# Patient Record
Sex: FEMALE | Race: WHITE | NOT HISPANIC OR LATINO | Employment: OTHER
[De-identification: names, ages, dates, MRNs, and addresses within clinical notes are randomized per-mention and may not be internally consistent; named-entity substitution may affect disease eponyms.]

---

## 2017-01-03 ENCOUNTER — CHARTING TRANS (OUTPATIENT)
Dept: OTHER | Age: 62
End: 2017-01-03

## 2017-01-09 ENCOUNTER — MYAURORA ACCOUNT LINK (OUTPATIENT)
Dept: OTHER | Age: 62
End: 2017-01-09

## 2017-01-09 ENCOUNTER — CHARTING TRANS (OUTPATIENT)
Dept: OTHER | Age: 62
End: 2017-01-09

## 2017-01-10 ENCOUNTER — CHARTING TRANS (OUTPATIENT)
Dept: OTHER | Age: 62
End: 2017-01-10

## 2017-01-11 ENCOUNTER — CHARTING TRANS (OUTPATIENT)
Dept: OTHER | Age: 62
End: 2017-01-11

## 2017-01-24 ENCOUNTER — CHARTING TRANS (OUTPATIENT)
Dept: OTHER | Age: 62
End: 2017-01-24

## 2017-02-01 ENCOUNTER — CHARTING TRANS (OUTPATIENT)
Dept: OTHER | Age: 62
End: 2017-02-01

## 2017-02-02 ENCOUNTER — CHARTING TRANS (OUTPATIENT)
Dept: URGENT CARE | Age: 62
End: 2017-02-02

## 2017-02-02 ENCOUNTER — MYAURORA ACCOUNT LINK (OUTPATIENT)
Dept: OTHER | Age: 62
End: 2017-02-02

## 2017-02-02 ENCOUNTER — LAB SERVICES (OUTPATIENT)
Dept: OTHER | Age: 62
End: 2017-02-02

## 2017-02-02 LAB
ALBUMIN SERPL BCG-MCNC: 4 G/DL (ref 3.6–5.1)
ALP SERPL-CCNC: 158 U/L (ref 45–105)
ALT SERPL W/O P-5'-P-CCNC: 67 U/L (ref 15–43)
AST SERPL-CCNC: 68 U/L (ref 14–43)
BASOPHIL %: 0.7 % (ref 0–1.2)
BASOPHIL ABSOLUTE #: 0.1 10*3/UL (ref 0–0.1)
BILIRUB SERPL-MCNC: 1.1 MG/DL (ref 0–1.3)
BUN SERPL-MCNC: 22 MG/DL (ref 7–20)
CALCIUM SERPL-MCNC: 9.6 MG/DL (ref 8.6–10.6)
CHLORIDE SERPL-SCNC: 113 MMOL/L (ref 96–107)
CREATININE, SERUM: 1.5 MG/DL (ref 0.5–1.4)
DIFFERENTIAL TYPE: ABNORMAL
EOSINOPHIL %: 1.5 % (ref 0–10)
EOSINOPHIL ABSOLUTE #: 0.1 10*3/UL (ref 0–0.5)
GFR SERPL CREATININE-BSD FRML MDRD: 35 ML/MIN/{1.73M2}
GFR SERPL CREATININE-BSD FRML MDRD: 42 ML/MIN/{1.73M2}
GLUCOSE SERPL-MCNC: 90 MG/DL (ref 70–200)
HCO3 SERPL-SCNC: 19 MMOL/L (ref 22–32)
HEMATOCRIT: 39.1 % (ref 34–45)
HEMOGLOBIN: 12.6 G/DL (ref 11.2–15.7)
LYMPH PERCENT: 14.5 % (ref 20.5–51.1)
LYMPHOCYTE ABSOLUTE #: 1.4 10*3/UL (ref 1.2–3.4)
MEAN CORPUSCULAR HGB CONCENTRATION: 32.2 % (ref 32–36)
MEAN CORPUSCULAR HGB: 32 PG (ref 27–34)
MEAN CORPUSCULAR VOLUME: 99.2 FL (ref 79–95)
MEAN PLATELET VOLUME: 9.7 FL (ref 8.6–12.4)
MONOCYTE ABSOLUTE #: 1.1 10*3/UL (ref 0.2–0.9)
MONOCYTE PERCENT: 11.2 % (ref 4.3–12.9)
NEUTROPHIL ABSOLUTE #: 6.8 10*3/UL (ref 1.4–6.5)
NEUTROPHIL PERCENT: 72.1 % (ref 34–73.5)
PLATELET COUNT: 348 10*3/UL (ref 150–400)
POTASSIUM SERPL-SCNC: 4.8 MMOL/L (ref 3.5–5.3)
PROT SERPL-MCNC: 7.9 G/DL (ref 6.4–8.5)
RED BLOOD CELL COUNT: 3.94 10*6/UL (ref 3.7–5.2)
RED CELL DISTRIBUTION WIDTH: 15.4 % (ref 11.3–14.8)
SODIUM SERPL-SCNC: 141 MMOL/L (ref 136–146)
WHITE BLOOD CELL COUNT: 9.4 10*3/UL (ref 4–10)

## 2017-02-02 ASSESSMENT — PAIN SCALES - GENERAL: PAINLEVEL_OUTOF10: 4

## 2017-02-10 ENCOUNTER — CHARTING TRANS (OUTPATIENT)
Dept: INTERNAL MEDICINE | Age: 62
End: 2017-02-10

## 2017-02-10 ENCOUNTER — MYAURORA ACCOUNT LINK (OUTPATIENT)
Dept: OTHER | Age: 62
End: 2017-02-10

## 2017-02-10 ENCOUNTER — LAB SERVICES (OUTPATIENT)
Dept: OTHER | Age: 62
End: 2017-02-10

## 2017-02-10 LAB
ALBUMIN SERPL BCG-MCNC: 4.1 G/DL (ref 3.6–5.1)
ALP SERPL-CCNC: 146 U/L (ref 45–105)
ALT SERPL W/O P-5'-P-CCNC: 31 U/L (ref 15–43)
AST SERPL-CCNC: 16 U/L (ref 14–43)
BILIRUB SERPL-MCNC: 0.4 MG/DL (ref 0–1.3)
BUN SERPL-MCNC: 17 MG/DL (ref 7–20)
CALCIUM SERPL-MCNC: 10 MG/DL (ref 8.6–10.6)
CHLORIDE SERPL-SCNC: 113 MMOL/L (ref 96–107)
CREATININE, SERUM: 1 MG/DL (ref 0.5–1.4)
GFR SERPL CREATININE-BSD FRML MDRD: 56 ML/MIN/{1.73M2}
GFR SERPL CREATININE-BSD FRML MDRD: >60 ML/MIN/{1.73M2}
GLUCOSE SERPL-MCNC: 82 MG/DL (ref 70–200)
HCO3 SERPL-SCNC: 22 MMOL/L (ref 22–32)
POTASSIUM SERPL-SCNC: 4.4 MMOL/L (ref 3.5–5.3)
PROT SERPL-MCNC: 6.9 G/DL (ref 6.4–8.5)
SODIUM SERPL-SCNC: 142 MMOL/L (ref 136–146)

## 2017-02-15 ENCOUNTER — CHARTING TRANS (OUTPATIENT)
Dept: OTHER | Age: 62
End: 2017-02-15

## 2017-02-17 ENCOUNTER — CHARTING TRANS (OUTPATIENT)
Dept: OTHER | Age: 62
End: 2017-02-17

## 2017-02-20 ENCOUNTER — CHARTING TRANS (OUTPATIENT)
Dept: OTHER | Age: 62
End: 2017-02-20

## 2017-09-03 ENCOUNTER — CHARTING TRANS (OUTPATIENT)
Dept: OTHER | Age: 62
End: 2017-09-03

## 2017-09-03 ENCOUNTER — LAB SERVICES (OUTPATIENT)
Dept: OTHER | Age: 62
End: 2017-09-03

## 2017-09-03 ENCOUNTER — EXTERNAL RECORD (OUTPATIENT)
Dept: HEALTH INFORMATION MANAGEMENT | Facility: OTHER | Age: 62
End: 2017-09-03

## 2017-09-03 LAB
BASOPHIL AUTOMATED: 0.7 %
BASOPHILS: 0.1 (ref 0–0.2)
EOSINOPHIL AUTOMATED: 2.4 %
EOSINOPHILS: 0.3 (ref 0–0.5)
HEMATOCRIT: 36.6 % (ref 34.7–45.1)
HEMOGLOBIN: 11.5 GM/DL (ref 12–15.3)
LYMPHOCYTE AUTOMATED: 14.9 %
LYMPHOCYTES: 1.7 (ref 0.6–3.4)
MEAN CORPUSCULAR HGB: 30.3 PG (ref 26–34)
MEAN CORPUSCULAR HGB: 31.4 G/DL (ref 32.5–35.8)
MEAN CORPUSCULAR VOL: 96.4 FL (ref 80–100)
MEAN PLATELET VOLUME: 8.1 FL (ref 6.8–10.2)
MONOCYTE AUTOMATED: 11.2 %
MONOCYTES: 1.3 (ref 0.3–1)
NEUTROPHIL ABSOLUTE: 8.3 (ref 1.7–7.7)
NEUTROPHIL AUTOMATED: 70.8 %
PLATELET COUNT: 326 K/MM3 (ref 150–450)
RED BLOOD CELL COUNT: 3.8 M/MM3 (ref 3.63–5.04)
RED CELL DISTRIBUTIO: 15.3 % (ref 11.9–15.9)
TROPONIN I (TROP): < 0.03 NG/ML
WHITE BLOOD CELL COU: 11.7 K/MM3 (ref 4–11)

## 2017-09-05 ENCOUNTER — CHARTING TRANS (OUTPATIENT)
Dept: OTHER | Age: 62
End: 2017-09-05

## 2017-09-08 ENCOUNTER — CHARTING TRANS (OUTPATIENT)
Dept: OTHER | Age: 62
End: 2017-09-08

## 2017-09-08 ENCOUNTER — IMAGING SERVICES (OUTPATIENT)
Dept: OTHER | Age: 62
End: 2017-09-08

## 2017-09-09 ENCOUNTER — CHARTING TRANS (OUTPATIENT)
Dept: OTHER | Age: 62
End: 2017-09-09

## 2017-09-12 ENCOUNTER — CHARTING TRANS (OUTPATIENT)
Dept: OTHER | Age: 62
End: 2017-09-12

## 2017-09-14 ENCOUNTER — CHARTING TRANS (OUTPATIENT)
Dept: OTHER | Age: 62
End: 2017-09-14

## 2017-09-18 ENCOUNTER — CHARTING TRANS (OUTPATIENT)
Dept: OTHER | Age: 62
End: 2017-09-18

## 2017-10-06 ENCOUNTER — IMAGING SERVICES (OUTPATIENT)
Dept: OTHER | Age: 62
End: 2017-10-06

## 2017-10-06 ENCOUNTER — CHARTING TRANS (OUTPATIENT)
Dept: CARDIOLOGY | Age: 62
End: 2017-10-06

## 2017-10-06 ENCOUNTER — CHARTING TRANS (OUTPATIENT)
Dept: OTHER | Age: 62
End: 2017-10-06

## 2017-10-09 ENCOUNTER — CHARTING TRANS (OUTPATIENT)
Dept: OTHER | Age: 62
End: 2017-10-09

## 2017-10-16 ENCOUNTER — CHARTING TRANS (OUTPATIENT)
Dept: OTHER | Age: 62
End: 2017-10-16

## 2017-10-19 ENCOUNTER — IMAGING SERVICES (OUTPATIENT)
Dept: OTHER | Age: 62
End: 2017-10-19

## 2017-10-20 ENCOUNTER — CHARTING TRANS (OUTPATIENT)
Dept: OTHER | Age: 62
End: 2017-10-20

## 2017-11-14 ENCOUNTER — LAB SERVICES (OUTPATIENT)
Dept: OTHER | Age: 62
End: 2017-11-14

## 2017-11-14 LAB
ALBUMIN SERPL BCG-MCNC: 4 G/DL (ref 3.6–5.1)
ALP SERPL-CCNC: 120 U/L (ref 45–105)
ALT SERPL W/O P-5'-P-CCNC: 46 U/L (ref 15–43)
AST SERPL-CCNC: 34 U/L (ref 14–43)
BILIRUB SERPL-MCNC: 0.2 MG/DL (ref 0–1.3)
BUN SERPL-MCNC: 25 MG/DL (ref 7–20)
CALCIUM SERPL-MCNC: 9.5 MG/DL (ref 8.6–10.6)
CHLORIDE SERPL-SCNC: 116 MMOL/L (ref 96–107)
CHOLEST SERPL-MCNC: 187 MG/DL (ref 140–200)
CREATININE, SERUM: 1.3 MG/DL (ref 0.5–1.4)
GFR SERPL CREATININE-BSD FRML MDRD: 41 ML/MIN/{1.73M2}
GFR SERPL CREATININE-BSD FRML MDRD: 50 ML/MIN/{1.73M2}
GLUCOSE P FAST SERPL-MCNC: 89 MG/DL (ref 60–100)
HCO3 SERPL-SCNC: 18 MMOL/L (ref 22–32)
HDLC SERPL-MCNC: 41 MG/DL
LDLC SERPL CALC-MCNC: 103 MG/DL (ref 30–100)
POTASSIUM SERPL-SCNC: 4.9 MMOL/L (ref 3.5–5.3)
PROT SERPL-MCNC: 6.7 G/DL (ref 6.4–8.5)
SODIUM SERPL-SCNC: 146 MMOL/L (ref 136–146)
TRIGL SERPL-MCNC: 214 MG/DL (ref 0–200)

## 2017-11-21 ENCOUNTER — CHARTING TRANS (OUTPATIENT)
Dept: OTHER | Age: 62
End: 2017-11-21

## 2017-11-27 ENCOUNTER — CHARTING TRANS (OUTPATIENT)
Dept: OTHER | Age: 62
End: 2017-11-27

## 2017-12-12 ENCOUNTER — MYAURORA ACCOUNT LINK (OUTPATIENT)
Dept: OTHER | Age: 62
End: 2017-12-12

## 2017-12-12 ENCOUNTER — CHARTING TRANS (OUTPATIENT)
Dept: CARDIOLOGY | Age: 62
End: 2017-12-12

## 2017-12-12 ENCOUNTER — IMAGING SERVICES (OUTPATIENT)
Dept: OTHER | Age: 62
End: 2017-12-12

## 2017-12-12 ENCOUNTER — CHARTING TRANS (OUTPATIENT)
Dept: OTHER | Age: 62
End: 2017-12-12

## 2017-12-28 ENCOUNTER — LAB SERVICES (OUTPATIENT)
Dept: OTHER | Age: 62
End: 2017-12-28

## 2017-12-28 LAB
BASOPHIL %: 0.4 % (ref 0–1.2)
BASOPHIL ABSOLUTE #: 0.1 10*3/UL (ref 0–0.1)
BUN SERPL-MCNC: 19 MG/DL (ref 7–20)
CALCIUM SERPL-MCNC: 9.7 MG/DL (ref 8.6–10.6)
CHLORIDE SERPL-SCNC: 113 MMOL/L (ref 96–107)
CREATININE, SERUM: 1.1 MG/DL (ref 0.5–1.4)
DIFFERENTIAL TYPE: ABNORMAL
EOSINOPHIL %: 3.6 % (ref 0–10)
EOSINOPHIL ABSOLUTE #: 0.4 10*3/UL (ref 0–0.5)
GFR SERPL CREATININE-BSD FRML MDRD: 50 ML/MIN/{1.73M2}
GFR SERPL CREATININE-BSD FRML MDRD: >60 ML/MIN/{1.73M2}
GLUCOSE SERPL-MCNC: 101 MG/DL (ref 70–200)
HCO3 SERPL-SCNC: 19 MMOL/L (ref 22–32)
HEMATOCRIT: 35.9 % (ref 34–45)
HEMOGLOBIN: 11 G/DL (ref 11.2–15.7)
INTERNATIONAL NORMALIZED RATIO: 1
LYMPH PERCENT: 27.7 % (ref 20.5–51.1)
LYMPHOCYTE ABSOLUTE #: 3.1 10*3/UL (ref 1.2–3.4)
MAGNESIUM SERPL-MCNC: 1.8 MG/DL (ref 1.6–2.6)
MEAN CORPUSCULAR HGB CONCENTRATION: 30.6 % (ref 32–36)
MEAN CORPUSCULAR HGB: 31 PG (ref 27–34)
MEAN CORPUSCULAR VOLUME: 101.1 FL (ref 79–95)
MEAN PLATELET VOLUME: 9.8 FL (ref 8.6–12.4)
MONOCYTE ABSOLUTE #: 0.9 10*3/UL (ref 0.2–0.9)
MONOCYTE PERCENT: 7.6 % (ref 4.3–12.9)
NEUTROPHIL ABSOLUTE #: 6.8 10*3/UL (ref 1.4–6.5)
NEUTROPHIL PERCENT: 60.7 % (ref 34–73.5)
PLATELET COUNT: 544 10*3/UL (ref 150–400)
POTASSIUM SERPL-SCNC: 4.6 MMOL/L (ref 3.5–5.3)
PROTHROMBIN TIME: 9.9 S (ref 9.5–11.5)
RED BLOOD CELL COUNT: 3.55 10*6/UL (ref 3.7–5.2)
RED CELL DISTRIBUTION WIDTH: 14.5 % (ref 11.3–14.8)
SODIUM SERPL-SCNC: 144 MMOL/L (ref 136–146)
WHITE BLOOD CELL COUNT: 11.2 10*3/UL (ref 4–10)

## 2018-02-07 ENCOUNTER — CHARTING TRANS (OUTPATIENT)
Dept: OTHER | Age: 63
End: 2018-02-07

## 2018-02-08 ENCOUNTER — MYAURORA ACCOUNT LINK (OUTPATIENT)
Dept: OTHER | Age: 63
End: 2018-02-08

## 2018-02-08 ENCOUNTER — CHARTING TRANS (OUTPATIENT)
Dept: OTHER | Age: 63
End: 2018-02-08

## 2018-02-21 ENCOUNTER — IMAGING SERVICES (OUTPATIENT)
Dept: OTHER | Age: 63
End: 2018-02-21

## 2018-03-07 ENCOUNTER — LAB SERVICES (OUTPATIENT)
Dept: OTHER | Age: 63
End: 2018-03-07

## 2018-03-07 ENCOUNTER — CHARTING TRANS (OUTPATIENT)
Dept: OTHER | Age: 63
End: 2018-03-07

## 2018-03-07 LAB
ALBUMIN SERPL BCG-MCNC: 4.3 G/DL (ref 3.6–5.1)
ALP SERPL-CCNC: 131 U/L (ref 45–105)
ALT SERPL W/O P-5'-P-CCNC: 35 U/L (ref 15–43)
AST SERPL-CCNC: 19 U/L (ref 14–43)
BILIRUB SERPL-MCNC: 0.3 MG/DL (ref 0–1.3)
BILIRUBIN URINE: NEGATIVE
BLOOD URINE: ABNORMAL
BUN SERPL-MCNC: 24 MG/DL (ref 7–20)
CALCIUM SERPL-MCNC: 9.8 MG/DL (ref 8.6–10.6)
CHLORIDE SERPL-SCNC: 111 MMOL/L (ref 96–107)
CLARITY: ABNORMAL
COLOR: YELLOW
CREATININE, SERUM: 1.4 MG/DL (ref 0.5–1.4)
DIFFERENTIAL TYPE: ABNORMAL
GFR SERPL CREATININE-BSD FRML MDRD: 38 ML/MIN/{1.73M2}
GFR SERPL CREATININE-BSD FRML MDRD: 46 ML/MIN/{1.73M2}
GLUCOSE QUALITATIVE U: NEGATIVE
GLUCOSE SERPL-MCNC: 120 MG/DL (ref 70–200)
HCO3 SERPL-SCNC: 19 MMOL/L (ref 22–32)
HEMATOCRIT: 35.5 % (ref 34–45)
HEMOGLOBIN: 11.4 G/DL (ref 11.2–15.7)
INFLUENZA TYPE A ANTIGEN: NEGATIVE
INFLUENZA TYPE B ANTIGEN: NEGATIVE
KETONES, URINE: 20
LEUKOCYTE ESTERASE URINE: ABNORMAL
MEAN CORPUSCULAR HGB CONCENTRATION: 32.1 % (ref 32–36)
MEAN CORPUSCULAR HGB: 32 PG (ref 27–34)
MEAN CORPUSCULAR VOLUME: 99.7 FL (ref 79–95)
MEAN PLATELET VOLUME: 9.5 FL (ref 8.6–12.4)
NITRITE URINE: POSITIVE
PH URINE: 5 (ref 5–7)
PLATELET COUNT: 357 10*3/UL (ref 150–400)
POTASSIUM SERPL-SCNC: 4.5 MMOL/L (ref 3.5–5.3)
PROT SERPL-MCNC: 7.5 G/DL (ref 6.4–8.5)
RED BLOOD CELL COUNT: 3.56 10*6/UL (ref 3.7–5.2)
RED CELL DISTRIBUTION WIDTH: 14.9 % (ref 11.3–14.8)
SODIUM SERPL-SCNC: 143 MMOL/L (ref 136–146)
SPECIFIC GRAVITY URINE: 1.02 (ref 1–1.03)
URINE PROTEIN, QUAL (DIPSTICK): 100
UROBILINOGEN URINE: <2
WHITE BLOOD CELL COUNT: 22.2 10*3/UL (ref 4–10)

## 2018-03-07 ASSESSMENT — PAIN SCALES - GENERAL: PAINLEVEL_OUTOF10: 9

## 2018-03-08 ENCOUNTER — CHARTING TRANS (OUTPATIENT)
Dept: OTHER | Age: 63
End: 2018-03-08

## 2018-03-09 ENCOUNTER — LAB SERVICES (OUTPATIENT)
Dept: OTHER | Age: 63
End: 2018-03-09

## 2018-03-09 ENCOUNTER — CHARTING TRANS (OUTPATIENT)
Dept: OTHER | Age: 63
End: 2018-03-09

## 2018-03-09 ENCOUNTER — MYAURORA ACCOUNT LINK (OUTPATIENT)
Dept: OTHER | Age: 63
End: 2018-03-09

## 2018-03-09 LAB
ALBUMIN SERPL BCG-MCNC: 3.8 G/DL (ref 3.6–5.1)
ALP SERPL-CCNC: 120 U/L (ref 45–105)
ALT SERPL W/O P-5'-P-CCNC: 14 U/L (ref 7–34)
AST SERPL-CCNC: 13 U/L (ref 9–37)
BILIRUB SERPL-MCNC: 0.3 MG/DL (ref 0–1)
BILIRUBIN URINE: ABNORMAL
BLOOD URINE: ABNORMAL
BUN SERPL-MCNC: 29 MG/DL (ref 7–20)
CALCIUM SERPL-MCNC: 9.6 MG/DL (ref 8.6–10.6)
CHLORIDE SERPL-SCNC: 108 MMOL/L (ref 96–107)
CLARITY: ABNORMAL
COLOR: YELLOW
CREATININE, SERUM: 1.5 MG/DL (ref 0.5–1.4)
DIFFERENTIAL TYPE: ABNORMAL
GFR SERPL CREATININE-BSD FRML MDRD: 35 ML/MIN/{1.73M2}
GFR SERPL CREATININE-BSD FRML MDRD: 42 ML/MIN/{1.73M2}
GLUCOSE QUALITATIVE U: NEGATIVE
GLUCOSE SERPL-MCNC: 94 MG/DL (ref 70–200)
HCO3 SERPL-SCNC: 21 MMOL/L (ref 22–32)
HEMATOCRIT: 36.4 % (ref 34–45)
HEMOGLOBIN: 11.9 G/DL (ref 11.2–15.7)
KETONES, URINE: NEGATIVE
LEUKOCYTE ESTERASE URINE: ABNORMAL
LYMPH PERCENT: 14.8 % (ref 20.5–51.1)
LYMPHOCYTE ABSOLUTE #: 1.6 10*3/UL (ref 1.2–3.4)
MEAN CORPUSCULAR HGB CONCENTRATION: 32.7 % (ref 32–36)
MEAN CORPUSCULAR HGB: 32 PG (ref 27–34)
MEAN CORPUSCULAR VOLUME: 97.8 FL (ref 79–95)
MEAN PLATELET VOLUME: 8.8 FL (ref 8.6–12.4)
MIXED %: 14.2 % (ref 4.3–12.9)
MIXED ABSOLUTE #: 1.6 10*3/UL (ref 0.2–0.9)
NEUTROPHIL ABSOLUTE #: 7.9 10*3/UL (ref 1.4–6.5)
NEUTROPHIL PERCENT: 71 % (ref 34–73.5)
NITRITE URINE: NEGATIVE
PH URINE: 5 (ref 5–7)
PLATELET COUNT: 423 10*3/UL (ref 150–400)
POTASSIUM SERPL-SCNC: 3.9 MMOL/L (ref 3.5–5.3)
PROT SERPL-MCNC: 7.3 G/DL (ref 6.2–8.1)
RED BLOOD CELL COUNT: 3.72 10*6/UL (ref 3.7–5.2)
RED CELL DISTRIBUTION WIDTH: 14.4 % (ref 11.3–14.8)
SODIUM SERPL-SCNC: 138 MMOL/L (ref 136–146)
SPECIFIC GRAVITY URINE: 1.02 (ref 1–1.03)
URINE PROTEIN, QUAL (DIPSTICK): ABNORMAL
UROBILINOGEN URINE: 0.2
WHITE BLOOD CELL COUNT: 11.1 10*3/UL (ref 4–10)

## 2018-03-09 ASSESSMENT — PAIN SCALES - GENERAL: PAINLEVEL_OUTOF10: 9

## 2018-03-12 ENCOUNTER — CHARTING TRANS (OUTPATIENT)
Dept: OTHER | Age: 63
End: 2018-03-12

## 2018-04-16 ENCOUNTER — CHARTING TRANS (OUTPATIENT)
Dept: OTHER | Age: 63
End: 2018-04-16

## 2018-04-19 ENCOUNTER — CHARTING TRANS (OUTPATIENT)
Dept: OTHER | Age: 63
End: 2018-04-19

## 2018-05-01 ENCOUNTER — CHARTING TRANS (OUTPATIENT)
Dept: OTHER | Age: 63
End: 2018-05-01

## 2018-05-21 ENCOUNTER — CHARTING TRANS (OUTPATIENT)
Dept: OTHER | Age: 63
End: 2018-05-21

## 2018-06-01 ENCOUNTER — LAB SERVICES (OUTPATIENT)
Dept: OTHER | Age: 63
End: 2018-06-01

## 2018-06-01 LAB
ALBUMIN SERPL-MCNC: 4 G/DL (ref 3.6–5.1)
ALP SERPL-CCNC: 123 U/L (ref 45–105)
ALT SERPL-CCNC: 49 U/L (ref 15–43)
AST SERPL-CCNC: 34 U/L (ref 14–43)
BILIRUB CONJ SERPL-MCNC: 0 MG/DL (ref 0–0.3)
BILIRUB SERPL-MCNC: <0.1 MG/DL (ref 0–1.3)
CHOLEST SERPL-MCNC: 166 MG/DL (ref 140–200)
HDLC SERPL-MCNC: 40 MG/DL
LDLC SERPL CALC-MCNC: 89 MG/DL (ref 30–100)
PROT SERPL-MCNC: 6.4 G/DL (ref 6.4–8.5)
TRIGL SERPL-MCNC: 187 MG/DL (ref 0–200)

## 2018-06-05 ENCOUNTER — MYAURORA ACCOUNT LINK (OUTPATIENT)
Dept: OTHER | Age: 63
End: 2018-06-05

## 2018-06-05 ENCOUNTER — CHARTING TRANS (OUTPATIENT)
Dept: OTHER | Age: 63
End: 2018-06-05

## 2018-06-26 ENCOUNTER — CHARTING TRANS (OUTPATIENT)
Dept: OTHER | Age: 63
End: 2018-06-26

## 2018-07-02 ENCOUNTER — CHARTING TRANS (OUTPATIENT)
Dept: OTHER | Age: 63
End: 2018-07-02

## 2018-08-03 ENCOUNTER — CHARTING TRANS (OUTPATIENT)
Dept: OTHER | Age: 63
End: 2018-08-03

## 2018-09-12 ENCOUNTER — CHARTING TRANS (OUTPATIENT)
Dept: OTHER | Age: 63
End: 2018-09-12

## 2018-09-18 ENCOUNTER — CHARTING TRANS (OUTPATIENT)
Dept: OTHER | Age: 63
End: 2018-09-18

## 2018-09-21 ENCOUNTER — CHARTING TRANS (OUTPATIENT)
Dept: OTHER | Age: 63
End: 2018-09-21

## 2018-09-25 ENCOUNTER — MYAURORA ACCOUNT LINK (OUTPATIENT)
Dept: OTHER | Age: 63
End: 2018-09-25

## 2018-09-25 ENCOUNTER — ANCILLARY ORDERS (OUTPATIENT)
Dept: OTHER | Age: 63
End: 2018-09-25

## 2018-09-25 ENCOUNTER — CHARTING TRANS (OUTPATIENT)
Dept: OTHER | Age: 63
End: 2018-09-25

## 2018-09-25 DIAGNOSIS — Z12.31 ENCOUNTER FOR SCREENING MAMMOGRAM FOR MALIGNANT NEOPLASM OF BREAST: ICD-10-CM

## 2018-09-27 ENCOUNTER — CHARTING TRANS (OUTPATIENT)
Dept: OTHER | Age: 63
End: 2018-09-27

## 2018-10-12 ENCOUNTER — CHARTING TRANS (OUTPATIENT)
Dept: OTHER | Age: 63
End: 2018-10-12

## 2018-10-15 ENCOUNTER — ANCILLARY ORDERS (OUTPATIENT)
Dept: OTHER | Age: 63
End: 2018-10-15

## 2018-10-15 ENCOUNTER — CHARTING TRANS (OUTPATIENT)
Dept: OTHER | Age: 63
End: 2018-10-15

## 2018-10-15 ENCOUNTER — MYAURORA ACCOUNT LINK (OUTPATIENT)
Dept: OTHER | Age: 63
End: 2018-10-15

## 2018-10-15 DIAGNOSIS — Q21.10 ATRIAL SEPTAL DEFECT: ICD-10-CM

## 2018-10-15 DIAGNOSIS — I71.40 ABDOMINAL AORTIC ANEURYSM WITHOUT RUPTURE (CMD): ICD-10-CM

## 2018-10-15 DIAGNOSIS — N25.89 OTHER DISORDERS RESULTING FROM IMPAIRED RENAL TUBULAR FUNCTION: ICD-10-CM

## 2018-10-15 DIAGNOSIS — N18.30 CHRONIC KIDNEY DISEASE, STAGE III (MODERATE) (CMD): ICD-10-CM

## 2018-10-17 ENCOUNTER — CHARTING TRANS (OUTPATIENT)
Dept: OTHER | Age: 63
End: 2018-10-17

## 2018-10-23 PROBLEM — M16.32 OSTEOARTHRITIS RESULTING FROM LEFT HIP DYSPLASIA: Status: ACTIVE | Noted: 2018-10-23

## 2018-10-24 ENCOUNTER — CHARTING TRANS (OUTPATIENT)
Dept: OTHER | Age: 63
End: 2018-10-24

## 2018-10-31 PROBLEM — M25.552 LEFT HIP PAIN: Status: ACTIVE | Noted: 2018-10-31

## 2018-11-23 ENCOUNTER — IMAGING SERVICES (OUTPATIENT)
Dept: OTHER | Age: 63
End: 2018-11-23

## 2018-11-23 ENCOUNTER — CHARTING TRANS (OUTPATIENT)
Dept: OTHER | Age: 63
End: 2018-11-23

## 2018-11-27 VITALS
HEIGHT: 66 IN | SYSTOLIC BLOOD PRESSURE: 118 MMHG | BODY MASS INDEX: 23.78 KG/M2 | HEART RATE: 79 BPM | WEIGHT: 148 LBS | DIASTOLIC BLOOD PRESSURE: 80 MMHG | OXYGEN SATURATION: 98 %

## 2018-11-28 VITALS — HEART RATE: 72 BPM | WEIGHT: 142 LBS | DIASTOLIC BLOOD PRESSURE: 66 MMHG | SYSTOLIC BLOOD PRESSURE: 110 MMHG

## 2018-11-28 VITALS
RESPIRATION RATE: 14 BRPM | HEIGHT: 66 IN | WEIGHT: 145 LBS | SYSTOLIC BLOOD PRESSURE: 118 MMHG | OXYGEN SATURATION: 100 % | BODY MASS INDEX: 23.3 KG/M2 | HEART RATE: 73 BPM | DIASTOLIC BLOOD PRESSURE: 86 MMHG

## 2018-11-28 VITALS
BODY MASS INDEX: 23.63 KG/M2 | WEIGHT: 147 LBS | SYSTOLIC BLOOD PRESSURE: 132 MMHG | HEART RATE: 82 BPM | HEIGHT: 66 IN | OXYGEN SATURATION: 97 % | DIASTOLIC BLOOD PRESSURE: 80 MMHG

## 2018-11-29 VITALS
SYSTOLIC BLOOD PRESSURE: 128 MMHG | TEMPERATURE: 99.2 F | HEART RATE: 86 BPM | RESPIRATION RATE: 16 BRPM | DIASTOLIC BLOOD PRESSURE: 77 MMHG | OXYGEN SATURATION: 99 %

## 2018-11-29 VITALS
OXYGEN SATURATION: 98 % | HEART RATE: 80 BPM | SYSTOLIC BLOOD PRESSURE: 108 MMHG | WEIGHT: 149 LBS | DIASTOLIC BLOOD PRESSURE: 60 MMHG

## 2018-12-28 ENCOUNTER — OFFICE VISIT (OUTPATIENT)
Dept: INTERNAL MEDICINE | Age: 63
End: 2018-12-28

## 2018-12-28 ENCOUNTER — LAB SERVICES (OUTPATIENT)
Dept: LAB | Age: 63
End: 2018-12-28

## 2018-12-28 VITALS
DIASTOLIC BLOOD PRESSURE: 90 MMHG | BODY MASS INDEX: 25.66 KG/M2 | OXYGEN SATURATION: 98 % | HEART RATE: 88 BPM | HEIGHT: 65 IN | SYSTOLIC BLOOD PRESSURE: 134 MMHG | WEIGHT: 154 LBS

## 2018-12-28 DIAGNOSIS — J06.9 UPPER RESPIRATORY TRACT INFECTION, UNSPECIFIED TYPE: ICD-10-CM

## 2018-12-28 DIAGNOSIS — I10 ESSENTIAL HYPERTENSION: ICD-10-CM

## 2018-12-28 DIAGNOSIS — I26.99 PE (PULMONARY THROMBOEMBOLISM) (CMD): ICD-10-CM

## 2018-12-28 DIAGNOSIS — R30.0 DYSURIA: Primary | ICD-10-CM

## 2018-12-28 DIAGNOSIS — R30.0 DYSURIA: ICD-10-CM

## 2018-12-28 PROBLEM — N18.30 CKD (CHRONIC KIDNEY DISEASE) STAGE 3, GFR 30-59 ML/MIN (CMD): Status: ACTIVE | Noted: 2018-12-28

## 2018-12-28 PROBLEM — Z72.0 TOBACCO USE: Status: ACTIVE | Noted: 2017-09-15

## 2018-12-28 PROBLEM — G45.9 TIA (TRANSIENT ISCHEMIC ATTACK): Status: ACTIVE | Noted: 2017-10-06

## 2018-12-28 PROBLEM — M16.32 OSTEOARTHRITIS RESULTING FROM LEFT HIP DYSPLASIA: Status: ACTIVE | Noted: 2018-10-23

## 2018-12-28 PROBLEM — E78.5 HLD (HYPERLIPIDEMIA): Status: ACTIVE | Noted: 2018-12-28

## 2018-12-28 LAB
BACTERIA: ABNORMAL
BILIRUBIN URINE: NEGATIVE
BLOOD URINE: ABNORMAL
CLARITY: ABNORMAL
COLOR: YELLOW
GLUCOSE QUALITATIVE U: NEGATIVE
KETONES, URINE: NEGATIVE
LEUKOCYTE ESTERASE URINE: ABNORMAL
MUCOUS: ABNORMAL
NITRITE URINE: NEGATIVE
PH URINE: 5 (ref 5–7)
RED BLOOD CELLS URINE: ABNORMAL (ref 0–3)
SPECIFIC GRAVITY URINE: 1.03 (ref 1–1.03)
SQUAMOUS EPITHELIAL CELLS: ABNORMAL
URINE PROTEIN, QUAL (DIPSTICK): NEGATIVE
UROBILINOGEN URINE: <2
WBC CLUMPS: ABNORMAL
WHITE BLOOD CELLS URINE: >100 (ref 0–5)

## 2018-12-28 PROCEDURE — 81001 URINALYSIS AUTO W/SCOPE: CPT | Performed by: FAMILY MEDICINE

## 2018-12-28 PROCEDURE — 87186 SC STD MICRODIL/AGAR DIL: CPT | Performed by: FAMILY MEDICINE

## 2018-12-28 PROCEDURE — 99214 OFFICE O/P EST MOD 30 MIN: CPT | Performed by: FAMILY MEDICINE

## 2018-12-28 PROCEDURE — 87088 URINE BACTERIA CULTURE: CPT | Performed by: FAMILY MEDICINE

## 2018-12-28 PROCEDURE — 87086 URINE CULTURE/COLONY COUNT: CPT | Performed by: FAMILY MEDICINE

## 2018-12-28 RX ORDER — CEFUROXIME AXETIL 500 MG/1
500 TABLET ORAL 2 TIMES DAILY
Qty: 20 TABLET | Refills: 0 | Status: SHIPPED | OUTPATIENT
Start: 2018-12-28 | End: 2019-02-19 | Stop reason: ALTCHOICE

## 2018-12-28 RX ORDER — TOPIRAMATE 100 MG/1
1 TABLET, FILM COATED ORAL 2 TIMES DAILY
COMMUNITY
Start: 2004-11-08

## 2018-12-28 RX ORDER — YOHIMBE BARK 500 MG
CAPSULE ORAL
COMMUNITY
Start: 2014-08-15

## 2018-12-28 RX ORDER — BUTALBITAL, ACETAMINOPHEN AND CAFFEINE 50; 325; 40 MG/1; MG/1; MG/1
1 TABLET ORAL EVERY 4 HOURS PRN
COMMUNITY
Start: 2014-07-15 | End: 2023-06-13 | Stop reason: SDUPTHER

## 2018-12-28 RX ORDER — ASPIRIN 81 MG/1
81 TABLET, CHEWABLE ORAL DAILY
COMMUNITY

## 2018-12-28 RX ORDER — CETIRIZINE HYDROCHLORIDE 10 MG/1
10 TABLET ORAL DAILY
COMMUNITY

## 2018-12-28 RX ORDER — VENLAFAXINE HYDROCHLORIDE 75 MG/1
1 CAPSULE, EXTENDED RELEASE ORAL EVERY MORNING
COMMUNITY
Start: 2016-12-30

## 2018-12-28 RX ORDER — ATORVASTATIN CALCIUM 40 MG/1
1 TABLET, FILM COATED ORAL DAILY
COMMUNITY
Start: 2018-11-12 | End: 2019-01-03 | Stop reason: SDUPTHER

## 2018-12-31 ENCOUNTER — ANCILLARY PROCEDURE (OUTPATIENT)
Dept: CARDIOLOGY | Age: 63
End: 2018-12-31
Attending: INTERNAL MEDICINE

## 2018-12-31 DIAGNOSIS — G45.9 TIA (TRANSIENT ISCHEMIC ATTACK): ICD-10-CM

## 2018-12-31 LAB — FINAL REPORT: ABNORMAL

## 2019-01-01 ENCOUNTER — EXTERNAL RECORD (OUTPATIENT)
Dept: HEALTH INFORMATION MANAGEMENT | Facility: OTHER | Age: 64
End: 2019-01-01

## 2019-01-02 ENCOUNTER — TELEPHONE (OUTPATIENT)
Dept: INTERNAL MEDICINE | Age: 64
End: 2019-01-02

## 2019-01-02 DIAGNOSIS — R30.0 DYSURIA: Primary | ICD-10-CM

## 2019-01-02 DIAGNOSIS — R31.9 HEMATURIA, UNSPECIFIED TYPE: ICD-10-CM

## 2019-01-02 RX ORDER — CIPROFLOXACIN 250 MG/1
250 TABLET, FILM COATED ORAL 2 TIMES DAILY
Qty: 14 TABLET | Refills: 0 | Status: SHIPPED | OUTPATIENT
Start: 2019-01-02 | End: 2019-01-09

## 2019-01-03 ENCOUNTER — TELEPHONE (OUTPATIENT)
Dept: CARDIOLOGY | Age: 64
End: 2019-01-03

## 2019-01-03 RX ORDER — ATORVASTATIN CALCIUM 40 MG/1
40 TABLET, FILM COATED ORAL DAILY
Qty: 90 TABLET | Refills: 3 | Status: SHIPPED | OUTPATIENT
Start: 2019-01-03 | End: 2019-12-12 | Stop reason: SDUPTHER

## 2019-01-10 ENCOUNTER — LAB SERVICES (OUTPATIENT)
Dept: LAB | Age: 64
End: 2019-01-10

## 2019-01-10 DIAGNOSIS — R30.0 DYSURIA: ICD-10-CM

## 2019-01-10 LAB
BILIRUBIN URINE: NEGATIVE
BLOOD URINE: ABNORMAL
CLARITY: CLEAR
COLOR: YELLOW
GLUCOSE QUALITATIVE U: NEGATIVE
KETONES, URINE: NEGATIVE
LEUKOCYTE ESTERASE URINE: ABNORMAL
MUCOUS: ABNORMAL
NITRITE URINE: NEGATIVE
PH URINE: 5 (ref 5–7)
RED BLOOD CELLS URINE: ABNORMAL (ref 0–3)
SPECIFIC GRAVITY URINE: 1.02 (ref 1–1.03)
SQUAMOUS EPITHELIAL CELLS: ABNORMAL
URINE PROTEIN, QUAL (DIPSTICK): NEGATIVE
UROBILINOGEN URINE: <2
WHITE BLOOD CELLS URINE: ABNORMAL (ref 0–5)

## 2019-01-10 PROCEDURE — 81001 URINALYSIS AUTO W/SCOPE: CPT | Performed by: FAMILY MEDICINE

## 2019-01-10 PROCEDURE — 87086 URINE CULTURE/COLONY COUNT: CPT | Performed by: FAMILY MEDICINE

## 2019-01-11 ENCOUNTER — TELEPHONE (OUTPATIENT)
Dept: INTERNAL MEDICINE | Age: 64
End: 2019-01-11

## 2019-01-11 LAB — FINAL REPORT: NORMAL

## 2019-01-16 ENCOUNTER — TELEPHONE (OUTPATIENT)
Dept: INTERNAL MEDICINE | Age: 64
End: 2019-01-16

## 2019-01-16 DIAGNOSIS — R31.9 HEMATURIA, UNSPECIFIED TYPE: Primary | ICD-10-CM

## 2019-01-17 ENCOUNTER — IMAGING SERVICES (OUTPATIENT)
Dept: CT IMAGING | Age: 64
End: 2019-01-17

## 2019-01-21 ENCOUNTER — ANCILLARY PROCEDURE (OUTPATIENT)
Dept: CARDIOLOGY | Age: 64
End: 2019-01-21
Attending: INTERNAL MEDICINE

## 2019-01-21 DIAGNOSIS — G45.9 TIA (TRANSIENT ISCHEMIC ATTACK): ICD-10-CM

## 2019-01-21 DIAGNOSIS — G45.9 TIA (TRANSIENT ISCHEMIC ATTACK): Primary | ICD-10-CM

## 2019-01-21 PROCEDURE — 93291 INTERROG DEV EVAL SCRMS IP: CPT | Performed by: INTERNAL MEDICINE

## 2019-01-23 ENCOUNTER — IMAGING SERVICES (OUTPATIENT)
Dept: CT IMAGING | Age: 64
End: 2019-01-23

## 2019-01-23 DIAGNOSIS — R31.9 HEMATURIA, UNSPECIFIED TYPE: ICD-10-CM

## 2019-01-23 PROCEDURE — 74176 CT ABD & PELVIS W/O CONTRAST: CPT | Performed by: RADIOLOGY

## 2019-01-24 ENCOUNTER — IMAGING SERVICES (OUTPATIENT)
Dept: OTHER | Age: 64
End: 2019-01-24

## 2019-01-24 ENCOUNTER — TELEPHONE (OUTPATIENT)
Dept: CARDIOLOGY | Age: 64
End: 2019-01-24

## 2019-01-24 ENCOUNTER — TELEPHONE (OUTPATIENT)
Dept: INTERNAL MEDICINE | Age: 64
End: 2019-01-24

## 2019-01-24 DIAGNOSIS — I71.40 ABDOMINAL AORTIC ANEURYSM (AAA) WITHOUT RUPTURE (CMD): Primary | ICD-10-CM

## 2019-02-08 ENCOUNTER — ANCILLARY PROCEDURE (OUTPATIENT)
Dept: CARDIOLOGY | Age: 64
End: 2019-02-08
Attending: INTERNAL MEDICINE

## 2019-02-08 DIAGNOSIS — G45.9 TIA (TRANSIENT ISCHEMIC ATTACK): ICD-10-CM

## 2019-02-19 ENCOUNTER — OFFICE VISIT (OUTPATIENT)
Dept: CARDIOLOGY | Age: 64
End: 2019-02-19

## 2019-02-19 ENCOUNTER — TELEPHONE (OUTPATIENT)
Dept: CARDIOLOGY | Age: 64
End: 2019-02-19

## 2019-02-19 VITALS
HEIGHT: 65 IN | HEART RATE: 90 BPM | OXYGEN SATURATION: 99 % | SYSTOLIC BLOOD PRESSURE: 124 MMHG | RESPIRATION RATE: 18 BRPM | BODY MASS INDEX: 26.06 KG/M2 | WEIGHT: 156.4 LBS | DIASTOLIC BLOOD PRESSURE: 80 MMHG

## 2019-02-19 DIAGNOSIS — R06.09 DYSPNEA ON EXERTION: ICD-10-CM

## 2019-02-19 DIAGNOSIS — Z01.810 PREOP CARDIOVASCULAR EXAM: ICD-10-CM

## 2019-02-19 DIAGNOSIS — I25.119 CORONARY ARTERY DISEASE INVOLVING NATIVE CORONARY ARTERY OF NATIVE HEART WITH ANGINA PECTORIS (CMD): ICD-10-CM

## 2019-02-19 DIAGNOSIS — I10 ESSENTIAL HYPERTENSION: Primary | ICD-10-CM

## 2019-02-19 DIAGNOSIS — I71.40 ABDOMINAL AORTIC ANEURYSM (AAA) WITHOUT RUPTURE (CMD): ICD-10-CM

## 2019-02-19 DIAGNOSIS — Z01.810 PREOP CARDIOVASCULAR EXAM: Primary | ICD-10-CM

## 2019-02-19 PROBLEM — Q21.10 ASD (ATRIAL SEPTAL DEFECT): Status: ACTIVE | Noted: 2017-09-01

## 2019-02-19 PROCEDURE — 3074F SYST BP LT 130 MM HG: CPT | Performed by: INTERNAL MEDICINE

## 2019-02-19 PROCEDURE — 93000 ELECTROCARDIOGRAM COMPLETE: CPT | Performed by: INTERNAL MEDICINE

## 2019-02-19 PROCEDURE — 99214 OFFICE O/P EST MOD 30 MIN: CPT | Performed by: INTERNAL MEDICINE

## 2019-02-19 PROCEDURE — 3079F DIAST BP 80-89 MM HG: CPT | Performed by: INTERNAL MEDICINE

## 2019-02-19 ASSESSMENT — ENCOUNTER SYMPTOMS
CONSTITUTIONAL NEGATIVE: 1
ENDOCRINE NEGATIVE: 1
EYES NEGATIVE: 1
PSYCHIATRIC NEGATIVE: 1
SHORTNESS OF BREATH: 1
GASTROINTESTINAL NEGATIVE: 1
ALLERGIC/IMMUNOLOGIC NEGATIVE: 1
HEMATOLOGIC/LYMPHATIC NEGATIVE: 1

## 2019-02-21 ENCOUNTER — TELEPHONE (OUTPATIENT)
Dept: CARDIOLOGY | Age: 64
End: 2019-02-21

## 2019-02-22 ENCOUNTER — ANCILLARY PROCEDURE (OUTPATIENT)
Dept: CARDIOLOGY | Age: 64
End: 2019-02-22
Attending: INTERNAL MEDICINE

## 2019-02-22 DIAGNOSIS — G45.9 TIA (TRANSIENT ISCHEMIC ATTACK): ICD-10-CM

## 2019-02-22 PROCEDURE — 93299 CARDIAC DEVICE CHECK - REMOTE SCHEDULED: CPT | Performed by: INTERNAL MEDICINE

## 2019-02-22 PROCEDURE — 93298 REM INTERROG DEV EVAL SCRMS: CPT | Performed by: INTERNAL MEDICINE

## 2019-02-25 ENCOUNTER — LAB SERVICES (OUTPATIENT)
Dept: LAB | Age: 64
End: 2019-02-25

## 2019-02-25 ENCOUNTER — IMAGING SERVICES (OUTPATIENT)
Dept: GENERAL RADIOLOGY | Age: 64
End: 2019-02-25
Attending: INTERNAL MEDICINE

## 2019-02-25 ENCOUNTER — ANCILLARY PROCEDURE (OUTPATIENT)
Dept: ULTRASOUND IMAGING | Age: 64
End: 2019-02-25
Attending: INTERNAL MEDICINE

## 2019-02-25 DIAGNOSIS — I10 ESSENTIAL HYPERTENSION: ICD-10-CM

## 2019-02-25 DIAGNOSIS — Z01.810 PREOP CARDIOVASCULAR EXAM: ICD-10-CM

## 2019-02-25 DIAGNOSIS — R06.09 DYSPNEA ON EXERTION: ICD-10-CM

## 2019-02-25 DIAGNOSIS — I71.40 ABDOMINAL AORTIC ANEURYSM (AAA) WITHOUT RUPTURE (CMD): ICD-10-CM

## 2019-02-25 DIAGNOSIS — I25.119 CORONARY ARTERY DISEASE INVOLVING NATIVE CORONARY ARTERY OF NATIVE HEART WITH ANGINA PECTORIS (CMD): ICD-10-CM

## 2019-02-25 DIAGNOSIS — G45.9 TRANSIENT CEREBRAL ISCHEMIC ATTACK: ICD-10-CM

## 2019-02-25 LAB
BASOPHIL %: 0.6 % (ref 0–1.2)
BASOPHIL ABSOLUTE #: 0.1 10*3/UL (ref 0–0.1)
DIFFERENTIAL TYPE: ABNORMAL
EOSINOPHIL %: 2.5 % (ref 0–10)
EOSINOPHIL ABSOLUTE #: 0.3 10*3/UL (ref 0–0.5)
HEMATOCRIT: 40 % (ref 34–45)
HEMOGLOBIN: 12.4 G/DL (ref 11.2–15.7)
INTERNATIONAL NORMALIZED RATIO: 1
LYMPH PERCENT: 31.8 % (ref 20.5–51.1)
LYMPHOCYTE ABSOLUTE #: 3.2 10*3/UL (ref 1.2–3.4)
MAGNESIUM SERPL-MCNC: 1.7 MG/DL (ref 1.6–2.6)
MEAN CORPUSCULAR HGB CONCENTRATION: 31 % (ref 32–36)
MEAN CORPUSCULAR HGB: 30.9 PG (ref 27–34)
MEAN CORPUSCULAR VOLUME: 99.8 FL (ref 79–95)
MEAN PLATELET VOLUME: 10.3 FL (ref 8.6–12.4)
MONOCYTE ABSOLUTE #: 1 10*3/UL (ref 0.2–0.9)
MONOCYTE PERCENT: 9.4 % (ref 4.3–12.9)
NEUTROPHIL ABSOLUTE #: 5.7 10*3/UL (ref 1.4–6.5)
NEUTROPHIL PERCENT: 55.7 % (ref 34–73.5)
PLATELET COUNT: 367 10*3/UL (ref 150–400)
PROTHROMBIN TIME, THERAPEUTIC: 10.1 S (ref 9.5–11.5)
PTT: 25.4 S (ref 24–38)
RED BLOOD CELL COUNT: 4.01 10*6/UL (ref 3.7–5.2)
RED CELL DISTRIBUTION WIDTH: 14.9 % (ref 11.3–14.8)
WHITE BLOOD CELL COUNT: 10.1 10*3/UL (ref 4–10)

## 2019-02-25 PROCEDURE — 83735 ASSAY OF MAGNESIUM: CPT | Performed by: INTERNAL MEDICINE

## 2019-02-25 PROCEDURE — 85025 COMPLETE CBC W/AUTO DIFF WBC: CPT | Performed by: INTERNAL MEDICINE

## 2019-02-25 PROCEDURE — 36415 COLL VENOUS BLD VENIPUNCTURE: CPT | Performed by: INTERNAL MEDICINE

## 2019-02-25 PROCEDURE — 85610 PROTHROMBIN TIME: CPT | Performed by: INTERNAL MEDICINE

## 2019-02-25 PROCEDURE — 71046 X-RAY EXAM CHEST 2 VIEWS: CPT | Performed by: RADIOLOGY

## 2019-02-25 PROCEDURE — 85730 THROMBOPLASTIN TIME PARTIAL: CPT | Performed by: INTERNAL MEDICINE

## 2019-02-26 ENCOUNTER — TELEPHONE (OUTPATIENT)
Dept: SCHEDULING | Age: 64
End: 2019-02-26

## 2019-02-26 ENCOUNTER — TELEPHONE (OUTPATIENT)
Dept: INTERNAL MEDICINE | Age: 64
End: 2019-02-26

## 2019-02-26 DIAGNOSIS — R30.0 DYSURIA: Primary | ICD-10-CM

## 2019-02-27 ENCOUNTER — LAB SERVICES (OUTPATIENT)
Dept: LAB | Age: 64
End: 2019-02-27

## 2019-02-27 DIAGNOSIS — R30.0 DYSURIA: ICD-10-CM

## 2019-02-27 LAB
BACTERIA: ABNORMAL
BILIRUBIN URINE: NEGATIVE
BLOOD URINE: ABNORMAL
CLARITY: ABNORMAL
COLOR: YELLOW
GLUCOSE QUALITATIVE U: NEGATIVE
KETONES, URINE: NEGATIVE
LEUKOCYTE ESTERASE URINE: ABNORMAL
MUCOUS: ABNORMAL
NITRITE URINE: NEGATIVE
PH URINE: 5 (ref 5–7)
RED BLOOD CELLS URINE: ABNORMAL (ref 0–3)
SPECIFIC GRAVITY URINE: 1.02 (ref 1–1.03)
SQUAMOUS EPITHELIAL CELLS: ABNORMAL
URINE PROTEIN, QUAL (DIPSTICK): NEGATIVE
UROBILINOGEN URINE: <2
WBC CLUMPS: ABNORMAL
WHITE BLOOD CELLS URINE: >100 (ref 0–5)

## 2019-02-27 PROCEDURE — 87186 SC STD MICRODIL/AGAR DIL: CPT | Performed by: FAMILY MEDICINE

## 2019-02-27 PROCEDURE — 87088 URINE BACTERIA CULTURE: CPT | Performed by: FAMILY MEDICINE

## 2019-02-27 PROCEDURE — 87086 URINE CULTURE/COLONY COUNT: CPT | Performed by: FAMILY MEDICINE

## 2019-02-27 PROCEDURE — 81001 URINALYSIS AUTO W/SCOPE: CPT | Performed by: FAMILY MEDICINE

## 2019-02-27 RX ORDER — CIPROFLOXACIN 250 MG/1
250 TABLET, FILM COATED ORAL 2 TIMES DAILY
Qty: 10 TABLET | Refills: 0 | Status: SHIPPED | OUTPATIENT
Start: 2019-02-27 | End: 2019-03-04

## 2019-03-01 ENCOUNTER — OFFICE VISIT (OUTPATIENT)
Dept: NEPHROLOGY | Age: 64
End: 2019-03-01

## 2019-03-01 VITALS
WEIGHT: 148 LBS | DIASTOLIC BLOOD PRESSURE: 84 MMHG | HEIGHT: 65 IN | BODY MASS INDEX: 24.66 KG/M2 | SYSTOLIC BLOOD PRESSURE: 126 MMHG | HEART RATE: 88 BPM

## 2019-03-01 DIAGNOSIS — I71.40 ABDOMINAL AORTIC ANEURYSM WITHOUT RUPTURE (CMD): ICD-10-CM

## 2019-03-01 DIAGNOSIS — N18.30 CHRONIC KIDNEY DISEASE, STAGE III (MODERATE) (CMD): Primary | ICD-10-CM

## 2019-03-01 DIAGNOSIS — N18.30 CKD (CHRONIC KIDNEY DISEASE) STAGE 3, GFR 30-59 ML/MIN (CMD): ICD-10-CM

## 2019-03-01 DIAGNOSIS — N20.0 KIDNEY STONE: ICD-10-CM

## 2019-03-01 DIAGNOSIS — I12.9 NEPHROSCLEROSIS ARTERIOLAR, STAGE 1-4 OR UNSPECIFIED CHRONIC KIDNEY DISEASE: ICD-10-CM

## 2019-03-01 LAB — FINAL REPORT: ABNORMAL

## 2019-03-01 PROCEDURE — 3074F SYST BP LT 130 MM HG: CPT | Performed by: INTERNAL MEDICINE

## 2019-03-01 PROCEDURE — 99203 OFFICE O/P NEW LOW 30 MIN: CPT | Performed by: INTERNAL MEDICINE

## 2019-03-01 PROCEDURE — 3079F DIAST BP 80-89 MM HG: CPT | Performed by: INTERNAL MEDICINE

## 2019-03-01 SDOH — HEALTH STABILITY: MENTAL HEALTH: HOW OFTEN DO YOU HAVE A DRINK CONTAINING ALCOHOL?: NEVER

## 2019-03-02 ENCOUNTER — LAB SERVICES (OUTPATIENT)
Dept: LAB | Age: 64
End: 2019-03-02

## 2019-03-02 DIAGNOSIS — N18.30 CHRONIC KIDNEY DISEASE, STAGE III (MODERATE) (CMD): ICD-10-CM

## 2019-03-02 DIAGNOSIS — I71.40 ABDOMINAL AORTIC ANEURYSM WITHOUT RUPTURE (CMD): ICD-10-CM

## 2019-03-02 DIAGNOSIS — N20.0 KIDNEY STONE: ICD-10-CM

## 2019-03-02 DIAGNOSIS — I12.9 NEPHROSCLEROSIS ARTERIOLAR, STAGE 1-4 OR UNSPECIFIED CHRONIC KIDNEY DISEASE: ICD-10-CM

## 2019-03-02 DIAGNOSIS — N18.30 CKD (CHRONIC KIDNEY DISEASE) STAGE 3, GFR 30-59 ML/MIN (CMD): ICD-10-CM

## 2019-03-02 LAB
BACTERIA: ABNORMAL
BILIRUBIN URINE: NEGATIVE
BLOOD URINE: ABNORMAL
BUN SERPL-MCNC: 19 MG/DL (ref 7–20)
CALCIUM SERPL-MCNC: 10 MG/DL (ref 8.6–10.6)
CALCIUM SERPL-MCNC: 10 MG/DL (ref 8.6–10.6)
CHLORIDE SERPL-SCNC: 113 MMOL/L (ref 96–107)
CLARITY: ABNORMAL
CO2 SERPL-SCNC: 23 MMOL/L (ref 22–32)
COLOR: YELLOW
CREAT SERPL-MCNC: 1.2 MG/DL (ref 0.5–1.4)
GFR SERPL CREATININE-BSD FRML MDRD: 45 ML/MIN/{1.73M2}
GFR SERPL CREATININE-BSD FRML MDRD: 55 ML/MIN/{1.73M2}
GLUCOSE QUALITATIVE U: NEGATIVE
GLUCOSE SERPL-MCNC: 83 MG/DL (ref 70–200)
HYALINE CAST: ABNORMAL
KETONES, URINE: NEGATIVE
LEUKOCYTE ESTERASE URINE: ABNORMAL
MUCOUS: ABNORMAL
NITRITE URINE: NEGATIVE
NON SQUAMOUS EPITHELIAL CELLS: ABNORMAL
PH URINE: 5 (ref 5–7)
PHOSPHATE SERPL-MCNC: 4 MG/DL (ref 2.5–4.9)
POTASSIUM SERPL-SCNC: 4.4 MMOL/L (ref 3.5–5.3)
PTH-INTACT SERPL-MCNC: 57.3 PG/ML (ref 23–73)
RED BLOOD CELLS URINE: ABNORMAL (ref 0–3)
SODIUM SERPL-SCNC: 140 MMOL/L (ref 136–146)
SPECIFIC GRAVITY URINE: 1.03 (ref 1–1.03)
SQUAMOUS EPITHELIAL CELLS: ABNORMAL
URINE PROTEIN, QUAL (DIPSTICK): NEGATIVE
UROBILINOGEN URINE: <2
WBC CLUMPS: ABNORMAL
WHITE BLOOD CELLS URINE: >100 (ref 0–5)

## 2019-03-02 PROCEDURE — 83970 ASSAY OF PARATHORMONE: CPT | Performed by: INTERNAL MEDICINE

## 2019-03-02 PROCEDURE — 84100 ASSAY OF PHOSPHORUS: CPT | Performed by: INTERNAL MEDICINE

## 2019-03-02 PROCEDURE — 80048 BASIC METABOLIC PNL TOTAL CA: CPT | Performed by: INTERNAL MEDICINE

## 2019-03-02 PROCEDURE — 81001 URINALYSIS AUTO W/SCOPE: CPT | Performed by: INTERNAL MEDICINE

## 2019-03-02 PROCEDURE — 87086 URINE CULTURE/COLONY COUNT: CPT | Performed by: INTERNAL MEDICINE

## 2019-03-02 PROCEDURE — 36415 COLL VENOUS BLD VENIPUNCTURE: CPT | Performed by: INTERNAL MEDICINE

## 2019-03-04 ENCOUNTER — TELEPHONE (OUTPATIENT)
Dept: ENDOCRINOLOGY | Age: 64
End: 2019-03-04

## 2019-03-04 LAB — FINAL REPORT: NORMAL

## 2019-03-05 VITALS
HEART RATE: 102 BPM | OXYGEN SATURATION: 98 % | BODY MASS INDEX: 23.3 KG/M2 | DIASTOLIC BLOOD PRESSURE: 78 MMHG | HEIGHT: 66 IN | WEIGHT: 145 LBS | SYSTOLIC BLOOD PRESSURE: 122 MMHG

## 2019-03-06 VITALS
RESPIRATION RATE: 18 BRPM | OXYGEN SATURATION: 98 % | SYSTOLIC BLOOD PRESSURE: 104 MMHG | TEMPERATURE: 98.7 F | HEART RATE: 90 BPM | DIASTOLIC BLOOD PRESSURE: 74 MMHG

## 2019-03-06 VITALS
DIASTOLIC BLOOD PRESSURE: 84 MMHG | HEART RATE: 87 BPM | WEIGHT: 146 LBS | BODY MASS INDEX: 23.46 KG/M2 | RESPIRATION RATE: 16 BRPM | HEIGHT: 66 IN | OXYGEN SATURATION: 99 % | SYSTOLIC BLOOD PRESSURE: 124 MMHG

## 2019-03-06 VITALS
DIASTOLIC BLOOD PRESSURE: 56 MMHG | RESPIRATION RATE: 18 BRPM | HEART RATE: 82 BPM | TEMPERATURE: 98.1 F | SYSTOLIC BLOOD PRESSURE: 118 MMHG | OXYGEN SATURATION: 99 %

## 2019-03-06 VITALS
WEIGHT: 145 LBS | BODY MASS INDEX: 23.4 KG/M2 | DIASTOLIC BLOOD PRESSURE: 50 MMHG | OXYGEN SATURATION: 98 % | SYSTOLIC BLOOD PRESSURE: 110 MMHG | HEART RATE: 88 BPM

## 2019-03-11 ENCOUNTER — IMAGING SERVICES (OUTPATIENT)
Dept: ULTRASOUND IMAGING | Age: 64
End: 2019-03-11
Attending: INTERNAL MEDICINE

## 2019-03-11 DIAGNOSIS — N20.0 KIDNEY STONE: ICD-10-CM

## 2019-03-11 DIAGNOSIS — I12.9 NEPHROSCLEROSIS ARTERIOLAR, STAGE 1-4 OR UNSPECIFIED CHRONIC KIDNEY DISEASE: ICD-10-CM

## 2019-03-11 DIAGNOSIS — N18.30 CHRONIC KIDNEY DISEASE, STAGE III (MODERATE) (CMD): ICD-10-CM

## 2019-03-11 DIAGNOSIS — N18.30 CKD (CHRONIC KIDNEY DISEASE) STAGE 3, GFR 30-59 ML/MIN (CMD): ICD-10-CM

## 2019-03-11 DIAGNOSIS — I71.40 ABDOMINAL AORTIC ANEURYSM WITHOUT RUPTURE (CMD): ICD-10-CM

## 2019-03-11 PROCEDURE — 76775 US EXAM ABDO BACK WALL LIM: CPT | Performed by: RADIOLOGY

## 2019-03-13 ENCOUNTER — TELEPHONE (OUTPATIENT)
Dept: NEPHROLOGY | Age: 64
End: 2019-03-13

## 2019-03-14 ENCOUNTER — TELEPHONE (OUTPATIENT)
Dept: CARDIOLOGY | Age: 64
End: 2019-03-14

## 2019-03-16 ENCOUNTER — LAB SERVICES (OUTPATIENT)
Dept: LAB | Age: 64
End: 2019-03-16

## 2019-03-20 ENCOUNTER — EXTERNAL RECORD (OUTPATIENT)
Dept: CARDIOLOGY | Age: 64
End: 2019-03-20

## 2019-03-20 ENCOUNTER — TELEPHONE (OUTPATIENT)
Dept: CARDIOLOGY | Age: 64
End: 2019-03-20

## 2019-03-20 DIAGNOSIS — I71.40 ABDOMINAL AORTIC ANEURYSM WITHOUT RUPTURE (CMD): Primary | ICD-10-CM

## 2019-03-20 DIAGNOSIS — I10 HYPERTENSION, UNSPECIFIED TYPE: ICD-10-CM

## 2019-03-20 DIAGNOSIS — Z72.0 TOBACCO USE: ICD-10-CM

## 2019-03-20 DIAGNOSIS — R09.89 OTHER SPECIFIED SYMPTOMS AND SIGNS INVOLVING THE CIRCULATORY AND RESPIRATORY SYSTEMS: ICD-10-CM

## 2019-03-20 PROBLEM — Z98.890 S/P CARDIAC CATH: Status: ACTIVE | Noted: 2019-03-20

## 2019-03-25 ENCOUNTER — LAB SERVICES (OUTPATIENT)
Dept: LAB | Age: 64
End: 2019-03-25

## 2019-03-25 DIAGNOSIS — I71.40 ABDOMINAL AORTIC ANEURYSM WITHOUT RUPTURE (CMD): ICD-10-CM

## 2019-03-25 DIAGNOSIS — R09.89 OTHER SPECIFIED SYMPTOMS AND SIGNS INVOLVING THE CIRCULATORY AND RESPIRATORY SYSTEMS: ICD-10-CM

## 2019-03-25 DIAGNOSIS — Z72.0 TOBACCO USE: ICD-10-CM

## 2019-03-25 DIAGNOSIS — I10 HYPERTENSION, UNSPECIFIED TYPE: ICD-10-CM

## 2019-03-25 LAB
BUN SERPL-MCNC: 21 MG/DL (ref 7–20)
CALCIUM SERPL-MCNC: 10 MG/DL (ref 8.6–10.6)
CHLORIDE SERPL-SCNC: 111 MMOL/L (ref 96–107)
CO2 SERPL-SCNC: 19 MMOL/L (ref 22–32)
CREAT SERPL-MCNC: 1.2 MG/DL (ref 0.5–1.4)
GFR SERPL CREATININE-BSD FRML MDRD: 45 ML/MIN/{1.73M2}
GFR SERPL CREATININE-BSD FRML MDRD: 55 ML/MIN/{1.73M2}
GLUCOSE SERPL-MCNC: 86 MG/DL (ref 70–200)
POTASSIUM SERPL-SCNC: 4.4 MMOL/L (ref 3.5–5.3)
SODIUM SERPL-SCNC: 140 MMOL/L (ref 136–146)

## 2019-03-25 PROCEDURE — 80048 BASIC METABOLIC PNL TOTAL CA: CPT | Performed by: INTERNAL MEDICINE

## 2019-03-25 PROCEDURE — 36415 COLL VENOUS BLD VENIPUNCTURE: CPT | Performed by: INTERNAL MEDICINE

## 2019-03-26 ENCOUNTER — ANCILLARY PROCEDURE (OUTPATIENT)
Dept: CARDIOLOGY | Age: 64
End: 2019-03-26
Attending: INTERNAL MEDICINE

## 2019-03-26 PROCEDURE — 93299 CARDIAC DEVICE CHECK - REMOTE SCHEDULED: CPT | Performed by: INTERNAL MEDICINE

## 2019-03-26 PROCEDURE — 93298 REM INTERROG DEV EVAL SCRMS: CPT | Performed by: INTERNAL MEDICINE

## 2019-03-29 ENCOUNTER — APPOINTMENT (OUTPATIENT)
Dept: NEPHROLOGY | Age: 64
End: 2019-03-29

## 2019-04-03 ENCOUNTER — ANCILLARY PROCEDURE (OUTPATIENT)
Dept: CARDIOLOGY | Age: 64
End: 2019-04-03
Attending: INTERNAL MEDICINE

## 2019-04-03 DIAGNOSIS — I10 HYPERTENSION, UNSPECIFIED TYPE: ICD-10-CM

## 2019-04-03 DIAGNOSIS — I71.40 ABDOMINAL AORTIC ANEURYSM WITHOUT RUPTURE (CMD): ICD-10-CM

## 2019-04-03 DIAGNOSIS — Z72.0 TOBACCO USE: ICD-10-CM

## 2019-04-03 DIAGNOSIS — R09.89 OTHER SPECIFIED SYMPTOMS AND SIGNS INVOLVING THE CIRCULATORY AND RESPIRATORY SYSTEMS: ICD-10-CM

## 2019-04-03 PROCEDURE — 93925 LOWER EXTREMITY STUDY: CPT | Performed by: SURGERY

## 2019-04-09 ENCOUNTER — TELEPHONE (OUTPATIENT)
Dept: CARDIOLOGY | Age: 64
End: 2019-04-09

## 2019-04-10 ENCOUNTER — WALK IN (OUTPATIENT)
Dept: URGENT CARE | Age: 64
End: 2019-04-10

## 2019-04-10 ENCOUNTER — TELEPHONE (OUTPATIENT)
Dept: INTERNAL MEDICINE | Age: 64
End: 2019-04-10

## 2019-04-10 VITALS
SYSTOLIC BLOOD PRESSURE: 130 MMHG | OXYGEN SATURATION: 98 % | HEART RATE: 92 BPM | DIASTOLIC BLOOD PRESSURE: 80 MMHG | TEMPERATURE: 97.5 F | RESPIRATION RATE: 20 BRPM

## 2019-04-10 DIAGNOSIS — R35.0 FREQUENT URINATION: Primary | ICD-10-CM

## 2019-04-10 LAB
BACTERIA: ABNORMAL
BILIRUBIN URINE: ABNORMAL
BLOOD URINE: ABNORMAL
CLARITY: ABNORMAL
COLOR: YELLOW
GLUCOSE QUALITATIVE U: NEGATIVE
KETONES, URINE: ABNORMAL
LEUKOCYTE ESTERASE URINE: ABNORMAL
NITRITE URINE: NEGATIVE
PH URINE: 6 (ref 5–7)
RED BLOOD CELLS URINE: >100 (ref 0–3)
SPECIFIC GRAVITY URINE: 1.02 (ref 1–1.03)
SQUAMOUS EPITHELIAL CELLS: ABNORMAL
URINE PROTEIN, QUAL (DIPSTICK): ABNORMAL
UROBILINOGEN URINE: ABNORMAL
WHITE BLOOD CELLS URINE: ABNORMAL (ref 0–5)

## 2019-04-10 PROCEDURE — 87086 URINE CULTURE/COLONY COUNT: CPT | Performed by: FAMILY MEDICINE

## 2019-04-10 PROCEDURE — 87088 URINE BACTERIA CULTURE: CPT | Performed by: FAMILY MEDICINE

## 2019-04-10 PROCEDURE — 99214 OFFICE O/P EST MOD 30 MIN: CPT | Performed by: FAMILY MEDICINE

## 2019-04-10 PROCEDURE — 87186 SC STD MICRODIL/AGAR DIL: CPT | Performed by: FAMILY MEDICINE

## 2019-04-10 PROCEDURE — 3075F SYST BP GE 130 - 139MM HG: CPT | Performed by: FAMILY MEDICINE

## 2019-04-10 PROCEDURE — 3079F DIAST BP 80-89 MM HG: CPT | Performed by: FAMILY MEDICINE

## 2019-04-10 PROCEDURE — 81001 URINALYSIS AUTO W/SCOPE: CPT | Performed by: FAMILY MEDICINE

## 2019-04-10 RX ORDER — NITROFURANTOIN 25; 75 MG/1; MG/1
100 CAPSULE ORAL 2 TIMES DAILY
Qty: 14 CAPSULE | Refills: 0 | Status: SHIPPED | OUTPATIENT
Start: 2019-04-10 | End: 2019-04-17

## 2019-04-12 ENCOUNTER — WALK IN (OUTPATIENT)
Dept: URGENT CARE | Age: 64
End: 2019-04-12

## 2019-04-12 DIAGNOSIS — N39.0 URINARY TRACT INFECTION WITH HEMATURIA, SITE UNSPECIFIED: Primary | ICD-10-CM

## 2019-04-12 DIAGNOSIS — R30.0 DYSURIA: ICD-10-CM

## 2019-04-12 DIAGNOSIS — R31.9 URINARY TRACT INFECTION WITH HEMATURIA, SITE UNSPECIFIED: Primary | ICD-10-CM

## 2019-04-12 LAB
BILIRUBIN URINE: NEGATIVE
BLOOD URINE: ABNORMAL
CLARITY: ABNORMAL
COLOR: ABNORMAL
FINAL REPORT: ABNORMAL
GLUCOSE QUALITATIVE U: NEGATIVE
KETONES, URINE: NEGATIVE
LEUKOCYTE ESTERASE URINE: ABNORMAL
NITRITE URINE: NEGATIVE
PH URINE: 5 (ref 5–7)
RED BLOOD CELLS URINE: ABNORMAL (ref 0–3)
SPECIFIC GRAVITY URINE: 1.02 (ref 1–1.03)
SQUAMOUS EPITHELIAL CELLS: 0
URINE PROTEIN, QUAL (DIPSTICK): 100
UROBILINOGEN URINE: <2
WHITE BLOOD CELLS URINE: ABNORMAL (ref 0–5)

## 2019-04-12 PROCEDURE — 3074F SYST BP LT 130 MM HG: CPT | Performed by: FAMILY MEDICINE

## 2019-04-12 PROCEDURE — 87086 URINE CULTURE/COLONY COUNT: CPT | Performed by: FAMILY MEDICINE

## 2019-04-12 PROCEDURE — 3078F DIAST BP <80 MM HG: CPT | Performed by: FAMILY MEDICINE

## 2019-04-12 PROCEDURE — 81001 URINALYSIS AUTO W/SCOPE: CPT | Performed by: FAMILY MEDICINE

## 2019-04-12 PROCEDURE — 99214 OFFICE O/P EST MOD 30 MIN: CPT | Performed by: FAMILY MEDICINE

## 2019-04-12 SDOH — HEALTH STABILITY: MENTAL HEALTH: HOW OFTEN DO YOU HAVE A DRINK CONTAINING ALCOHOL?: NEVER

## 2019-04-12 ASSESSMENT — PAIN SCALES - GENERAL: PAINLEVEL: 0

## 2019-04-13 LAB
*MEAN CORPUSCULAR HGB CONC: 33.5 G/DL (ref 32.5–35.8)
A/G RATIO: 1.41 (ref 1.1–2.4)
ALANINE AMINOTRANSFE: 16 U/L (ref 7–52)
ALBUMIN, SERUM (ALB): 4.1 G/DL (ref 3.5–5.7)
ALKALINE PHOSPHATASE (ALK): 88 U/L (ref 34–104)
ANION GAP: 10 MEQ/L (ref 8–16)
APPEARANCE: CLEAR
ASPARTATE AMINOTRANS: 17 U/L (ref 13–39)
BACTERIA: ABNORMAL /HPF
BASOPHIL AUTOMATED: 0.9 %
BASOPHILS: 0.1 (ref 0–0.2)
BILIRUBIN, TOTAL: 0.3 MG/DL (ref 0.2–0.8)
BILIRUBIN: ABNORMAL
BLOOD UREA NITROGEN (BUN): 19 MG/DL (ref 7–25)
BUN/CREATININE RATIO: 14.4 (ref 7.4–23)
CALCIUM, SERUM: 9.4 MG/DL (ref 8.6–10.3)
CARBON DIOXIDE: 21 MMOL/L (ref 21–31)
CHLORIDE, SERUM: 110 MMOL/L (ref 98–107)
COLOR: YELLOW
CREATININE: 1.32 MG/DL (ref 0.6–1.2)
CULTURE REFLEX COMMENT: YES
EOSINOPHIL AUTOMATED: 2.2 %
EOSINOPHILS: 0.2 (ref 0–0.5)
EST GLOMERULAR FILTRATION RATE: 40 1.73M SQ
GLUCOSE, URINE, AUTOMATED: ABNORMAL MG/DL
GLUCOSE: 77 MG/DL (ref 70–99)
HEMATOCRIT: 35.8 % (ref 34.7–45.1)
HEMOGLOBIN: 12 GM/DL (ref 12–15.3)
K (POTASSIUM, SERUM): 4.3 MMOL/L (ref 3.5–5.1)
KETONES, URINE, AUTOMATED: ABNORMAL MG/DL
LEUKOCYTE, URINE, AUTOMATED: ABNORMAL
LIPASE: 42 U/L (ref 11–82)
LYMPHOCYTE AUTOMATED: 20.2 %
LYMPHOCYTES: 2.1 (ref 0.6–3.4)
MEAN CORPUSCULAR HGB: 31.6 PG (ref 26–34)
MEAN CORPUSCULAR VOL: 94.4 FL (ref 80–100)
MEAN PLATELET VOLUME: 7.4 FL (ref 6.8–10.2)
MONOCYTE AUTOMATED: 8.2 %
MONOCYTES: 0.9 (ref 0.3–1)
NA (SODIUM, SERUM): 141 MMOL/L (ref 133–144)
NEUTROPHIL ABSOLUTE: 7.2 (ref 1.7–7.7)
NEUTROPHIL AUTOMATED: 68.5 %
NITRITE, URINE AUTOMATED: NEGATIVE
PH, URINE: 5 (ref 5–8)
PLATELET COUNT: 394 K/MM3 (ref 150–450)
PROTEIN TOTAL: 7 G/DL (ref 6.4–8.9)
PROTEIN, URINE: ABNORMAL MG/DL
RBC: >100 /HPF (ref 0–2)
RED BLOOD CELL COUNT: 3.79 M/MM3 (ref 3.63–5.04)
RED CELL DISTRIBUTIO: 14.7 % (ref 11.9–15.9)
SPECIFIC GRAVITY UA: 1.01 (ref 1–1.03)
SQUAMOUS EPITHELIAL: ABNORMAL /LPF
URINE, BLOOD, AUTOMATED: ABNORMAL
UROBILINOGEN, URINE, AUTOMATED: ABNORMAL MG/DL
WBC: ABNORMAL /HPF (ref 0–5)
WHITE BLOOD CELL COU: 10.5 K/MM3 (ref 4–11)

## 2019-04-14 LAB — URINE CULTURE: NORMAL

## 2019-04-15 ENCOUNTER — TELEPHONE (OUTPATIENT)
Dept: UROLOGY | Age: 64
End: 2019-04-15

## 2019-04-15 LAB — FINAL REPORT: NORMAL

## 2019-04-16 ENCOUNTER — TELEPHONE (OUTPATIENT)
Dept: CARDIOLOGY | Age: 64
End: 2019-04-16

## 2019-04-17 RX ORDER — APIXABAN 5 MG/1
TABLET, FILM COATED ORAL
Qty: 180 TABLET | Refills: 1 | Status: SHIPPED | OUTPATIENT
Start: 2019-04-17 | End: 2019-10-11 | Stop reason: SDUPTHER

## 2019-04-18 ENCOUNTER — APPOINTMENT (OUTPATIENT)
Dept: UROLOGY | Age: 64
End: 2019-04-18

## 2019-04-22 ASSESSMENT — ENCOUNTER SYMPTOMS
ENDOCRINE NEGATIVE: 1
EYES NEGATIVE: 1
HEMATOLOGIC/LYMPHATIC NEGATIVE: 1
ALLERGIC/IMMUNOLOGIC NEGATIVE: 1
GASTROINTESTINAL NEGATIVE: 1
CONSTITUTIONAL NEGATIVE: 1
PSYCHIATRIC NEGATIVE: 1

## 2019-04-23 ENCOUNTER — OFFICE VISIT (OUTPATIENT)
Dept: CARDIOLOGY | Age: 64
End: 2019-04-23

## 2019-04-23 ENCOUNTER — TELEPHONE (OUTPATIENT)
Dept: CARDIOLOGY | Age: 64
End: 2019-04-23

## 2019-04-23 VITALS
HEART RATE: 80 BPM | BODY MASS INDEX: 25.3 KG/M2 | DIASTOLIC BLOOD PRESSURE: 78 MMHG | HEIGHT: 66 IN | OXYGEN SATURATION: 100 % | SYSTOLIC BLOOD PRESSURE: 134 MMHG | WEIGHT: 157.4 LBS | RESPIRATION RATE: 20 BRPM

## 2019-04-23 DIAGNOSIS — Z72.0 TOBACCO USE: ICD-10-CM

## 2019-04-23 DIAGNOSIS — I10 ESSENTIAL HYPERTENSION: ICD-10-CM

## 2019-04-23 DIAGNOSIS — G43.809 OTHER MIGRAINE WITHOUT STATUS MIGRAINOSUS, NOT INTRACTABLE: ICD-10-CM

## 2019-04-23 DIAGNOSIS — I26.99 PE (PULMONARY THROMBOEMBOLISM) (CMD): ICD-10-CM

## 2019-04-23 DIAGNOSIS — N18.30 CKD (CHRONIC KIDNEY DISEASE) STAGE 3, GFR 30-59 ML/MIN (CMD): ICD-10-CM

## 2019-04-23 DIAGNOSIS — E78.00 PURE HYPERCHOLESTEROLEMIA: ICD-10-CM

## 2019-04-23 DIAGNOSIS — I71.40 ABDOMINAL AORTIC ANEURYSM WITHOUT RUPTURE (CMD): Primary | ICD-10-CM

## 2019-04-23 DIAGNOSIS — G45.9 TIA (TRANSIENT ISCHEMIC ATTACK): ICD-10-CM

## 2019-04-23 PROCEDURE — 3078F DIAST BP <80 MM HG: CPT | Performed by: INTERNAL MEDICINE

## 2019-04-23 PROCEDURE — 99215 OFFICE O/P EST HI 40 MIN: CPT | Performed by: INTERNAL MEDICINE

## 2019-04-23 PROCEDURE — 3075F SYST BP GE 130 - 139MM HG: CPT | Performed by: INTERNAL MEDICINE

## 2019-04-23 SDOH — HEALTH STABILITY: MENTAL HEALTH: HOW OFTEN DO YOU HAVE A DRINK CONTAINING ALCOHOL?: NEVER

## 2019-04-29 ENCOUNTER — ANCILLARY PROCEDURE (OUTPATIENT)
Dept: CARDIOLOGY | Age: 64
End: 2019-04-29
Attending: INTERNAL MEDICINE

## 2019-04-29 DIAGNOSIS — G45.9 TIA (TRANSIENT ISCHEMIC ATTACK): ICD-10-CM

## 2019-04-29 PROCEDURE — 93298 REM INTERROG DEV EVAL SCRMS: CPT | Performed by: INTERNAL MEDICINE

## 2019-04-29 PROCEDURE — 93299 CARDIAC DEVICE HOME CHECK - REMOTE SCHEDULED: CPT | Performed by: INTERNAL MEDICINE

## 2019-04-30 ENCOUNTER — APPOINTMENT (OUTPATIENT)
Dept: CARDIOLOGY | Age: 64
End: 2019-04-30
Attending: INTERNAL MEDICINE

## 2019-05-31 ENCOUNTER — ANCILLARY PROCEDURE (OUTPATIENT)
Dept: CARDIOLOGY | Age: 64
End: 2019-05-31
Attending: INTERNAL MEDICINE

## 2019-05-31 DIAGNOSIS — G45.9 TIA (TRANSIENT ISCHEMIC ATTACK): ICD-10-CM

## 2019-05-31 PROCEDURE — 93299 CARDIAC DEVICE HOME CHECK - REMOTE SCHEDULED: CPT | Performed by: INTERNAL MEDICINE

## 2019-05-31 PROCEDURE — 93298 REM INTERROG DEV EVAL SCRMS: CPT | Performed by: INTERNAL MEDICINE

## 2019-06-10 LAB
*MEAN CORPUSCULAR HGB CONC: 33.5 G/DL (ref 32.5–35.8)
ANION GAP: 7 MEQ/L (ref 8–16)
APPEARANCE: ABNORMAL
BACTERIA: ABNORMAL /HPF
BASOPHIL AUTOMATED: 0.6 %
BASOPHILS: 0.1 (ref 0–0.2)
BILIRUBIN: ABNORMAL
BLOOD UREA NITROGEN (BUN): 23 MG/DL (ref 7–25)
BUN/CREATININE RATIO: 17.2 (ref 7.4–23)
CALCIUM, SERUM: 10 MG/DL (ref 8.6–10.3)
CARBON DIOXIDE: 23 MMOL/L (ref 21–31)
CHLORIDE, SERUM: 109 MMOL/L (ref 98–107)
COLOR: YELLOW
CREATININE: 1.34 MG/DL (ref 0.6–1.2)
CULTURE REFLEX COMMENT: YES
EOSINOPHIL AUTOMATED: 2.5 %
EOSINOPHILS: 0.3 (ref 0–0.5)
EST GLOMERULAR FILTRATION RATE: 40 1.73M SQ
GLUCOSE, URINE, AUTOMATED: ABNORMAL MG/DL
GLUCOSE: 84 MG/DL (ref 70–99)
HEMATOCRIT: 38.7 % (ref 34.7–45.1)
HEMOGLOBIN: 12.9 GM/DL (ref 12–15.3)
INTERNATIONAL NORMAL: 0.9 (ref 0.8–1.1)
K (POTASSIUM, SERUM): 4.6 MMOL/L (ref 3.5–5.1)
KETONES, URINE, AUTOMATED: ABNORMAL MG/DL
LEUKOCYTE, URINE, AUTOMATED: ABNORMAL
LYMPHOCYTE AUTOMATED: 21.6 %
LYMPHOCYTES: 2.4 (ref 0.6–3.4)
MEAN CORPUSCULAR HGB: 32.2 PG (ref 26–34)
MEAN CORPUSCULAR VOL: 96.1 FL (ref 80–100)
MEAN PLATELET VOLUME: 8.1 FL (ref 6.8–10.2)
MONOCYTE AUTOMATED: 7.3 %
MONOCYTES: 0.8 (ref 0.3–1)
NA (SODIUM, SERUM): 139 MMOL/L (ref 133–144)
NEUTROPHIL ABSOLUTE: 7.7 (ref 1.7–7.7)
NEUTROPHIL AUTOMATED: 68 %
NITRITE, URINE AUTOMATED: NEGATIVE
PH, URINE: 5 (ref 5–8)
PLATELET COUNT: 422 K/MM3 (ref 150–450)
PROTEIN, URINE: ABNORMAL MG/DL
PROTHROMBIN TIME (PATIENT): 11.3 SECONDS (ref 10.1–13.1)
PTT: 32 SECONDS (ref 25–36)
RBC: ABNORMAL /HPF (ref 0–2)
RED BLOOD CELL COUNT: 4.02 M/MM3 (ref 3.63–5.04)
RED CELL DISTRIBUTIO: 14.2 % (ref 11.9–15.9)
SPECIFIC GRAVITY UA: 1.02 (ref 1–1.03)
SQUAMOUS EPITHELIAL: ABNORMAL /LPF
URINE, BLOOD, AUTOMATED: ABNORMAL
UROBILINOGEN, URINE, AUTOMATED: ABNORMAL MG/DL
WBC: ABNORMAL /HPF (ref 0–5)
WHITE BLOOD CELL COU: 11.3 K/MM3 (ref 4–11)

## 2019-06-12 ENCOUNTER — APPOINTMENT (OUTPATIENT)
Dept: OTHER | Facility: PHYSICIAN GROUP | Age: 64
End: 2019-06-12

## 2019-06-12 ENCOUNTER — EXTERNAL RECORD (OUTPATIENT)
Dept: OTHER | Age: 64
End: 2019-06-12

## 2019-06-12 ENCOUNTER — EXTERNAL RECORD (OUTPATIENT)
Dept: CARDIOLOGY | Age: 64
End: 2019-06-12

## 2019-06-12 ENCOUNTER — TELEPHONE (OUTPATIENT)
Dept: CARDIOLOGY | Age: 64
End: 2019-06-12

## 2019-06-12 ENCOUNTER — DOCUMENTATION (OUTPATIENT)
Dept: CARDIOLOGY | Age: 64
End: 2019-06-12

## 2019-06-12 DIAGNOSIS — I25.119 CORONARY ARTERY DISEASE INVOLVING NATIVE CORONARY ARTERY OF NATIVE HEART WITH ANGINA PECTORIS (CMD): ICD-10-CM

## 2019-06-12 DIAGNOSIS — I71.40 ABDOMINAL AORTIC ANEURYSM WITHOUT RUPTURE (CMD): Primary | ICD-10-CM

## 2019-06-12 LAB
AMOXACILLIN/CLAVULANATE: ABNORMAL
AMPICILLIN/SULBACTAM: ABNORMAL
AMPICILLIN: <=8
CEFAZOLIN: <=2
CIPROFLOXACIN: <=1
ERTAPENEM: <=0.5
GENTAMICIN: <=1
IMIPENEM: <=0.5
LEVOFLOXACIN: <=0.25
MEROPENEM: <=1
NITROFURANTOIN: <=32
PIPERACILLIN/TAZOBACTAM: <=4
TETRACYCLINE: <=4
TRIMETHOPRIM/SULFAMETHOXAZOLE: ABNORMAL
URINE CULTURE: NORMAL
URINE CULTURE: NORMAL

## 2019-06-12 ASSESSMENT — ENCOUNTER SYMPTOMS
EYES NEGATIVE: 1
CONSTITUTIONAL NEGATIVE: 1
HEMATOLOGIC/LYMPHATIC NEGATIVE: 1
ALLERGIC/IMMUNOLOGIC NEGATIVE: 1
PSYCHIATRIC NEGATIVE: 1
ENDOCRINE NEGATIVE: 1
GASTROINTESTINAL NEGATIVE: 1

## 2019-06-13 ENCOUNTER — TELEPHONE (OUTPATIENT)
Dept: CARDIOLOGY | Age: 64
End: 2019-06-13

## 2019-06-17 ENCOUNTER — TELEPHONE (OUTPATIENT)
Dept: CARDIOLOGY | Age: 64
End: 2019-06-17

## 2019-06-18 ENCOUNTER — TELEPHONE (OUTPATIENT)
Dept: CARDIOLOGY | Age: 64
End: 2019-06-18

## 2019-06-18 ENCOUNTER — WALK IN (OUTPATIENT)
Dept: URGENT CARE | Age: 64
End: 2019-06-18

## 2019-06-18 DIAGNOSIS — I95.9 HYPOTENSION, UNSPECIFIED HYPOTENSION TYPE: ICD-10-CM

## 2019-06-18 DIAGNOSIS — R10.829 REBOUND TENDERNESS: ICD-10-CM

## 2019-06-18 DIAGNOSIS — R10.32 LEFT LOWER QUADRANT ABDOMINAL PAIN OF UNKNOWN ETIOLOGY: Primary | ICD-10-CM

## 2019-06-18 LAB
*MEAN CORPUSCULAR HGB CONC: 32.9 G/DL (ref 32.5–35.8)
A/G RATIO: 1.24 (ref 1.1–2.4)
ALANINE AMINOTRANSFE: 7 U/L (ref 7–52)
ALBUMIN, SERUM (ALB): 4.1 G/DL (ref 3.5–5.7)
ALKALINE PHOSPHATASE (ALK): 71 U/L (ref 34–104)
ANION GAP: 10 MEQ/L (ref 8–16)
APPEARANCE: ABNORMAL
ASPARTATE AMINOTRANS: 12 U/L (ref 13–39)
BASOPHIL AUTOMATED: 0.5 %
BASOPHILS: 0.1 (ref 0–0.2)
BEDSIDE VENOUS BASE EXCESS: -7 MMOL/L (ref -2–3)
BEDSIDE VENOUS HCO3: 19 MMOL/L (ref 23–28)
BEDSIDE VENOUS LACTATE: 0.48 MMOL/L (ref 0.9–2)
BEDSIDE VENOUS O2 SATURATION: 43 %
BEDSIDE VENOUS PCO2: 36 MMHG (ref 41–51)
BEDSIDE VENOUS PH: 7.32 (ref 7.31–7.41)
BEDSIDE VENOUS PO2: 26 MMHG
BEDSIDE VENOUS TCO2: 20 MMOL/L (ref 24–29)
BILIRUBIN, TOTAL: 0.3 MG/DL (ref 0.2–0.8)
BILIRUBIN: ABNORMAL
BLOOD UREA NITROGEN (BUN): 20 MG/DL (ref 7–25)
BUN/CREATININE RATIO: 18.7 (ref 7.4–23)
CALCIUM, SERUM: 9.6 MG/DL (ref 8.6–10.3)
CARBON DIOXIDE: 23 MMOL/L (ref 21–31)
CHLORIDE, SERUM: 107 MMOL/L (ref 98–107)
COLOR: YELLOW
CREATINE KINASE: 48 U/L (ref 30–223)
CREATININE: 1.07 MG/DL (ref 0.6–1.2)
CULTURE REFLEX COMMENT: YES
EOSINOPHIL AUTOMATED: 2.9 %
EOSINOPHILS: 0.3 (ref 0–0.5)
EST GLOMERULAR FILTRATION RATE: 51 1.73M SQ
GLUCOSE, URINE, AUTOMATED: ABNORMAL MG/DL
GLUCOSE: 92 MG/DL (ref 70–99)
HEMATOCRIT: 33.5 % (ref 34.7–45.1)
HEMOGLOBIN: 11 GM/DL (ref 12–15.3)
INTERNATIONAL NORMAL: 1.3 (ref 0.8–1.1)
K (POTASSIUM, SERUM): 3.9 MMOL/L (ref 3.5–5.1)
KETONES, URINE, AUTOMATED: ABNORMAL MG/DL
LEUKOCYTE, URINE, AUTOMATED: ABNORMAL
LIPASE: 24 U/L (ref 11–82)
LYMPHOCYTE AUTOMATED: 16.9 %
LYMPHOCYTES: 1.9 (ref 0.6–3.4)
MEAN CORPUSCULAR HGB: 31.6 PG (ref 26–34)
MEAN CORPUSCULAR VOL: 95.8 FL (ref 80–100)
MEAN PLATELET VOLUME: 8.3 FL (ref 6.8–10.2)
MONOCYTE AUTOMATED: 11.9 %
MONOCYTES: 1.4 (ref 0.3–1)
NA (SODIUM, SERUM): 140 MMOL/L (ref 133–144)
NEUTROPHIL ABSOLUTE: 7.8 (ref 1.7–7.7)
NEUTROPHIL AUTOMATED: 67.8 %
NITRITE, URINE AUTOMATED: NEGATIVE
PH, URINE: 5 (ref 5–8)
PLATELET COUNT: 386 K/MM3 (ref 150–450)
PROTEIN TOTAL: 7.4 G/DL (ref 6.4–8.9)
PROTEIN, URINE: ABNORMAL MG/DL
PROTHROMBIN TIME (PATIENT): 15.4 SECONDS (ref 10.1–13.1)
PTT: 32 SECONDS (ref 25–36)
RBC: ABNORMAL /HPF (ref 0–2)
RED BLOOD CELL COUNT: 3.5 M/MM3 (ref 3.63–5.04)
RED CELL DISTRIBUTIO: 13.9 % (ref 11.9–15.9)
SPECIFIC GRAVITY UA: 1.03 (ref 1–1.03)
SQUAMOUS EPITHELIAL: ABNORMAL /LPF
TROPONIN I (TROP): < 0.03 NG/ML (ref 0–0.04)
URINE, BLOOD, AUTOMATED: ABNORMAL
UROBILINOGEN, URINE, AUTOMATED: ABNORMAL MG/DL
WBC: ABNORMAL /HPF (ref 0–5)
WHITE BLOOD CELL COU: 11.5 K/MM3 (ref 4–11)

## 2019-06-18 PROCEDURE — 99204 OFFICE O/P NEW MOD 45 MIN: CPT | Performed by: PEDIATRICS

## 2019-06-19 ENCOUNTER — TELEPHONE (OUTPATIENT)
Dept: URGENT CARE | Age: 64
End: 2019-06-19

## 2019-06-20 ENCOUNTER — TELEPHONE (OUTPATIENT)
Dept: CARDIOLOGY | Age: 64
End: 2019-06-20

## 2019-06-20 ENCOUNTER — TELEPHONE (OUTPATIENT)
Dept: INTERNAL MEDICINE | Age: 64
End: 2019-06-20

## 2019-06-20 ENCOUNTER — LAB SERVICES (OUTPATIENT)
Dept: LAB | Age: 64
End: 2019-06-20

## 2019-06-20 DIAGNOSIS — R19.7 DIARRHEA, UNSPECIFIED TYPE: Primary | ICD-10-CM

## 2019-06-20 LAB
AMOXACILLIN/CLAVULANATE: ABNORMAL
AMPICILLIN/SULBACTAM: ABNORMAL
AMPICILLIN: >16
CEFAZOLIN: >16
CEFEPIME: <=2
CEFTRIAXONE: <=1
CIPROFLOXACIN: <=1
ERTAPENEM: <=0.5
GENTAMICIN: >8
IMIPENEM: <=0.5
LEVOFLOXACIN: 1
MEROPENEM: <=1
NITROFURANTOIN: <=32
PIPERACILLIN/TAZOBACTAM: 8
TETRACYCLINE: >8
TOBRAMYCIN: 8
TRIMETHOPRIM/SULFAMETHOXAZOLE: ABNORMAL
URINE CULTURE: NORMAL
URINE CULTURE: NORMAL

## 2019-06-20 ASSESSMENT — ENCOUNTER SYMPTOMS
ENDOCRINE NEGATIVE: 1
PSYCHIATRIC NEGATIVE: 1
ALLERGIC/IMMUNOLOGIC NEGATIVE: 1
EYES NEGATIVE: 1
GASTROINTESTINAL NEGATIVE: 1
CONSTITUTIONAL NEGATIVE: 1
HEMATOLOGIC/LYMPHATIC NEGATIVE: 1

## 2019-06-21 ENCOUNTER — TELEPHONE (OUTPATIENT)
Dept: INTERNAL MEDICINE | Age: 64
End: 2019-06-21

## 2019-06-21 ENCOUNTER — APPOINTMENT (OUTPATIENT)
Dept: CARDIOLOGY | Age: 64
End: 2019-06-21

## 2019-06-21 ENCOUNTER — LAB SERVICES (OUTPATIENT)
Dept: LAB | Age: 64
End: 2019-06-21

## 2019-06-21 DIAGNOSIS — R19.7 DIARRHEA, UNSPECIFIED TYPE: ICD-10-CM

## 2019-06-21 PROCEDURE — 87493 C DIFF AMPLIFIED PROBE: CPT | Performed by: FAMILY MEDICINE

## 2019-06-21 PROCEDURE — 87899 AGENT NOS ASSAY W/OPTIC: CPT | Performed by: FAMILY MEDICINE

## 2019-06-21 PROCEDURE — 87045 FECES CULTURE AEROBIC BACT: CPT | Performed by: FAMILY MEDICINE

## 2019-06-22 LAB — C DIFF TOX A+B STL QL IA: NEGATIVE

## 2019-06-24 ENCOUNTER — OFFICE VISIT (OUTPATIENT)
Dept: INTERNAL MEDICINE | Age: 64
End: 2019-06-24

## 2019-06-24 ENCOUNTER — LAB SERVICES (OUTPATIENT)
Dept: LAB | Age: 64
End: 2019-06-24

## 2019-06-24 ENCOUNTER — APPOINTMENT (OUTPATIENT)
Dept: INTERNAL MEDICINE | Age: 64
End: 2019-06-24

## 2019-06-24 VITALS
OXYGEN SATURATION: 98 % | DIASTOLIC BLOOD PRESSURE: 76 MMHG | HEART RATE: 72 BPM | WEIGHT: 152 LBS | SYSTOLIC BLOOD PRESSURE: 112 MMHG | HEIGHT: 66 IN | BODY MASS INDEX: 24.43 KG/M2

## 2019-06-24 DIAGNOSIS — N39.0 URINARY TRACT INFECTION WITHOUT HEMATURIA, SITE UNSPECIFIED: ICD-10-CM

## 2019-06-24 DIAGNOSIS — R19.7 DIARRHEA, UNSPECIFIED TYPE: ICD-10-CM

## 2019-06-24 DIAGNOSIS — Z98.890 S/P AAA (ABDOMINAL AORTIC ANEURYSM) REPAIR: ICD-10-CM

## 2019-06-24 DIAGNOSIS — Z86.79 S/P AAA (ABDOMINAL AORTIC ANEURYSM) REPAIR: ICD-10-CM

## 2019-06-24 DIAGNOSIS — R19.7 DIARRHEA, UNSPECIFIED TYPE: Primary | ICD-10-CM

## 2019-06-24 LAB
APPEARANCE SPEC: NORMAL
BUN SERPL-MCNC: 13 MG/DL (ref 7–20)
CALCIUM SERPL-MCNC: 10 MG/DL (ref 8.6–10.6)
CHLORIDE SERPL-SCNC: 113 MMOL/L (ref 96–107)
CO2 SERPL-SCNC: 21 MMOL/L (ref 22–32)
CREAT SERPL-MCNC: 1.1 MG/DL (ref 0.5–1.4)
FINAL REPORT: NORMAL
GFR SERPL CREATININE-BSD FRML MDRD: 50 ML/MIN/{1.73M2}
GFR SERPL CREATININE-BSD FRML MDRD: >60 ML/MIN/{1.73M2}
GLUCOSE SERPL-MCNC: 86 MG/DL (ref 70–200)
POTASSIUM SERPL-SCNC: 4.7 MMOL/L (ref 3.5–5.3)
SODIUM SERPL-SCNC: 144 MMOL/L (ref 136–146)

## 2019-06-24 PROCEDURE — 3078F DIAST BP <80 MM HG: CPT | Performed by: FAMILY MEDICINE

## 2019-06-24 PROCEDURE — 80048 BASIC METABOLIC PNL TOTAL CA: CPT | Performed by: FAMILY MEDICINE

## 2019-06-24 PROCEDURE — 99213 OFFICE O/P EST LOW 20 MIN: CPT | Performed by: FAMILY MEDICINE

## 2019-06-24 PROCEDURE — 3074F SYST BP LT 130 MM HG: CPT | Performed by: FAMILY MEDICINE

## 2019-06-24 PROCEDURE — 36415 COLL VENOUS BLD VENIPUNCTURE: CPT | Performed by: FAMILY MEDICINE

## 2019-06-24 SDOH — HEALTH STABILITY: MENTAL HEALTH: HOW OFTEN DO YOU HAVE A DRINK CONTAINING ALCOHOL?: NEVER

## 2019-06-24 ASSESSMENT — COGNITIVE AND FUNCTIONAL STATUS - GENERAL
DO YOU HAVE SERIOUS DIFFICULTY WALKING OR CLIMBING STAIRS: YES
BECAUSE OF A PHYSICAL, MENTAL, OR EMOTIONAL CONDITION, DO YOU HAVE SERIOUS DIFFICULTY CONCENTRATING, REMEMBERING OR MAKING DECISIONS: NO
DO YOU HAVE DIFFICULTY DRESSING OR BATHING: NO
BECAUSE OF A PHYSICAL, MENTAL, OR EMOTIONAL CONDITION, DO YOU HAVE DIFFICULTY DOING ERRANDS ALONE: NO

## 2019-06-24 ASSESSMENT — PATIENT HEALTH QUESTIONNAIRE - PHQ9
1. LITTLE INTEREST OR PLEASURE IN DOING THINGS: NOT AT ALL
SUM OF ALL RESPONSES TO PHQ9 QUESTIONS 1 AND 2: 0
2. FEELING DOWN, DEPRESSED OR HOPELESS: NOT AT ALL
SUM OF ALL RESPONSES TO PHQ9 QUESTIONS 1 AND 2: 0

## 2019-06-27 ENCOUNTER — APPOINTMENT (OUTPATIENT)
Dept: CARDIOLOGY | Age: 64
End: 2019-06-27

## 2019-06-29 ASSESSMENT — ENCOUNTER SYMPTOMS
ENDOCRINE NEGATIVE: 1
EYES NEGATIVE: 1
ALLERGIC/IMMUNOLOGIC NEGATIVE: 1
GASTROINTESTINAL NEGATIVE: 1
CONSTITUTIONAL NEGATIVE: 1
HEMATOLOGIC/LYMPHATIC NEGATIVE: 1
PSYCHIATRIC NEGATIVE: 1

## 2019-07-02 ENCOUNTER — TELEPHONE (OUTPATIENT)
Dept: CARDIOLOGY | Age: 64
End: 2019-07-02

## 2019-07-02 ENCOUNTER — E-ADVICE (OUTPATIENT)
Dept: CARDIOLOGY | Age: 64
End: 2019-07-02

## 2019-07-02 ENCOUNTER — OFFICE VISIT (OUTPATIENT)
Dept: CARDIOLOGY | Age: 64
End: 2019-07-02

## 2019-07-02 VITALS
DIASTOLIC BLOOD PRESSURE: 68 MMHG | SYSTOLIC BLOOD PRESSURE: 124 MMHG | WEIGHT: 151.6 LBS | RESPIRATION RATE: 16 BRPM | BODY MASS INDEX: 24.36 KG/M2 | HEART RATE: 74 BPM | OXYGEN SATURATION: 98 % | HEIGHT: 66 IN

## 2019-07-02 DIAGNOSIS — Z72.0 TOBACCO USE: ICD-10-CM

## 2019-07-02 DIAGNOSIS — E78.00 PURE HYPERCHOLESTEROLEMIA: ICD-10-CM

## 2019-07-02 DIAGNOSIS — I71.40 ABDOMINAL AORTIC ANEURYSM WITHOUT RUPTURE (CMD): Primary | ICD-10-CM

## 2019-07-02 DIAGNOSIS — N18.30 CKD (CHRONIC KIDNEY DISEASE) STAGE 3, GFR 30-59 ML/MIN (CMD): ICD-10-CM

## 2019-07-02 DIAGNOSIS — Z98.890 S/P CARDIAC CATH: ICD-10-CM

## 2019-07-02 DIAGNOSIS — Q21.10 ASD (ATRIAL SEPTAL DEFECT): ICD-10-CM

## 2019-07-02 DIAGNOSIS — I26.99 PE (PULMONARY THROMBOEMBOLISM) (CMD): ICD-10-CM

## 2019-07-02 DIAGNOSIS — Z01.810 PREOP CARDIOVASCULAR EXAM: ICD-10-CM

## 2019-07-02 DIAGNOSIS — I26.99 PE (PULMONARY THROMBOEMBOLISM) (CMD): Primary | ICD-10-CM

## 2019-07-02 PROCEDURE — 99214 OFFICE O/P EST MOD 30 MIN: CPT | Performed by: INTERNAL MEDICINE

## 2019-07-02 PROCEDURE — 3074F SYST BP LT 130 MM HG: CPT | Performed by: INTERNAL MEDICINE

## 2019-07-02 PROCEDURE — 3078F DIAST BP <80 MM HG: CPT | Performed by: INTERNAL MEDICINE

## 2019-07-02 SDOH — HEALTH STABILITY: MENTAL HEALTH: HOW OFTEN DO YOU HAVE A DRINK CONTAINING ALCOHOL?: NEVER

## 2019-07-09 ENCOUNTER — WALK IN (OUTPATIENT)
Dept: URGENT CARE | Age: 64
End: 2019-07-09

## 2019-07-09 ENCOUNTER — ANCILLARY PROCEDURE (OUTPATIENT)
Dept: CARDIOLOGY | Age: 64
End: 2019-07-09
Attending: INTERNAL MEDICINE

## 2019-07-09 VITALS
SYSTOLIC BLOOD PRESSURE: 110 MMHG | OXYGEN SATURATION: 98 % | DIASTOLIC BLOOD PRESSURE: 82 MMHG | RESPIRATION RATE: 20 BRPM | TEMPERATURE: 99.3 F | HEART RATE: 84 BPM

## 2019-07-09 DIAGNOSIS — J02.9 SORE THROAT: ICD-10-CM

## 2019-07-09 DIAGNOSIS — G45.9 TIA (TRANSIENT ISCHEMIC ATTACK): ICD-10-CM

## 2019-07-09 DIAGNOSIS — J02.9 ACUTE PHARYNGITIS, UNSPECIFIED ETIOLOGY: Primary | ICD-10-CM

## 2019-07-09 LAB
INTERNAL PROCEDURAL CONTROLS ACCEPTABLE: YES
S PYO AG THROAT QL IA.RAPID: NEGATIVE

## 2019-07-09 PROCEDURE — 3079F DIAST BP 80-89 MM HG: CPT | Performed by: FAMILY MEDICINE

## 2019-07-09 PROCEDURE — 3074F SYST BP LT 130 MM HG: CPT | Performed by: FAMILY MEDICINE

## 2019-07-09 PROCEDURE — 93299 CARDIAC DEVICE HOME CHECK - REMOTE SCHEDULED: CPT | Performed by: INTERNAL MEDICINE

## 2019-07-09 PROCEDURE — 93298 REM INTERROG DEV EVAL SCRMS: CPT | Performed by: INTERNAL MEDICINE

## 2019-07-09 PROCEDURE — 87880 STREP A ASSAY W/OPTIC: CPT | Performed by: FAMILY MEDICINE

## 2019-07-09 PROCEDURE — 99214 OFFICE O/P EST MOD 30 MIN: CPT | Performed by: FAMILY MEDICINE

## 2019-07-09 RX ORDER — AZITHROMYCIN 250 MG/1
TABLET, FILM COATED ORAL
Qty: 6 TABLET | Refills: 0 | Status: SHIPPED | OUTPATIENT
Start: 2019-07-09 | End: 2019-07-14

## 2019-07-09 RX ORDER — PREDNISONE 20 MG/1
TABLET ORAL
Qty: 9 TABLET | Refills: 0 | Status: SHIPPED | OUTPATIENT
Start: 2019-07-09 | End: 2019-10-03 | Stop reason: ALTCHOICE

## 2019-07-10 ENCOUNTER — TELEPHONE (OUTPATIENT)
Dept: URGENT CARE | Age: 64
End: 2019-07-10

## 2019-07-18 ENCOUNTER — EXTERNAL RECORD (OUTPATIENT)
Dept: OTHER | Age: 64
End: 2019-07-18

## 2019-08-06 ENCOUNTER — TELEPHONE (OUTPATIENT)
Dept: CARDIOLOGY | Age: 64
End: 2019-08-06

## 2019-08-16 ENCOUNTER — E-ADVICE (OUTPATIENT)
Dept: CARDIOLOGY | Age: 64
End: 2019-08-16

## 2019-09-04 ENCOUNTER — E-ADVICE (OUTPATIENT)
Dept: CARDIOLOGY | Age: 64
End: 2019-09-04

## 2019-09-04 ENCOUNTER — ANCILLARY PROCEDURE (OUTPATIENT)
Dept: CARDIOLOGY | Age: 64
End: 2019-09-04
Attending: INTERNAL MEDICINE

## 2019-09-04 DIAGNOSIS — G45.9 TIA (TRANSIENT ISCHEMIC ATTACK): ICD-10-CM

## 2019-09-04 PROCEDURE — 93298 REM INTERROG DEV EVAL SCRMS: CPT | Performed by: INTERNAL MEDICINE

## 2019-09-04 PROCEDURE — 93299 CARDIAC DEVICE HOME CHECK - REMOTE SCHEDULED: CPT | Performed by: INTERNAL MEDICINE

## 2019-09-06 ENCOUNTER — OFFICE VISIT (OUTPATIENT)
Dept: NEPHROLOGY | Age: 64
End: 2019-09-06

## 2019-09-06 VITALS
SYSTOLIC BLOOD PRESSURE: 124 MMHG | HEART RATE: 92 BPM | WEIGHT: 146 LBS | BODY MASS INDEX: 23.57 KG/M2 | DIASTOLIC BLOOD PRESSURE: 80 MMHG

## 2019-09-06 DIAGNOSIS — N28.1 RENAL CYST: ICD-10-CM

## 2019-09-06 DIAGNOSIS — I12.9 NEPHROSCLEROSIS ARTERIOLAR, STAGE 1-4 OR UNSPECIFIED CHRONIC KIDNEY DISEASE: ICD-10-CM

## 2019-09-06 DIAGNOSIS — I10 ESSENTIAL HYPERTENSION: ICD-10-CM

## 2019-09-06 DIAGNOSIS — N20.0 KIDNEY STONE: ICD-10-CM

## 2019-09-06 DIAGNOSIS — N18.30 CKD (CHRONIC KIDNEY DISEASE) STAGE 3, GFR 30-59 ML/MIN (CMD): Primary | ICD-10-CM

## 2019-09-06 DIAGNOSIS — I70.1 RENAL ARTERY STENOSIS, NATIVE, BILATERAL (CMD): ICD-10-CM

## 2019-09-06 PROCEDURE — 3074F SYST BP LT 130 MM HG: CPT | Performed by: INTERNAL MEDICINE

## 2019-09-06 PROCEDURE — 3079F DIAST BP 80-89 MM HG: CPT | Performed by: INTERNAL MEDICINE

## 2019-09-06 PROCEDURE — 99214 OFFICE O/P EST MOD 30 MIN: CPT | Performed by: INTERNAL MEDICINE

## 2019-09-20 ENCOUNTER — LAB SERVICES (OUTPATIENT)
Dept: LAB | Age: 64
End: 2019-09-20

## 2019-09-20 ENCOUNTER — TELEPHONE (OUTPATIENT)
Dept: CARDIOLOGY | Age: 64
End: 2019-09-20

## 2019-09-20 ENCOUNTER — TELEPHONE (OUTPATIENT)
Dept: NEPHROLOGY | Age: 64
End: 2019-09-20

## 2019-09-20 DIAGNOSIS — I26.99 PE (PULMONARY THROMBOEMBOLISM) (CMD): ICD-10-CM

## 2019-09-20 DIAGNOSIS — N18.30 CKD (CHRONIC KIDNEY DISEASE) STAGE 3, GFR 30-59 ML/MIN (CMD): ICD-10-CM

## 2019-09-20 LAB
BUN SERPL-MCNC: 19 MG/DL (ref 7–20)
CALCIUM SERPL-MCNC: 10.2 MG/DL (ref 8.6–10.6)
CHLORIDE SERPL-SCNC: 112 MMOL/L (ref 96–107)
CO2 SERPL-SCNC: 18 MMOL/L (ref 22–32)
CREAT SERPL-MCNC: 1.2 MG/DL (ref 0.5–1.4)
GFR SERPL CREATININE-BSD FRML MDRD: 45 ML/MIN/{1.73M2}
GFR SERPL CREATININE-BSD FRML MDRD: 54 ML/MIN/{1.73M2}
GLUCOSE SERPL-MCNC: 90 MG/DL (ref 70–200)
POTASSIUM SERPL-SCNC: 4.8 MMOL/L (ref 3.5–5.3)
SODIUM SERPL-SCNC: 143 MMOL/L (ref 136–146)

## 2019-09-20 PROCEDURE — 80048 BASIC METABOLIC PNL TOTAL CA: CPT | Performed by: INTERNAL MEDICINE

## 2019-09-20 PROCEDURE — 36415 COLL VENOUS BLD VENIPUNCTURE: CPT | Performed by: INTERNAL MEDICINE

## 2019-09-24 ENCOUNTER — TELEPHONE (OUTPATIENT)
Dept: INTERNAL MEDICINE | Age: 64
End: 2019-09-24

## 2019-09-24 ENCOUNTER — APPOINTMENT (OUTPATIENT)
Dept: INTERNAL MEDICINE | Age: 64
End: 2019-09-24

## 2019-09-24 DIAGNOSIS — I26.99 PE (PULMONARY THROMBOEMBOLISM) (CMD): Primary | ICD-10-CM

## 2019-09-25 ENCOUNTER — TELEPHONE (OUTPATIENT)
Dept: MAMMOGRAPHY | Age: 64
End: 2019-09-25

## 2019-09-25 ENCOUNTER — IMAGING SERVICES (OUTPATIENT)
Dept: CT IMAGING | Age: 64
End: 2019-09-25

## 2019-09-25 DIAGNOSIS — I26.99 PE (PULMONARY THROMBOEMBOLISM) (CMD): ICD-10-CM

## 2019-09-26 ENCOUNTER — TELEPHONE (OUTPATIENT)
Dept: CARDIOLOGY | Age: 64
End: 2019-09-26

## 2019-10-01 ENCOUNTER — TELEPHONE (OUTPATIENT)
Dept: INTERNAL MEDICINE | Age: 64
End: 2019-10-01

## 2019-10-03 ENCOUNTER — OFFICE VISIT (OUTPATIENT)
Dept: INTERNAL MEDICINE | Age: 64
End: 2019-10-03

## 2019-10-03 ENCOUNTER — IMAGING SERVICES (OUTPATIENT)
Dept: GENERAL RADIOLOGY | Age: 64
End: 2019-10-03
Attending: FAMILY MEDICINE

## 2019-10-03 ENCOUNTER — APPOINTMENT (OUTPATIENT)
Dept: INTERNAL MEDICINE | Age: 64
End: 2019-10-03

## 2019-10-03 VITALS — OXYGEN SATURATION: 98 % | HEART RATE: 91 BPM | BODY MASS INDEX: 23.78 KG/M2 | WEIGHT: 148 LBS | HEIGHT: 66 IN

## 2019-10-03 DIAGNOSIS — Z72.0 TOBACCO USE: ICD-10-CM

## 2019-10-03 DIAGNOSIS — E78.5 HYPERLIPIDEMIA, UNSPECIFIED HYPERLIPIDEMIA TYPE: ICD-10-CM

## 2019-10-03 DIAGNOSIS — M16.12 PRIMARY OSTEOARTHRITIS OF LEFT HIP: ICD-10-CM

## 2019-10-03 DIAGNOSIS — Z01.818 PRE-OP EXAM: Primary | ICD-10-CM

## 2019-10-03 DIAGNOSIS — Z01.818 PRE-OP EXAM: ICD-10-CM

## 2019-10-03 DIAGNOSIS — N18.30 STAGE 3 CHRONIC KIDNEY DISEASE (CMD): ICD-10-CM

## 2019-10-03 DIAGNOSIS — I10 ESSENTIAL HYPERTENSION: ICD-10-CM

## 2019-10-03 PROCEDURE — 99214 OFFICE O/P EST MOD 30 MIN: CPT | Performed by: FAMILY MEDICINE

## 2019-10-03 PROCEDURE — 93000 ELECTROCARDIOGRAM COMPLETE: CPT | Performed by: FAMILY MEDICINE

## 2019-10-03 PROCEDURE — 71046 X-RAY EXAM CHEST 2 VIEWS: CPT | Performed by: RADIOLOGY

## 2019-10-03 RX ORDER — GARLIC EXTRACT 500 MG
1 CAPSULE ORAL
COMMUNITY
End: 2019-10-14 | Stop reason: SDUPTHER

## 2019-10-03 RX ORDER — ATORVASTATIN CALCIUM 40 MG/1
40 TABLET, FILM COATED ORAL
COMMUNITY
End: 2019-10-14 | Stop reason: SDUPTHER

## 2019-10-04 ENCOUNTER — LAB SERVICES (OUTPATIENT)
Dept: LAB | Age: 64
End: 2019-10-04

## 2019-10-04 DIAGNOSIS — I26.99 OTHER PULMONARY EMBOLISM WITHOUT ACUTE COR PULMONALE (CMD): ICD-10-CM

## 2019-10-04 DIAGNOSIS — Z01.818 PRE-OP EXAM: ICD-10-CM

## 2019-10-04 LAB
ALBUMIN SERPL-MCNC: 4.5 G/DL (ref 3.6–5.1)
ALP SERPL-CCNC: 101 U/L (ref 45–130)
ALT SERPL W/O P-5'-P-CCNC: 19 U/L (ref 4–38)
AST SERPL-CCNC: 23 U/L (ref 14–43)
BASOPHIL %: 0.5 % (ref 0–1.2)
BASOPHIL ABSOLUTE #: 0 10*3/UL (ref 0–0.1)
BILIRUB SERPL-MCNC: 0.2 MG/DL (ref 0–1.3)
BUN SERPL-MCNC: 24 MG/DL (ref 7–20)
CALCIUM SERPL-MCNC: 9.9 MG/DL (ref 8.6–10.6)
CHLORIDE SERPL-SCNC: 112 MMOL/L (ref 96–107)
CO2 SERPL-SCNC: 21 MMOL/L (ref 22–32)
CREAT SERPL-MCNC: 1.2 MG/DL (ref 0.5–1.4)
DIFFERENTIAL TYPE: ABNORMAL
EOSINOPHIL %: 2.2 % (ref 0–10)
EOSINOPHIL ABSOLUTE #: 0.2 10*3/UL (ref 0–0.5)
GFR SERPL CREATININE-BSD FRML MDRD: 45 ML/MIN/{1.73M2}
GFR SERPL CREATININE-BSD FRML MDRD: 54 ML/MIN/{1.73M2}
GLUCOSE P FAST SERPL-MCNC: 97 MG/DL (ref 60–100)
HEMATOCRIT: 39.6 % (ref 34–45)
HEMOGLOBIN: 11.8 G/DL (ref 11.2–15.7)
INTERNATIONAL NORMALIZED RATIO: 1
LYMPH PERCENT: 23.3 % (ref 20.5–51.1)
LYMPHOCYTE ABSOLUTE #: 2 10*3/UL (ref 1.2–3.4)
MEAN CORPUSCULAR HGB CONCENTRATION: 29.8 % (ref 32–36)
MEAN CORPUSCULAR HGB: 29.9 PG (ref 27–34)
MEAN CORPUSCULAR VOLUME: 100.3 FL (ref 79–95)
MEAN PLATELET VOLUME: 9.7 FL (ref 8.6–12.4)
MONOCYTE ABSOLUTE #: 0.7 10*3/UL (ref 0.2–0.9)
MONOCYTE PERCENT: 8.5 % (ref 4.3–12.9)
NEUTROPHIL ABSOLUTE #: 5.7 10*3/UL (ref 1.4–6.5)
NEUTROPHIL PERCENT: 65.5 % (ref 34–73.5)
PLATELET COUNT: 432 10*3/UL (ref 150–400)
POTASSIUM SERPL-SCNC: 4.9 MMOL/L (ref 3.5–5.3)
PROT SERPL-MCNC: 7.2 G/DL (ref 6.4–8.5)
PROTHROMBIN TIME, THERAPEUTIC: 10.2 S (ref 9.5–11.5)
PTT: 27 S (ref 24–38)
RED BLOOD CELL COUNT: 3.95 10*6/UL (ref 3.7–5.2)
RED CELL DISTRIBUTION WIDTH: 15.5 % (ref 11.3–14.8)
SODIUM SERPL-SCNC: 143 MMOL/L (ref 136–146)
WHITE BLOOD CELL COUNT: 8.6 10*3/UL (ref 4–10)

## 2019-10-04 PROCEDURE — 85610 PROTHROMBIN TIME: CPT | Performed by: FAMILY MEDICINE

## 2019-10-04 PROCEDURE — 36415 COLL VENOUS BLD VENIPUNCTURE: CPT | Performed by: FAMILY MEDICINE

## 2019-10-04 PROCEDURE — 85730 THROMBOPLASTIN TIME PARTIAL: CPT | Performed by: FAMILY MEDICINE

## 2019-10-04 PROCEDURE — 80053 COMPREHEN METABOLIC PANEL: CPT | Performed by: FAMILY MEDICINE

## 2019-10-04 PROCEDURE — 85025 COMPLETE CBC W/AUTO DIFF WBC: CPT | Performed by: FAMILY MEDICINE

## 2019-10-04 ASSESSMENT — ENCOUNTER SYMPTOMS
HEMATOLOGIC/LYMPHATIC NEGATIVE: 1
ENDOCRINE NEGATIVE: 1
PSYCHIATRIC NEGATIVE: 1
EYES NEGATIVE: 1
ALLERGIC/IMMUNOLOGIC NEGATIVE: 1
CONSTITUTIONAL NEGATIVE: 1
GASTROINTESTINAL NEGATIVE: 1

## 2019-10-11 ENCOUNTER — ANCILLARY PROCEDURE (OUTPATIENT)
Dept: CARDIOLOGY | Age: 64
End: 2019-10-11
Attending: INTERNAL MEDICINE

## 2019-10-11 DIAGNOSIS — G45.9 TIA (TRANSIENT ISCHEMIC ATTACK): ICD-10-CM

## 2019-10-11 PROCEDURE — 93298 REM INTERROG DEV EVAL SCRMS: CPT | Performed by: INTERNAL MEDICINE

## 2019-10-11 PROCEDURE — 93299 CHECK IMPLANT CARDIO OR SUBQ RHYTHM MNTR REMOTE UP TO 30 DAY TECH EVAL: CPT | Performed by: INTERNAL MEDICINE

## 2019-10-14 ENCOUNTER — OFFICE VISIT (OUTPATIENT)
Dept: CARDIOLOGY | Age: 64
End: 2019-10-14

## 2019-10-14 ENCOUNTER — TELEPHONE (OUTPATIENT)
Dept: CARDIOLOGY | Age: 64
End: 2019-10-14

## 2019-10-14 VITALS
BODY MASS INDEX: 24.66 KG/M2 | HEIGHT: 65 IN | RESPIRATION RATE: 16 BRPM | HEART RATE: 87 BPM | OXYGEN SATURATION: 97 % | DIASTOLIC BLOOD PRESSURE: 84 MMHG | SYSTOLIC BLOOD PRESSURE: 126 MMHG | WEIGHT: 148 LBS

## 2019-10-14 DIAGNOSIS — G45.9 TIA (TRANSIENT ISCHEMIC ATTACK): ICD-10-CM

## 2019-10-14 DIAGNOSIS — I71.40 ABDOMINAL AORTIC ANEURYSM WITHOUT RUPTURE (CMD): ICD-10-CM

## 2019-10-14 DIAGNOSIS — I10 ESSENTIAL HYPERTENSION: ICD-10-CM

## 2019-10-14 DIAGNOSIS — I26.99 PE (PULMONARY THROMBOEMBOLISM) (CMD): ICD-10-CM

## 2019-10-14 DIAGNOSIS — Z01.810 PREOP CARDIOVASCULAR EXAM: Primary | ICD-10-CM

## 2019-10-14 DIAGNOSIS — I25.119 CORONARY ARTERY DISEASE INVOLVING NATIVE CORONARY ARTERY OF NATIVE HEART WITH ANGINA PECTORIS (CMD): ICD-10-CM

## 2019-10-14 DIAGNOSIS — N18.30 CKD (CHRONIC KIDNEY DISEASE) STAGE 3, GFR 30-59 ML/MIN (CMD): ICD-10-CM

## 2019-10-14 DIAGNOSIS — I71.40 ABDOMINAL AORTIC ANEURYSM WITHOUT RUPTURE (CMD): Primary | ICD-10-CM

## 2019-10-14 DIAGNOSIS — Q21.10 ASD (ATRIAL SEPTAL DEFECT): ICD-10-CM

## 2019-10-14 DIAGNOSIS — E78.00 PURE HYPERCHOLESTEROLEMIA: ICD-10-CM

## 2019-10-14 PROCEDURE — 99214 OFFICE O/P EST MOD 30 MIN: CPT | Performed by: INTERNAL MEDICINE

## 2019-10-14 PROCEDURE — 3079F DIAST BP 80-89 MM HG: CPT | Performed by: INTERNAL MEDICINE

## 2019-10-14 PROCEDURE — 3074F SYST BP LT 130 MM HG: CPT | Performed by: INTERNAL MEDICINE

## 2019-10-14 RX ORDER — APIXABAN 5 MG/1
TABLET, FILM COATED ORAL
Qty: 180 TABLET | Refills: 1 | Status: SHIPPED | OUTPATIENT
Start: 2019-10-14 | End: 2020-02-05 | Stop reason: ALTCHOICE

## 2019-10-17 ENCOUNTER — TELEPHONE (OUTPATIENT)
Dept: INTERNAL MEDICINE | Age: 64
End: 2019-10-17

## 2019-10-17 ENCOUNTER — WALK IN (OUTPATIENT)
Dept: URGENT CARE | Age: 64
End: 2019-10-17

## 2019-10-17 DIAGNOSIS — R11.2 NAUSEA AND VOMITING, INTRACTABILITY OF VOMITING NOT SPECIFIED, UNSPECIFIED VOMITING TYPE: ICD-10-CM

## 2019-10-17 DIAGNOSIS — R50.9 FEVER IN ADULT: ICD-10-CM

## 2019-10-17 DIAGNOSIS — R11.2 NAUSEA WITH VOMITING, UNSPECIFIED: ICD-10-CM

## 2019-10-17 DIAGNOSIS — N39.0 URINARY TRACT INFECTION WITHOUT HEMATURIA, SITE UNSPECIFIED: Primary | ICD-10-CM

## 2019-10-17 DIAGNOSIS — E86.0 DEHYDRATION: ICD-10-CM

## 2019-10-17 LAB
ALBUMIN SERPL-MCNC: 4.8 G/DL (ref 3.6–5.1)
ALP SERPL-CCNC: 97 U/L (ref 45–130)
ALT SERPL W/O P-5'-P-CCNC: 28 U/L (ref 4–38)
AST SERPL-CCNC: 38 U/L (ref 14–43)
BACTERIA: ABNORMAL
BILIRUB SERPL-MCNC: 0.5 MG/DL (ref 0–1.3)
BILIRUBIN URINE: NEGATIVE
BLOOD URINE: ABNORMAL
BUN SERPL-MCNC: 24 MG/DL (ref 7–20)
CALCIUM SERPL-MCNC: 9.9 MG/DL (ref 8.6–10.6)
CHLORIDE SERPL-SCNC: 115 MMOL/L (ref 96–107)
CLARITY: ABNORMAL
CO2 SERPL-SCNC: 16 MMOL/L (ref 22–32)
COLOR: YELLOW
CREAT SERPL-MCNC: 1.3 MG/DL (ref 0.5–1.4)
DIFFERENTIAL TYPE: ABNORMAL
GFR SERPL CREATININE-BSD FRML MDRD: 41 ML/MIN/{1.73M2}
GFR SERPL CREATININE-BSD FRML MDRD: 50 ML/MIN/{1.73M2}
GLUCOSE QUALITATIVE U: NEGATIVE
GLUCOSE SERPL-MCNC: 134 MG/DL (ref 70–200)
HEMATOCRIT: 39.1 % (ref 34–45)
HEMOGLOBIN: 12.1 G/DL (ref 11.2–15.7)
KETONES, URINE: NEGATIVE
LEUKOCYTE ESTERASE URINE: ABNORMAL
LYMPH PERCENT MD: 2 % (ref 20.5–51.1)
LYMPHOCYTE ABSOLUTE # MD: 0.4 /UL (ref 1.2–3.4)
MEAN CORPUSCULAR HGB CONCENTRATION: 30.9 % (ref 32–36)
MEAN CORPUSCULAR HGB: 30.1 PG (ref 27–34)
MEAN CORPUSCULAR VOLUME: 97.3 FL (ref 79–95)
MEAN PLATELET VOLUME: 9.3 FL (ref 8.6–12.4)
MONOCYTE ABSOLUTE # MD: 2 /UL (ref 0.2–0.9)
MONOCYTE PERCENT MD: 9 % (ref 4.3–12.9)
MUCOUS: ABNORMAL
NEUTROPHIL ABSOLUTE # MD: 19.6 /UL (ref 1.4–6.5)
NEUTROPHIL PERCENT MD: 89 % (ref 34–73.5)
NITRITE URINE: NEGATIVE
PH URINE: 5 (ref 5–7)
PLATELET COUNT: 371 10*3/UL (ref 150–400)
POTASSIUM SERPL-SCNC: 4.3 MMOL/L (ref 3.5–5.3)
PROT SERPL-MCNC: 7.8 G/DL (ref 6.4–8.5)
RBC & PLT MORPHOLOGY: NORMAL
RED BLOOD CELL COUNT: 4.02 10*6/UL (ref 3.7–5.2)
RED BLOOD CELLS URINE: ABNORMAL (ref 0–3)
RED CELL DISTRIBUTION WIDTH: 15.4 % (ref 11.3–14.8)
SODIUM SERPL-SCNC: 143 MMOL/L (ref 136–146)
SPECIFIC GRAVITY URINE: 1.02 (ref 1–1.03)
SQUAMOUS EPITHELIAL CELLS: ABNORMAL
URINE PROTEIN, QUAL (DIPSTICK): 100
UROBILINOGEN URINE: <2
WBC CLUMPS: ABNORMAL
WHITE BLOOD CELL COUNT: 22 10*3/UL (ref 4–10)
WHITE BLOOD CELLS URINE: >100 (ref 0–5)

## 2019-10-17 PROCEDURE — 85027 COMPLETE CBC AUTOMATED: CPT | Performed by: FAMILY MEDICINE

## 2019-10-17 PROCEDURE — 96360 HYDRATION IV INFUSION INIT: CPT

## 2019-10-17 PROCEDURE — 3078F DIAST BP <80 MM HG: CPT | Performed by: FAMILY MEDICINE

## 2019-10-17 PROCEDURE — 3074F SYST BP LT 130 MM HG: CPT | Performed by: FAMILY MEDICINE

## 2019-10-17 PROCEDURE — 36415 COLL VENOUS BLD VENIPUNCTURE: CPT | Performed by: FAMILY MEDICINE

## 2019-10-17 PROCEDURE — 81003 URINALYSIS AUTO W/O SCOPE: CPT | Performed by: FAMILY MEDICINE

## 2019-10-17 PROCEDURE — 99215 OFFICE O/P EST HI 40 MIN: CPT | Performed by: FAMILY MEDICINE

## 2019-10-17 PROCEDURE — 85007 BL SMEAR W/DIFF WBC COUNT: CPT | Performed by: FAMILY MEDICINE

## 2019-10-17 PROCEDURE — 80053 COMPREHEN METABOLIC PANEL: CPT | Performed by: FAMILY MEDICINE

## 2019-10-17 RX ORDER — ONDANSETRON 8 MG/1
8 TABLET, ORALLY DISINTEGRATING ORAL ONCE
Status: COMPLETED | OUTPATIENT
Start: 2019-10-17 | End: 2019-10-17

## 2019-10-17 RX ORDER — CEFUROXIME AXETIL 500 MG/1
500 TABLET ORAL 2 TIMES DAILY
Qty: 20 TABLET | Refills: 0 | Status: SHIPPED | OUTPATIENT
Start: 2019-10-17 | End: 2019-10-27

## 2019-10-17 RX ORDER — ONDANSETRON 8 MG/1
8 TABLET, ORALLY DISINTEGRATING ORAL EVERY 8 HOURS PRN
Qty: 10 TABLET | Refills: 0 | Status: SHIPPED | OUTPATIENT
Start: 2019-10-17 | End: 2019-10-22

## 2019-10-17 RX ADMIN — ONDANSETRON 8 MG: 8 TABLET, ORALLY DISINTEGRATING ORAL at 15:32

## 2019-10-17 SDOH — HEALTH STABILITY: MENTAL HEALTH: HOW OFTEN DO YOU HAVE A DRINK CONTAINING ALCOHOL?: NEVER

## 2019-10-17 ASSESSMENT — PAIN SCALES - GENERAL: PAINLEVEL: 7-8

## 2019-10-19 LAB
*MEAN CORPUSCULAR HGB CONC: 32.6 G/DL (ref 32.5–35.8)
A/G RATIO: 1.19 (ref 1.1–2.4)
ALANINE AMINOTRANSFE: 29 U/L (ref 7–52)
ALBUMIN, SERUM (ALB): 3.7 G/DL (ref 3.5–5.7)
ALKALINE PHOSPHATASE (ALK): 104 U/L (ref 34–104)
AMORPHOUS: ABNORMAL /HPF
ANION GAP: 10 MEQ/L (ref 8–16)
APPEARANCE: CLEAR
ASPARTATE AMINOTRANS: 28 U/L (ref 13–39)
BACTERIA: ABNORMAL /HPF
BASOPHIL AUTOMATED: 0.4 %
BASOPHILS: 0 (ref 0–0.2)
BEDSIDE VENOUS BASE EXCESS: -10 MMOL/L (ref -2–3)
BEDSIDE VENOUS HCO3: 16 MMOL/L (ref 23–28)
BEDSIDE VENOUS LACTATE: 0.79 MMOL/L (ref 0.9–2)
BEDSIDE VENOUS O2 SATURATION: 44 %
BEDSIDE VENOUS PCO2: 34 MMHG (ref 41–51)
BEDSIDE VENOUS PH: 7.29 (ref 7.31–7.41)
BEDSIDE VENOUS PO2: 27 MMHG
BEDSIDE VENOUS TCO2: 17 MMOL/L (ref 24–29)
BILIRUBIN, TOTAL: 0.4 MG/DL (ref 0.2–0.8)
BILIRUBIN: ABNORMAL
BLOOD UREA NITROGEN (BUN): 27 MG/DL (ref 7–25)
BUN/CREATININE RATIO: 16.5 (ref 7.4–23)
CALCIUM, SERUM: 9.4 MG/DL (ref 8.6–10.3)
CARBON DIOXIDE: 19 MMOL/L (ref 21–31)
CHLORIDE, SERUM: 108 MMOL/L (ref 98–107)
COLOR: YELLOW
CREATININE: 1.64 MG/DL (ref 0.6–1.2)
CULTURE REFLEX COMMENT: YES
EOSINOPHIL AUTOMATED: 1.8 %
EOSINOPHILS: 0.2 (ref 0–0.5)
EST GLOMERULAR FILTRATION RATE: 31 1.73M SQ
FECAL LEUKOCYTE SMEAR: NORMAL
GLUCOSE, URINE, AUTOMATED: ABNORMAL MG/DL
GLUCOSE: 94 MG/DL (ref 70–99)
GRANULAR CAST: ABNORMAL /LPF
HEMATOCRIT: 34.5 % (ref 34.7–45.1)
HEMOGLOBIN: 11.2 GM/DL (ref 12–15.3)
HYALINE CAST: ABNORMAL /LPF
K (POTASSIUM, SERUM): 3.5 MMOL/L (ref 3.5–5.1)
KETONES, URINE, AUTOMATED: 5 MG/DL
LEUKOCYTE, URINE, AUTOMATED: ABNORMAL
LYMPHOCYTE AUTOMATED: 7.2 %
LYMPHOCYTES: 0.6 (ref 0.6–3.4)
MEAN CORPUSCULAR HGB: 30.9 PG (ref 26–34)
MEAN CORPUSCULAR VOL: 95 FL (ref 80–100)
MEAN PLATELET VOLUME: 7.7 FL (ref 6.8–10.2)
MONOCYTE AUTOMATED: 9.4 %
MONOCYTES: 0.8 (ref 0.3–1)
NA (SODIUM, SERUM): 137 MMOL/L (ref 133–144)
NEUTROPHIL ABSOLUTE: 7.3 (ref 1.7–7.7)
NEUTROPHIL AUTOMATED: 81.2 %
NITRITE, URINE AUTOMATED: NEGATIVE
PH, URINE: 5 (ref 5–8)
PLATELET COUNT: 261 K/MM3 (ref 150–450)
PROTEIN TOTAL: 6.8 G/DL (ref 6.4–8.9)
PROTEIN, URINE: 100 MG/DL
RBC: ABNORMAL /HPF (ref 0–2)
RED BLOOD CELL COUNT: 3.63 M/MM3 (ref 3.63–5.04)
RED CELL DISTRIBUTIO: 16.2 % (ref 11.9–15.9)
SPECIFIC GRAVITY UA: 1.02 (ref 1–1.03)
SQUAMOUS EPITHELIAL: ABNORMAL /LPF
URINE, BLOOD, AUTOMATED: ABNORMAL
UROBILINOGEN, URINE, AUTOMATED: ABNORMAL MG/DL
WBC: ABNORMAL /HPF (ref 0–5)
WHITE BLOOD CELL COU: 8.9 K/MM3 (ref 4–11)

## 2019-10-19 PROCEDURE — 93010 ELECTROCARDIOGRAM REPORT: CPT | Performed by: INTERNAL MEDICINE

## 2019-10-20 LAB — URINE CULTURE: NORMAL

## 2019-10-21 ENCOUNTER — TELEPHONE (OUTPATIENT)
Dept: URGENT CARE | Age: 64
End: 2019-10-21

## 2019-10-21 ENCOUNTER — TELEPHONE (OUTPATIENT)
Dept: NEPHROLOGY | Age: 64
End: 2019-10-21

## 2019-10-21 ENCOUNTER — TELEPHONE (OUTPATIENT)
Dept: INTERNAL MEDICINE | Age: 64
End: 2019-10-21

## 2019-10-21 DIAGNOSIS — N17.9 ACUTE RENAL FAILURE, UNSPECIFIED ACUTE RENAL FAILURE TYPE (CMD): Primary | ICD-10-CM

## 2019-10-22 LAB — STOOL CULTURE: NORMAL

## 2019-10-24 ENCOUNTER — OFFICE VISIT (OUTPATIENT)
Dept: NEPHROLOGY | Age: 64
End: 2019-10-24

## 2019-10-24 ENCOUNTER — LAB SERVICES (OUTPATIENT)
Dept: LAB | Age: 64
End: 2019-10-24

## 2019-10-24 VITALS
BODY MASS INDEX: 23.99 KG/M2 | HEIGHT: 65 IN | SYSTOLIC BLOOD PRESSURE: 106 MMHG | WEIGHT: 144 LBS | DIASTOLIC BLOOD PRESSURE: 66 MMHG | HEART RATE: 90 BPM

## 2019-10-24 DIAGNOSIS — I12.9 NEPHROSCLEROSIS ARTERIOLAR, STAGE 1-4 OR UNSPECIFIED CHRONIC KIDNEY DISEASE: ICD-10-CM

## 2019-10-24 DIAGNOSIS — N20.0 KIDNEY STONE: ICD-10-CM

## 2019-10-24 DIAGNOSIS — N17.9 ACUTE RENAL FAILURE, UNSPECIFIED ACUTE RENAL FAILURE TYPE (CMD): ICD-10-CM

## 2019-10-24 DIAGNOSIS — I10 ESSENTIAL HYPERTENSION: ICD-10-CM

## 2019-10-24 DIAGNOSIS — N17.9 ACUTE RENAL FAILURE, UNSPECIFIED ACUTE RENAL FAILURE TYPE (CMD): Primary | ICD-10-CM

## 2019-10-24 DIAGNOSIS — N18.30 CKD (CHRONIC KIDNEY DISEASE) STAGE 3, GFR 30-59 ML/MIN (CMD): ICD-10-CM

## 2019-10-24 LAB
BLOOD CULTURE: NORMAL
BLOOD CULTURE: NORMAL
BUN SERPL-MCNC: 13 MG/DL (ref 7–20)
CALCIUM SERPL-MCNC: 10 MG/DL (ref 8.6–10.6)
CHLORIDE SERPL-SCNC: 112 MMOL/L (ref 96–107)
CO2 SERPL-SCNC: 20 MMOL/L (ref 22–32)
CREAT SERPL-MCNC: 1.2 MG/DL (ref 0.5–1.4)
GFR SERPL CREATININE-BSD FRML MDRD: 45 ML/MIN/{1.73M2}
GFR SERPL CREATININE-BSD FRML MDRD: 54 ML/MIN/{1.73M2}
GLUCOSE SERPL-MCNC: 92 MG/DL (ref 70–200)
POTASSIUM SERPL-SCNC: 4.4 MMOL/L (ref 3.5–5.3)
SODIUM SERPL-SCNC: 143 MMOL/L (ref 136–146)

## 2019-10-24 PROCEDURE — 80048 BASIC METABOLIC PNL TOTAL CA: CPT | Performed by: INTERNAL MEDICINE

## 2019-10-24 PROCEDURE — 3078F DIAST BP <80 MM HG: CPT | Performed by: INTERNAL MEDICINE

## 2019-10-24 PROCEDURE — 99214 OFFICE O/P EST MOD 30 MIN: CPT | Performed by: INTERNAL MEDICINE

## 2019-10-24 PROCEDURE — 3074F SYST BP LT 130 MM HG: CPT | Performed by: INTERNAL MEDICINE

## 2019-10-24 PROCEDURE — 36415 COLL VENOUS BLD VENIPUNCTURE: CPT | Performed by: INTERNAL MEDICINE

## 2019-10-24 RX ORDER — CHOLESTYRAMINE 4 G/9G
1 POWDER, FOR SUSPENSION ORAL
Qty: 60 EACH | Refills: 1 | Status: SHIPPED | OUTPATIENT
Start: 2019-10-24 | End: 2019-11-07 | Stop reason: ALTCHOICE

## 2019-10-28 ENCOUNTER — LAB SERVICES (OUTPATIENT)
Dept: LAB | Age: 64
End: 2019-10-28

## 2019-10-28 ENCOUNTER — OFFICE VISIT (OUTPATIENT)
Dept: INTERNAL MEDICINE | Age: 64
End: 2019-10-28

## 2019-10-28 VITALS
HEART RATE: 100 BPM | WEIGHT: 143 LBS | HEIGHT: 65 IN | OXYGEN SATURATION: 97 % | DIASTOLIC BLOOD PRESSURE: 80 MMHG | BODY MASS INDEX: 23.82 KG/M2 | TEMPERATURE: 98.9 F | SYSTOLIC BLOOD PRESSURE: 142 MMHG

## 2019-10-28 DIAGNOSIS — N17.9 ACUTE KIDNEY INJURY SUPERIMPOSED ON CKD (CMD): ICD-10-CM

## 2019-10-28 DIAGNOSIS — N18.30 CKD (CHRONIC KIDNEY DISEASE) STAGE 3, GFR 30-59 ML/MIN (CMD): ICD-10-CM

## 2019-10-28 DIAGNOSIS — N39.0 RECURRENT UTI: ICD-10-CM

## 2019-10-28 DIAGNOSIS — R19.7 DIARRHEA, UNSPECIFIED TYPE: Primary | ICD-10-CM

## 2019-10-28 DIAGNOSIS — N17.9 ACUTE RENAL FAILURE, UNSPECIFIED ACUTE RENAL FAILURE TYPE (CMD): Primary | ICD-10-CM

## 2019-10-28 DIAGNOSIS — N18.9 ACUTE KIDNEY INJURY SUPERIMPOSED ON CKD (CMD): ICD-10-CM

## 2019-10-28 DIAGNOSIS — N17.9 ACUTE RENAL FAILURE, UNSPECIFIED ACUTE RENAL FAILURE TYPE (CMD): ICD-10-CM

## 2019-10-28 LAB
ALBUMIN SERPL-MCNC: 3.9 G/DL (ref 3.6–5.1)
ALP SERPL-CCNC: 118 U/L (ref 45–130)
ALT SERPL W/O P-5'-P-CCNC: 13 U/L (ref 4–38)
AST SERPL-CCNC: 19 U/L (ref 14–43)
BILIRUB SERPL-MCNC: 0.2 MG/DL (ref 0–1.3)
BILIRUBIN URINE: NEGATIVE
BLOOD URINE: ABNORMAL
BUN SERPL-MCNC: 12 MG/DL (ref 7–20)
CALCIUM SERPL-MCNC: 9.9 MG/DL (ref 8.6–10.6)
CHLORIDE SERPL-SCNC: 112 MMOL/L (ref 96–107)
CLARITY: ABNORMAL
CO2 SERPL-SCNC: 17 MMOL/L (ref 22–32)
COLOR: YELLOW
CREAT SERPL-MCNC: 1 MG/DL (ref 0.5–1.4)
GFR SERPL CREATININE-BSD FRML MDRD: 55 ML/MIN/{1.73M2}
GFR SERPL CREATININE-BSD FRML MDRD: >60 ML/MIN/{1.73M2}
GLUCOSE QUALITATIVE U: NEGATIVE
GLUCOSE SERPL-MCNC: 92 MG/DL (ref 70–200)
HEMATOCRIT: 36.6 % (ref 34–45)
HEMOGLOBIN: 11.3 G/DL (ref 11.2–15.7)
KETONES, URINE: NEGATIVE
LEUKOCYTE ESTERASE URINE: ABNORMAL
MUCOUS: ABNORMAL
NITRITE URINE: NEGATIVE
PH URINE: 5 (ref 5–7)
POTASSIUM SERPL-SCNC: 4.2 MMOL/L (ref 3.5–5.3)
PROT SERPL-MCNC: 7.2 G/DL (ref 6.4–8.5)
RED BLOOD CELLS URINE: ABNORMAL (ref 0–3)
SODIUM SERPL-SCNC: 143 MMOL/L (ref 136–146)
SPECIFIC GRAVITY URINE: 1.03 (ref 1–1.03)
SQUAMOUS EPITHELIAL CELLS: ABNORMAL
URINE PROTEIN, QUAL (DIPSTICK): 30
UROBILINOGEN URINE: <2
WHITE BLOOD CELLS URINE: ABNORMAL (ref 0–5)

## 2019-10-28 PROCEDURE — 85014 HEMATOCRIT: CPT | Performed by: INTERNAL MEDICINE

## 2019-10-28 PROCEDURE — 99214 OFFICE O/P EST MOD 30 MIN: CPT | Performed by: FAMILY MEDICINE

## 2019-10-28 PROCEDURE — 80053 COMPREHEN METABOLIC PANEL: CPT | Performed by: INTERNAL MEDICINE

## 2019-10-28 PROCEDURE — 87186 SC STD MICRODIL/AGAR DIL: CPT | Performed by: INTERNAL MEDICINE

## 2019-10-28 PROCEDURE — 36415 COLL VENOUS BLD VENIPUNCTURE: CPT | Performed by: INTERNAL MEDICINE

## 2019-10-28 PROCEDURE — 3079F DIAST BP 80-89 MM HG: CPT | Performed by: FAMILY MEDICINE

## 2019-10-28 PROCEDURE — 81003 URINALYSIS AUTO W/O SCOPE: CPT | Performed by: INTERNAL MEDICINE

## 2019-10-28 PROCEDURE — 3077F SYST BP >= 140 MM HG: CPT | Performed by: FAMILY MEDICINE

## 2019-10-28 PROCEDURE — 85018 HEMOGLOBIN: CPT | Performed by: INTERNAL MEDICINE

## 2019-10-28 PROCEDURE — 87088 URINE BACTERIA CULTURE: CPT | Performed by: INTERNAL MEDICINE

## 2019-10-28 PROCEDURE — 87086 URINE CULTURE/COLONY COUNT: CPT | Performed by: INTERNAL MEDICINE

## 2019-10-29 ENCOUNTER — LAB SERVICES (OUTPATIENT)
Dept: LAB | Age: 64
End: 2019-10-29

## 2019-10-29 ENCOUNTER — TELEPHONE (OUTPATIENT)
Dept: NEPHROLOGY | Age: 64
End: 2019-10-29

## 2019-10-29 DIAGNOSIS — R19.7 DIARRHEA, UNSPECIFIED TYPE: ICD-10-CM

## 2019-10-29 DIAGNOSIS — N18.30 CHRONIC KIDNEY DISEASE, STAGE III (MODERATE) (CMD): Primary | ICD-10-CM

## 2019-10-29 DIAGNOSIS — E87.20 METABOLIC ACIDOSIS: ICD-10-CM

## 2019-10-29 LAB — HEMOCCULT STL QL IA: POSITIVE

## 2019-10-29 PROCEDURE — G0328 FECAL BLOOD SCRN IMMUNOASSAY: HCPCS | Performed by: FAMILY MEDICINE

## 2019-10-30 ENCOUNTER — TELEPHONE (OUTPATIENT)
Dept: NEPHROLOGY | Age: 64
End: 2019-10-30

## 2019-10-30 ENCOUNTER — LAB SERVICES (OUTPATIENT)
Dept: LAB | Age: 64
End: 2019-10-30

## 2019-10-30 ENCOUNTER — TELEPHONE (OUTPATIENT)
Dept: INTERNAL MEDICINE | Age: 64
End: 2019-10-30

## 2019-10-30 DIAGNOSIS — K92.1 BLOOD IN THE STOOL: Primary | ICD-10-CM

## 2019-10-30 DIAGNOSIS — R19.7 DIARRHEA, UNSPECIFIED TYPE: ICD-10-CM

## 2019-10-30 LAB
FINAL REPORT: ABNORMAL
HEMOCCULT STL QL IA: POSITIVE

## 2019-10-30 PROCEDURE — G0328 FECAL BLOOD SCRN IMMUNOASSAY: HCPCS | Performed by: FAMILY MEDICINE

## 2019-10-31 ENCOUNTER — TELEPHONE (OUTPATIENT)
Dept: INTERNAL MEDICINE | Age: 64
End: 2019-10-31

## 2019-10-31 ENCOUNTER — LAB SERVICES (OUTPATIENT)
Dept: LAB | Age: 64
End: 2019-10-31

## 2019-10-31 DIAGNOSIS — K92.1 BLOODY STOOL: ICD-10-CM

## 2019-10-31 DIAGNOSIS — R19.7 DIARRHEA, UNSPECIFIED TYPE: Primary | ICD-10-CM

## 2019-11-01 ENCOUNTER — LAB SERVICES (OUTPATIENT)
Dept: LAB | Age: 64
End: 2019-11-01

## 2019-11-01 ENCOUNTER — TELEPHONE (OUTPATIENT)
Dept: GASTROENTEROLOGY | Age: 64
End: 2019-11-01

## 2019-11-01 DIAGNOSIS — R19.7 DIARRHEA, UNSPECIFIED TYPE: ICD-10-CM

## 2019-11-01 LAB — C DIFF TOX A+B STL QL IA: NEGATIVE

## 2019-11-01 PROCEDURE — 87493 C DIFF AMPLIFIED PROBE: CPT | Performed by: FAMILY MEDICINE

## 2019-11-05 ENCOUNTER — IMAGING SERVICES (OUTPATIENT)
Dept: CT IMAGING | Age: 64
End: 2019-11-05

## 2019-11-07 ENCOUNTER — TELEPHONE (OUTPATIENT)
Dept: GASTROENTEROLOGY | Age: 64
End: 2019-11-07

## 2019-11-07 ENCOUNTER — OFFICE VISIT (OUTPATIENT)
Dept: GASTROENTEROLOGY | Age: 64
End: 2019-11-07

## 2019-11-07 ENCOUNTER — IMAGING SERVICES (OUTPATIENT)
Dept: CT IMAGING | Age: 64
End: 2019-11-07
Attending: FAMILY MEDICINE

## 2019-11-07 VITALS
HEIGHT: 65 IN | DIASTOLIC BLOOD PRESSURE: 78 MMHG | BODY MASS INDEX: 22.99 KG/M2 | SYSTOLIC BLOOD PRESSURE: 118 MMHG | WEIGHT: 138 LBS

## 2019-11-07 DIAGNOSIS — R19.7 DIARRHEA, UNSPECIFIED TYPE: ICD-10-CM

## 2019-11-07 DIAGNOSIS — R19.5 HEME POSITIVE STOOL: Primary | ICD-10-CM

## 2019-11-07 PROCEDURE — 99204 OFFICE O/P NEW MOD 45 MIN: CPT | Performed by: INTERNAL MEDICINE

## 2019-11-07 PROCEDURE — 74176 CT ABD & PELVIS W/O CONTRAST: CPT | Performed by: RADIOLOGY

## 2019-11-07 PROCEDURE — 3078F DIAST BP <80 MM HG: CPT | Performed by: INTERNAL MEDICINE

## 2019-11-07 PROCEDURE — 3074F SYST BP LT 130 MM HG: CPT | Performed by: INTERNAL MEDICINE

## 2019-11-07 RX ORDER — BISACODYL 5 MG/1
TABLET, DELAYED RELEASE ORAL
Qty: 2 TABLET | Refills: 0 | Status: SHIPPED | OUTPATIENT
Start: 2019-11-07 | End: 2019-12-22

## 2019-11-07 RX ORDER — SIMETHICONE 125 MG
TABLET,CHEWABLE ORAL
Qty: 2 TABLET | Refills: 0 | Status: SHIPPED | OUTPATIENT
Start: 2019-11-07 | End: 2019-12-22

## 2019-11-07 SDOH — HEALTH STABILITY: MENTAL HEALTH: HOW OFTEN DO YOU HAVE A DRINK CONTAINING ALCOHOL?: NEVER

## 2019-11-08 ENCOUNTER — TELEPHONE (OUTPATIENT)
Dept: INTERNAL MEDICINE | Age: 64
End: 2019-11-08

## 2019-11-18 ENCOUNTER — TELEPHONE (OUTPATIENT)
Dept: GASTROENTEROLOGY | Age: 64
End: 2019-11-18

## 2019-11-19 ENCOUNTER — TELEPHONE (OUTPATIENT)
Dept: INTERNAL MEDICINE | Age: 64
End: 2019-11-19

## 2019-11-19 LAB
*MEAN CORPUSCULAR HGB CONC: 32.4 G/DL (ref 32.5–35.8)
A/G RATIO: 1.08 (ref 1.1–2.4)
ALANINE AMINOTRANSFE: 19 U/L (ref 7–52)
ALBUMIN, SERUM (ALB): 4 G/DL (ref 3.5–5.7)
ALKALINE PHOSPHATASE (ALK): 167 U/L (ref 34–104)
ANION GAP: 14 MEQ/L (ref 8–16)
APPEARANCE: ABNORMAL
ASPARTATE AMINOTRANS: 22 U/L (ref 13–39)
BACTERIA: ABNORMAL /HPF
BASOPHIL AUTOMATED: 0.4 %
BASOPHILS: 0 (ref 0–0.2)
BILIRUBIN, TOTAL: 0.3 MG/DL (ref 0.2–0.8)
BILIRUBIN: ABNORMAL
BLOOD UREA NITROGEN (BUN): 19 MG/DL (ref 7–25)
BUN/CREATININE RATIO: 15.7 (ref 7.4–23)
CALCIUM, SERUM: 9.8 MG/DL (ref 8.6–10.3)
CARBON DIOXIDE: 17 MMOL/L (ref 21–31)
CHLORIDE, SERUM: 109 MMOL/L (ref 98–107)
COLOR: YELLOW
CREATININE: 1.21 MG/DL (ref 0.6–1.2)
CULTURE REFLEX COMMENT: YES
EOSINOPHIL AUTOMATED: 0.8 %
EOSINOPHILS: 0.1 (ref 0–0.5)
EST GLOMERULAR FILTRATION RATE: 45 1.73M SQ
GLUCOSE, URINE, AUTOMATED: ABNORMAL MG/DL
GLUCOSE: 103 MG/DL (ref 70–99)
HEMATOCRIT: 36.3 % (ref 34.7–45.1)
HEMOGLOBIN: 11.8 GM/DL (ref 12–15.3)
K (POTASSIUM, SERUM): 3.7 MMOL/L (ref 3.5–5.1)
KETONES, URINE, AUTOMATED: ABNORMAL MG/DL
LEUKOCYTE, URINE, AUTOMATED: ABNORMAL
LIPASE: 40 U/L (ref 11–82)
LYMPHOCYTE AUTOMATED: 11.3 %
LYMPHOCYTES: 1.2 (ref 0.6–3.4)
MEAN CORPUSCULAR HGB: 30.1 PG (ref 26–34)
MEAN CORPUSCULAR VOL: 92.9 FL (ref 80–100)
MEAN PLATELET VOLUME: 8.2 FL (ref 6.8–10.2)
MG (MAGNESIUM, SERUM: 1.8 MG/DL (ref 1.6–2.6)
MONOCYTE AUTOMATED: 9.1 %
MONOCYTES: 0.9 (ref 0.3–1)
NA (SODIUM, SERUM): 140 MMOL/L (ref 133–144)
NEUTROPHIL ABSOLUTE: 8 (ref 1.7–7.7)
NEUTROPHIL AUTOMATED: 78.4 %
NITRITE, URINE AUTOMATED: NEGATIVE
PH, URINE: 5 (ref 5–8)
PLATELET COUNT: 423 K/MM3 (ref 150–450)
PROTEIN TOTAL: 7.7 G/DL (ref 6.4–8.9)
PROTEIN, URINE: 30 MG/DL
RBC: >100 /HPF (ref 0–2)
RED BLOOD CELL COUNT: 3.91 M/MM3 (ref 3.63–5.04)
RED CELL DISTRIBUTIO: 16.2 % (ref 11.9–15.9)
SPECIFIC GRAVITY UA: 1.03 (ref 1–1.03)
SQUAMOUS EPITHELIAL: ABNORMAL /LPF
URINE, BLOOD, AUTOMATED: ABNORMAL
UROBILINOGEN, URINE, AUTOMATED: ABNORMAL MG/DL
WBC: ABNORMAL /HPF (ref 0–5)
WHITE BLOOD CELL COU: 10.3 K/MM3 (ref 4–11)

## 2019-11-19 PROCEDURE — 93010 ELECTROCARDIOGRAM REPORT: CPT | Performed by: INTERNAL MEDICINE

## 2019-11-21 ENCOUNTER — OFFICE VISIT (OUTPATIENT)
Dept: INTERNAL MEDICINE | Age: 64
End: 2019-11-21

## 2019-11-21 ENCOUNTER — EXTERNAL RECORD (OUTPATIENT)
Dept: OTHER | Age: 64
End: 2019-11-21

## 2019-11-21 VITALS
SYSTOLIC BLOOD PRESSURE: 108 MMHG | BODY MASS INDEX: 22.82 KG/M2 | HEIGHT: 65 IN | WEIGHT: 137 LBS | TEMPERATURE: 98 F | OXYGEN SATURATION: 98 % | DIASTOLIC BLOOD PRESSURE: 60 MMHG | HEART RATE: 96 BPM

## 2019-11-21 DIAGNOSIS — R19.7 DIARRHEA, UNSPECIFIED TYPE: Primary | ICD-10-CM

## 2019-11-21 DIAGNOSIS — N39.0 URINARY TRACT INFECTION WITHOUT HEMATURIA, SITE UNSPECIFIED: ICD-10-CM

## 2019-11-21 DIAGNOSIS — R63.4 WEIGHT LOSS: ICD-10-CM

## 2019-11-21 DIAGNOSIS — E86.0 DEHYDRATION: ICD-10-CM

## 2019-11-21 LAB
*MEAN CORPUSCULAR HGB CONC: 32.9 G/DL (ref 32.5–35.8)
A/G RATIO: 1.09 (ref 1.1–2.4)
ALANINE AMINOTRANSFE: 15 U/L (ref 7–52)
ALBUMIN, SERUM (ALB): 3.8 G/DL (ref 3.5–5.7)
ALKALINE PHOSPHATASE (ALK): 143 U/L (ref 34–104)
AMOXACILLIN/CLAVULANATE: ABNORMAL
AMPICILLIN/SULBACTAM: ABNORMAL
AMPICILLIN: >16
ANION GAP: 14 MEQ/L (ref 8–16)
APPEARANCE: CLEAR
ASPARTATE AMINOTRANS: 21 U/L (ref 13–39)
AZTREONAM: <=4
BACTERIA: ABNORMAL /HPF
BASOPHIL AUTOMATED: 0.3 %
BASOPHILS: 0 (ref 0–0.2)
BILIRUBIN, TOTAL: 0.3 MG/DL (ref 0.2–0.8)
BILIRUBIN: ABNORMAL
BLOOD UREA NITROGEN (BUN): 19 MG/DL (ref 7–25)
BUN/CREATININE RATIO: 15 (ref 7.4–23)
CALCIUM, SERUM: 9.6 MG/DL (ref 8.6–10.3)
CARBON DIOXIDE: 18 MMOL/L (ref 21–31)
CEFAZOLIN: >16
CEFEPIME: <=2
CEFTRIAXONE: <=1
CHLORIDE, SERUM: 105 MMOL/L (ref 98–107)
CIPROFLOXACIN: <=1
COLOR: YELLOW
CREATININE: 1.27 MG/DL (ref 0.6–1.2)
CULTURE REFLEX COMMENT: YES
EOSINOPHIL AUTOMATED: 0.3 %
EOSINOPHILS: 0 (ref 0–0.5)
ERTAPENEM: <=0.5
EST GLOMERULAR FILTRATION RATE: 42 1.73M SQ
GENTAMICIN: >8
GLUCOSE, URINE, AUTOMATED: ABNORMAL MG/DL
GLUCOSE: 96 MG/DL (ref 70–99)
HEMATOCRIT: 35.3 % (ref 34.7–45.1)
HEMOGLOBIN: 11.6 GM/DL (ref 12–15.3)
IMIPENEM: <=0.5
K (POTASSIUM, SERUM): 3.6 MMOL/L (ref 3.5–5.1)
KETONES, URINE, AUTOMATED: 80 MG/DL
LEUKOCYTE, URINE, AUTOMATED: ABNORMAL
LEVOFLOXACIN: 1
LIPASE: 41 U/L (ref 11–82)
LYMPHOCYTE AUTOMATED: 8.6 %
LYMPHOCYTES: 0.9 (ref 0.6–3.4)
MEAN CORPUSCULAR HGB: 30.9 PG (ref 26–34)
MEAN CORPUSCULAR VOL: 94 FL (ref 80–100)
MEAN PLATELET VOLUME: 7.5 FL (ref 6.8–10.2)
MEROPENEM: <=1
MONOCYTE AUTOMATED: 9.6 %
MONOCYTES: 1 (ref 0.3–1)
NA (SODIUM, SERUM): 137 MMOL/L (ref 133–144)
NEUTROPHIL ABSOLUTE: 8.6 (ref 1.7–7.7)
NEUTROPHIL AUTOMATED: 81.2 %
NITRITE, URINE AUTOMATED: NEGATIVE
NITROFURANTOIN: <=32
PH, URINE: 6 (ref 5–8)
PIPERACILLIN/TAZOBACTAM: <=4
PLATELET COUNT: 394 K/MM3 (ref 150–450)
PROTEIN TOTAL: 7.3 G/DL (ref 6.4–8.9)
PROTEIN, URINE: 30 MG/DL
RBC: ABNORMAL /HPF (ref 0–2)
RED BLOOD CELL COUNT: 3.75 M/MM3 (ref 3.63–5.04)
RED CELL DISTRIBUTIO: 16.3 % (ref 11.9–15.9)
SPECIFIC GRAVITY UA: 1.02 (ref 1–1.03)
SQUAMOUS EPITHELIAL: ABNORMAL /LPF
TETRACYCLINE: >8
TOBRAMYCIN: >8
TRIMETHOPRIM/SULFAMETHOXAZOLE: ABNORMAL
URINE CULTURE: NORMAL
URINE CULTURE: NORMAL
URINE, BLOOD, AUTOMATED: ABNORMAL
UROBILINOGEN, URINE, AUTOMATED: ABNORMAL MG/DL
WBC: ABNORMAL /HPF (ref 0–5)
WHITE BLOOD CELL COU: 10.5 K/MM3 (ref 4–11)

## 2019-11-21 PROCEDURE — 99223 1ST HOSP IP/OBS HIGH 75: CPT | Performed by: HOSPITALIST

## 2019-11-21 PROCEDURE — 3078F DIAST BP <80 MM HG: CPT | Performed by: FAMILY MEDICINE

## 2019-11-21 PROCEDURE — 3074F SYST BP LT 130 MM HG: CPT | Performed by: FAMILY MEDICINE

## 2019-11-21 PROCEDURE — 99214 OFFICE O/P EST MOD 30 MIN: CPT | Performed by: FAMILY MEDICINE

## 2019-11-22 ENCOUNTER — EXTERNAL RECORD (OUTPATIENT)
Dept: GASTROENTEROLOGY | Age: 64
End: 2019-11-22

## 2019-11-22 ENCOUNTER — APPOINTMENT (OUTPATIENT)
Dept: CT IMAGING | Age: 64
End: 2019-11-22
Attending: INTERNAL MEDICINE

## 2019-11-22 LAB
*MEAN CORPUSCULAR HGB CONC: 33.1 G/DL (ref 32.5–35.8)
ADENOVIRUS F 40/41: NOT DETECTED
ANION GAP: 12 MEQ/L (ref 8–16)
ASTROVIRUS: NOT DETECTED
BASOPHIL AUTOMATED: 0.4 %
BASOPHILS: 0 (ref 0–0.2)
BLOOD UREA NITROGEN (BUN): 12 MG/DL (ref 7–25)
BUN/CREATININE RATIO: 13 (ref 7.4–23)
C DIF TOXIN A/B: NOT DETECTED
CALCIUM, SERUM: 8.5 MG/DL (ref 8.6–10.3)
CAMPYLOBACTER: NOT DETECTED
CARBON DIOXIDE: 17 MMOL/L (ref 21–31)
CHLORIDE, SERUM: 109 MMOL/L (ref 98–107)
CREATININE: 0.92 MG/DL (ref 0.6–1.2)
CRYPTOSPORIDIUM: NOT DETECTED
CYCLOSPORA CAYETANENSIS: NOT DETECTED
E. COLI O157: NORMAL
EAEC E. COLI ENTEROAGGREGATIVE: NOT DETECTED
EIEC E. COLI SHIG/ENTERO: NOT DETECTED
ENTAMOEBA HISTOLYTICA: NOT DETECTED
EOSINOPHIL AUTOMATED: 0.9 %
EOSINOPHILS: 0.1 (ref 0–0.5)
EPEC E. COLI ENTEROPATHOGENIC: NOT DETECTED
EST GLOMERULAR FILTRATION RATE: > 60 1.73M SQ
ETEC E. COLI ENTEROTOXIGENIC: NOT DETECTED
GIARDIA LAMBIA: NOT DETECTED
GLUCOSE: 82 MG/DL (ref 70–99)
HEMATOCRIT: 28.2 % (ref 34.7–45.1)
HEMOGLOBIN: 9.3 GM/DL (ref 12–15.3)
K (POTASSIUM, SERUM): 3.2 MMOL/L (ref 3.5–5.1)
LYMPHOCYTE AUTOMATED: 11.3 %
LYMPHOCYTES: 0.9 (ref 0.6–3.4)
MEAN CORPUSCULAR HGB: 30.8 PG (ref 26–34)
MEAN CORPUSCULAR VOL: 92.9 FL (ref 80–100)
MEAN PLATELET VOLUME: 7.2 FL (ref 6.8–10.2)
MONOCYTE AUTOMATED: 10.8 %
MONOCYTES: 0.9 (ref 0.3–1)
NA (SODIUM, SERUM): 138 MMOL/L (ref 133–144)
NEUTROPHIL ABSOLUTE: 6.2 (ref 1.7–7.7)
NEUTROPHIL AUTOMATED: 76.6 %
NOROVIRUS GI/GII: NOT DETECTED
PLATELET COUNT: 334 K/MM3 (ref 150–450)
PLEESIOMONAS SHIGELLOIDES: NOT DETECTED
RED BLOOD CELL COUNT: 3.04 M/MM3 (ref 3.63–5.04)
RED CELL DISTRIBUTIO: 16 % (ref 11.9–15.9)
ROTAVIRUS A: NOT DETECTED
SALMONELLA: NOT DETECTED
SAPOVIRUS: NOT DETECTED
SPEC SRC GI PANEL: NORMAL
STEC E. COLI SHIGA TOXIN PROD: NOT DETECTED
VIBRIO CHOLERAE: NOT DETECTED
VIBRIO: NOT DETECTED
WHITE BLOOD CELL COU: 8.1 K/MM3 (ref 4–11)
YERSINIA ENTEROCOLITICA: NOT DETECTED

## 2019-11-22 PROCEDURE — 99232 SBSQ HOSP IP/OBS MODERATE 35: CPT | Performed by: HOSPITALIST

## 2019-11-22 PROCEDURE — 99222 1ST HOSP IP/OBS MODERATE 55: CPT | Performed by: INTERNAL MEDICINE

## 2019-11-23 ENCOUNTER — EXTERNAL RECORD (OUTPATIENT)
Dept: HEALTH INFORMATION MANAGEMENT | Facility: OTHER | Age: 64
End: 2019-11-23

## 2019-11-23 LAB
*MEAN CORPUSCULAR HGB CONC: 33 G/DL (ref 32.5–35.8)
ANION GAP: 10 MEQ/L (ref 8–16)
BASOPHIL AUTOMATED: 1.4 %
BASOPHILS: 0.1 (ref 0–0.2)
BLOOD UREA NITROGEN (BUN): 8 MG/DL (ref 7–25)
BUN/CREATININE RATIO: 9.2 (ref 7.4–23)
CALCIUM, SERUM: 8.6 MG/DL (ref 8.6–10.3)
CARBON DIOXIDE: 19 MMOL/L (ref 21–31)
CHLORIDE, SERUM: 111 MMOL/L (ref 98–107)
CREATININE: 0.87 MG/DL (ref 0.6–1.2)
EOSINOPHIL AUTOMATED: 1.4 %
EOSINOPHILS: 0.1 (ref 0–0.5)
EST GLOMERULAR FILTRATION RATE: > 60 1.73M SQ
GLUCOSE: 81 MG/DL (ref 70–99)
HEMATOCRIT: 28.3 % (ref 34.7–45.1)
HEMOGLOBIN: 9.3 GM/DL (ref 12–15.3)
K (POTASSIUM, SERUM): 3.5 MMOL/L (ref 3.5–5.1)
LYMPHOCYTE AUTOMATED: 11.9 %
LYMPHOCYTES: 1.1 (ref 0.6–3.4)
MEAN CORPUSCULAR HGB: 30.7 PG (ref 26–34)
MEAN CORPUSCULAR VOL: 93 FL (ref 80–100)
MEAN PLATELET VOLUME: 7.8 FL (ref 6.8–10.2)
MONOCYTE AUTOMATED: 13.7 %
MONOCYTES: 1.3 (ref 0.3–1)
NA (SODIUM, SERUM): 140 MMOL/L (ref 133–144)
NEUTROPHIL ABSOLUTE: 6.7 (ref 1.7–7.7)
NEUTROPHIL AUTOMATED: 71.6 %
PLATELET COUNT: 334 K/MM3 (ref 150–450)
RED BLOOD CELL COUNT: 3.04 M/MM3 (ref 3.63–5.04)
RED CELL DISTRIBUTIO: 15.8 % (ref 11.9–15.9)
WHITE BLOOD CELL COU: 9.4 K/MM3 (ref 4–11)

## 2019-11-23 PROCEDURE — 43239 EGD BIOPSY SINGLE/MULTIPLE: CPT | Performed by: INTERNAL MEDICINE

## 2019-11-23 PROCEDURE — 99232 SBSQ HOSP IP/OBS MODERATE 35: CPT | Performed by: HOSPITALIST

## 2019-11-23 PROCEDURE — 45380 COLONOSCOPY AND BIOPSY: CPT | Performed by: INTERNAL MEDICINE

## 2019-11-24 LAB
*MEAN CORPUSCULAR HGB CONC: 31.5 G/DL (ref 32.5–35.8)
AMOXACILLIN/CLAVULANATE: ABNORMAL
AMPICILLIN/SULBACTAM: ABNORMAL
AMPICILLIN: >16
ANION GAP: 10 MEQ/L (ref 8–16)
AZTREONAM: <=4
BASOPHIL AUTOMATED: 0.6 %
BASOPHILS: 0.1 (ref 0–0.2)
BLOOD UREA NITROGEN (BUN): 5 MG/DL (ref 7–25)
BUN/CREATININE RATIO: 6.1 (ref 7.4–23)
CALCIUM, SERUM: 8.2 MG/DL (ref 8.6–10.3)
CARBON DIOXIDE: 21 MMOL/L (ref 21–31)
CEFAZOLIN: >16
CEFEPIME: <=2
CEFTRIAXONE: <=1
CHLORIDE, SERUM: 110 MMOL/L (ref 98–107)
CIPROFLOXACIN: <=1
CREATININE: 0.82 MG/DL (ref 0.6–1.2)
EOSINOPHIL AUTOMATED: 1.5 %
EOSINOPHILS: 0.1 (ref 0–0.5)
ERTAPENEM: <=0.5
EST GLOMERULAR FILTRATION RATE: > 60 1.73M SQ
GENTAMICIN: >8
GLUCOSE: 90 MG/DL (ref 70–99)
HEMATOCRIT: 26.3 % (ref 34.7–45.1)
HEMOGLOBIN: 8.3 GM/DL (ref 12–15.3)
IMIPENEM: <=0.5
K (POTASSIUM, SERUM): 3.2 MMOL/L (ref 3.5–5.1)
LEVOFLOXACIN: 1
LYMPHOCYTE AUTOMATED: 12.2 %
LYMPHOCYTES: 1.1 (ref 0.6–3.4)
MEAN CORPUSCULAR HGB: 29.2 PG (ref 26–34)
MEAN CORPUSCULAR VOL: 92.7 FL (ref 80–100)
MEAN PLATELET VOLUME: 8 FL (ref 6.8–10.2)
MEROPENEM: <=1
MONOCYTE AUTOMATED: 14.4 %
MONOCYTES: 1.3 (ref 0.3–1)
NA (SODIUM, SERUM): 141 MMOL/L (ref 133–144)
NEUTROPHIL ABSOLUTE: 6.5 (ref 1.7–7.7)
NEUTROPHIL AUTOMATED: 71.3 %
NITROFURANTOIN: <=32
PIPERACILLIN/TAZOBACTAM: 8
PLATELET COUNT: 353 K/MM3 (ref 150–450)
RED BLOOD CELL COUNT: 2.83 M/MM3 (ref 3.63–5.04)
RED CELL DISTRIBUTIO: 16.4 % (ref 11.9–15.9)
TETRACYCLINE: >8
TOBRAMYCIN: >8
TRIMETHOPRIM/SULFAMETHOXAZOLE: ABNORMAL
URINE CULTURE: NORMAL
URINE CULTURE: NORMAL
WHITE BLOOD CELL COU: 9.1 K/MM3 (ref 4–11)

## 2019-11-24 PROCEDURE — 99233 SBSQ HOSP IP/OBS HIGH 50: CPT | Performed by: HOSPITALIST

## 2019-11-24 PROCEDURE — 99233 SBSQ HOSP IP/OBS HIGH 50: CPT | Performed by: INTERNAL MEDICINE

## 2019-11-25 LAB
*MEAN CORPUSCULAR HGB CONC: 33.8 G/DL (ref 32.5–35.8)
ANION GAP: 10 MEQ/L (ref 8–16)
BASOPHIL AUTOMATED: 0.3 %
BASOPHILS: 0 (ref 0–0.2)
BLOOD UREA NITROGEN (BUN): 3 MG/DL (ref 7–25)
BUN/CREATININE RATIO: 3.8 (ref 7.4–23)
CALCIUM, SERUM: 8.4 MG/DL (ref 8.6–10.3)
CARBON DIOXIDE: 22 MMOL/L (ref 21–31)
CHLORIDE, SERUM: 108 MMOL/L (ref 98–107)
CREATININE: 0.78 MG/DL (ref 0.6–1.2)
EOSINOPHIL AUTOMATED: 1.4 %
EOSINOPHILS: 0.1 (ref 0–0.5)
EST GLOMERULAR FILTRATION RATE: > 60 1.73M SQ
GLUCOSE: 85 MG/DL (ref 70–99)
HEMATOCRIT: 25.7 % (ref 34.7–45.1)
HEMOGLOBIN: 8.7 GM/DL (ref 12–15.3)
K (POTASSIUM, SERUM): 3.2 MMOL/L (ref 3.5–5.1)
LYMPHOCYTE AUTOMATED: 11.5 %
LYMPHOCYTES: 1.2 (ref 0.6–3.4)
MEAN CORPUSCULAR HGB: 30.9 PG (ref 26–34)
MEAN CORPUSCULAR VOL: 91.7 FL (ref 80–100)
MEAN PLATELET VOLUME: 7.9 FL (ref 6.8–10.2)
MONOCYTE AUTOMATED: 12.6 %
MONOCYTES: 1.3 (ref 0.3–1)
NA (SODIUM, SERUM): 140 MMOL/L (ref 133–144)
NEUTROPHIL ABSOLUTE: 7.5 (ref 1.7–7.7)
NEUTROPHIL AUTOMATED: 74.2 %
PLATELET COUNT: 346 K/MM3 (ref 150–450)
RED BLOOD CELL COUNT: 2.81 M/MM3 (ref 3.63–5.04)
RED CELL DISTRIBUTIO: 15.7 % (ref 11.9–15.9)
WHITE BLOOD CELL COU: 10.2 K/MM3 (ref 4–11)

## 2019-11-25 PROCEDURE — 99232 SBSQ HOSP IP/OBS MODERATE 35: CPT | Performed by: INTERNAL MEDICINE

## 2019-11-25 PROCEDURE — 99233 SBSQ HOSP IP/OBS HIGH 50: CPT | Performed by: HOSPITALIST

## 2019-11-26 ENCOUNTER — E-ADVICE (OUTPATIENT)
Dept: CARDIOLOGY | Age: 64
End: 2019-11-26

## 2019-11-26 LAB
*MEAN CORPUSCULAR HGB CONC: 33.4 G/DL (ref 32.5–35.8)
ANION GAP: 10 MEQ/L (ref 8–16)
BASOPHIL AUTOMATED: 0.5 %
BASOPHILS: 0 (ref 0–0.2)
BLOOD UREA NITROGEN (BUN): 3 MG/DL (ref 7–25)
BUN/CREATININE RATIO: 3.8 (ref 7.4–23)
CALCIUM, SERUM: 8.5 MG/DL (ref 8.6–10.3)
CARBON DIOXIDE: 21 MMOL/L (ref 21–31)
CHLORIDE, SERUM: 109 MMOL/L (ref 98–107)
CREATININE: 0.79 MG/DL (ref 0.6–1.2)
EOSINOPHIL AUTOMATED: 1.4 %
EOSINOPHILS: 0.1 (ref 0–0.5)
EST GLOMERULAR FILTRATION RATE: > 60 1.73M SQ
GLUCOSE: 88 MG/DL (ref 70–99)
HEMATOCRIT: 26.7 % (ref 34.7–45.1)
HEMOGLOBIN: 8.9 GM/DL (ref 12–15.3)
K (POTASSIUM, SERUM): 3.1 MMOL/L (ref 3.5–5.1)
LYMPHOCYTE AUTOMATED: 10.6 %
LYMPHOCYTES: 1.1 (ref 0.6–3.4)
MEAN CORPUSCULAR HGB: 30.7 PG (ref 26–34)
MEAN CORPUSCULAR VOL: 92 FL (ref 80–100)
MEAN PLATELET VOLUME: 7.9 FL (ref 6.8–10.2)
MONOCYTE AUTOMATED: 10.8 %
MONOCYTES: 1.1 (ref 0.3–1)
NA (SODIUM, SERUM): 140 MMOL/L (ref 133–144)
NEUTROPHIL ABSOLUTE: 7.6 (ref 1.7–7.7)
NEUTROPHIL AUTOMATED: 76.7 %
PLATELET COUNT: 390 K/MM3 (ref 150–450)
RED BLOOD CELL COUNT: 2.9 M/MM3 (ref 3.63–5.04)
RED CELL DISTRIBUTIO: 16 % (ref 11.9–15.9)
WHITE BLOOD CELL COU: 10 K/MM3 (ref 4–11)

## 2019-11-26 PROCEDURE — 99233 SBSQ HOSP IP/OBS HIGH 50: CPT | Performed by: HOSPITALIST

## 2019-11-26 PROCEDURE — 99232 SBSQ HOSP IP/OBS MODERATE 35: CPT | Performed by: INTERNAL MEDICINE

## 2019-11-27 LAB
*MEAN CORPUSCULAR HGB CONC: 32.9 G/DL (ref 32.5–35.8)
ANION GAP: 8 MEQ/L (ref 8–16)
BASOPHIL AUTOMATED: 0.1 %
BASOPHILS: 0 (ref 0–0.2)
BLOOD UREA NITROGEN (BUN): 4 MG/DL (ref 7–25)
BUN/CREATININE RATIO: 4.8 (ref 7.4–23)
CALCIUM, SERUM: 8.8 MG/DL (ref 8.6–10.3)
CARBON DIOXIDE: 23 MMOL/L (ref 21–31)
CHLORIDE, SERUM: 109 MMOL/L (ref 98–107)
CREATININE: 0.83 MG/DL (ref 0.6–1.2)
EOSINOPHIL AUTOMATED: 0.1 %
EOSINOPHILS: 0 (ref 0–0.5)
EST GLOMERULAR FILTRATION RATE: > 60 1.73M SQ
GLUCOSE: 90 MG/DL (ref 70–99)
HEMATOCRIT: 27.9 % (ref 34.7–45.1)
HEMOGLOBIN: 9.2 GM/DL (ref 12–15.3)
K (POTASSIUM, SERUM): 3.5 MMOL/L (ref 3.5–5.1)
LYMPHOCYTE AUTOMATED: 15.5 %
LYMPHOCYTES: 1.4 (ref 0.6–3.4)
MEAN CORPUSCULAR HGB: 30.4 PG (ref 26–34)
MEAN CORPUSCULAR VOL: 92.6 FL (ref 80–100)
MEAN PLATELET VOLUME: 7.5 FL (ref 6.8–10.2)
MONOCYTE AUTOMATED: 9.9 %
MONOCYTES: 0.9 (ref 0.3–1)
NA (SODIUM, SERUM): 140 MMOL/L (ref 133–144)
NEUTROPHIL ABSOLUTE: 6.6 (ref 1.7–7.7)
NEUTROPHIL AUTOMATED: 74.4 %
PLATELET COUNT: 404 K/MM3 (ref 150–450)
RED BLOOD CELL COUNT: 3.01 M/MM3 (ref 3.63–5.04)
RED CELL DISTRIBUTIO: 16.1 % (ref 11.9–15.9)
WHITE BLOOD CELL COU: 8.9 K/MM3 (ref 4–11)

## 2019-11-27 PROCEDURE — 99232 SBSQ HOSP IP/OBS MODERATE 35: CPT | Performed by: INTERNAL MEDICINE

## 2019-11-27 PROCEDURE — 99233 SBSQ HOSP IP/OBS HIGH 50: CPT | Performed by: HOSPITALIST

## 2019-11-28 ENCOUNTER — EXTERNAL RECORD (OUTPATIENT)
Dept: OTHER | Age: 64
End: 2019-11-28

## 2019-11-28 LAB
BLOOD CULTURE: NORMAL
BLOOD CULTURE: NORMAL

## 2019-11-28 PROCEDURE — 99239 HOSP IP/OBS DSCHRG MGMT >30: CPT | Performed by: HOSPITALIST

## 2019-11-28 PROCEDURE — 99232 SBSQ HOSP IP/OBS MODERATE 35: CPT | Performed by: INTERNAL MEDICINE

## 2019-11-29 ENCOUNTER — TELEPHONE (OUTPATIENT)
Dept: GASTROENTEROLOGY | Age: 64
End: 2019-11-29

## 2019-11-29 ENCOUNTER — TELEPHONE (OUTPATIENT)
Dept: CARDIOLOGY | Age: 64
End: 2019-11-29

## 2019-11-29 ENCOUNTER — TELEPHONE (OUTPATIENT)
Dept: INTERNAL MEDICINE | Age: 64
End: 2019-11-29

## 2019-11-29 DIAGNOSIS — R79.89 ELEVATED SERUM CREATININE: Primary | ICD-10-CM

## 2019-12-02 ENCOUNTER — TELEPHONE (OUTPATIENT)
Dept: INTERNAL MEDICINE | Age: 64
End: 2019-12-02

## 2019-12-03 ENCOUNTER — APPOINTMENT (OUTPATIENT)
Dept: GASTROENTEROLOGY | Age: 64
End: 2019-12-03
Attending: INTERNAL MEDICINE

## 2019-12-05 ENCOUNTER — OFFICE VISIT (OUTPATIENT)
Dept: INTERNAL MEDICINE | Age: 64
End: 2019-12-05

## 2019-12-05 VITALS
OXYGEN SATURATION: 97 % | WEIGHT: 138 LBS | HEIGHT: 65 IN | BODY MASS INDEX: 22.99 KG/M2 | DIASTOLIC BLOOD PRESSURE: 80 MMHG | HEART RATE: 82 BPM | SYSTOLIC BLOOD PRESSURE: 133 MMHG

## 2019-12-05 DIAGNOSIS — R19.7 DIARRHEA, UNSPECIFIED TYPE: Primary | ICD-10-CM

## 2019-12-05 DIAGNOSIS — R63.4 WEIGHT LOSS: ICD-10-CM

## 2019-12-05 DIAGNOSIS — Z11.59 NEED FOR HEPATITIS C SCREENING TEST: ICD-10-CM

## 2019-12-05 DIAGNOSIS — E78.2 MIXED HYPERLIPIDEMIA: ICD-10-CM

## 2019-12-05 DIAGNOSIS — N39.0 URINARY TRACT INFECTION WITHOUT HEMATURIA, SITE UNSPECIFIED: ICD-10-CM

## 2019-12-05 DIAGNOSIS — G43.809 OTHER MIGRAINE WITHOUT STATUS MIGRAINOSUS, NOT INTRACTABLE: ICD-10-CM

## 2019-12-05 DIAGNOSIS — I10 ESSENTIAL HYPERTENSION: ICD-10-CM

## 2019-12-05 PROCEDURE — 3075F SYST BP GE 130 - 139MM HG: CPT | Performed by: FAMILY MEDICINE

## 2019-12-05 PROCEDURE — 99214 OFFICE O/P EST MOD 30 MIN: CPT | Performed by: FAMILY MEDICINE

## 2019-12-05 PROCEDURE — 3079F DIAST BP 80-89 MM HG: CPT | Performed by: FAMILY MEDICINE

## 2019-12-10 ENCOUNTER — APPOINTMENT (OUTPATIENT)
Dept: CT IMAGING | Age: 64
End: 2019-12-10

## 2019-12-10 ENCOUNTER — LAB SERVICES (OUTPATIENT)
Dept: LAB | Age: 64
End: 2019-12-10

## 2019-12-10 ENCOUNTER — OFFICE VISIT (OUTPATIENT)
Dept: GASTROENTEROLOGY | Age: 64
End: 2019-12-10

## 2019-12-10 VITALS
SYSTOLIC BLOOD PRESSURE: 138 MMHG | BODY MASS INDEX: 22.49 KG/M2 | DIASTOLIC BLOOD PRESSURE: 80 MMHG | HEIGHT: 65 IN | WEIGHT: 135 LBS

## 2019-12-10 DIAGNOSIS — K29.80 DUODENITIS: ICD-10-CM

## 2019-12-10 DIAGNOSIS — R63.4 UNINTENTIONAL WEIGHT LOSS: Primary | ICD-10-CM

## 2019-12-10 DIAGNOSIS — R63.4 UNINTENTIONAL WEIGHT LOSS: ICD-10-CM

## 2019-12-10 DIAGNOSIS — D64.9 NORMOCYTIC ANEMIA: ICD-10-CM

## 2019-12-10 LAB
ALBUMIN SERPL-MCNC: 4.1 G/DL (ref 3.6–5.1)
ALP SERPL-CCNC: 116 U/L (ref 45–130)
ALT SERPL W/O P-5'-P-CCNC: 16 U/L (ref 4–38)
AST SERPL-CCNC: 23 U/L (ref 14–43)
BASOPHIL % MD: 1 % (ref 0–1.2)
BASOPHIL ABSOLUTE # MD: 0.1 /UL (ref 0–0.1)
BILIRUB SERPL-MCNC: 0.2 MG/DL (ref 0–1.3)
BUN SERPL-MCNC: 14 MG/DL (ref 7–20)
CALCIUM SERPL-MCNC: 9.8 MG/DL (ref 8.6–10.6)
CHLORIDE SERPL-SCNC: 112 MMOL/L (ref 96–107)
CO2 SERPL-SCNC: 21 MMOL/L (ref 22–32)
CREAT SERPL-MCNC: 1.2 MG/DL (ref 0.5–1.4)
DIFFERENTIAL TYPE: ABNORMAL
EOSINOPHIL % MD: 2 % (ref 0–10)
EOSINOPHIL ABSOLUTE # MD: 0.2 /UL (ref 0–0.5)
FERRITIN SERPL-MCNC: 40 NG/ML (ref 11–264)
GFR SERPL CREATININE-BSD FRML MDRD: 45 ML/MIN/{1.73M2}
GFR SERPL CREATININE-BSD FRML MDRD: 54 ML/MIN/{1.73M2}
GLUCOSE SERPL-MCNC: 90 MG/DL (ref 70–200)
HEMATOCRIT: 36.1 % (ref 34–45)
HEMOGLOBIN: 10.8 G/DL (ref 11.2–15.7)
IRON SATN MFR SERPL: 17 % (ref 9–55)
IRON SERPL-MCNC: 47 UG/DL (ref 37–170)
LYMPH PERCENT MD: 31 % (ref 20.5–51.1)
LYMPHOCYTE ABSOLUTE # MD: 2.8 /UL (ref 1.2–3.4)
MEAN CORPUSCULAR HGB CONCENTRATION: 29.9 % (ref 32–36)
MEAN CORPUSCULAR HGB: 29.4 PG (ref 27–34)
MEAN CORPUSCULAR VOLUME: 98.4 FL (ref 79–95)
MEAN PLATELET VOLUME: 10.4 FL (ref 8.6–12.4)
MONOCYTE ABSOLUTE # MD: 0.3 /UL (ref 0.2–0.9)
MONOCYTE PERCENT MD: 3 % (ref 4.3–12.9)
NEUTROPHIL ABSOLUTE # MD: 5.7 /UL (ref 1.4–6.5)
NEUTROPHIL PERCENT MD: 63 % (ref 34–73.5)
PLATELET COUNT: 549 10*3/UL (ref 150–400)
POTASSIUM SERPL-SCNC: 4.6 MMOL/L (ref 3.5–5.3)
PROT SERPL-MCNC: 7.4 G/DL (ref 6.4–8.5)
RED BLOOD CELL COUNT: 3.67 10*6/UL (ref 3.7–5.2)
RED CELL DISTRIBUTION WIDTH: 16.1 % (ref 11.3–14.8)
SODIUM SERPL-SCNC: 142 MMOL/L (ref 136–146)
TIBC SERPL-MCNC: 282 UG/DL (ref 250–450)
WHITE BLOOD CELL COUNT: 9 10*3/UL (ref 4–10)

## 2019-12-10 PROCEDURE — 83550 IRON BINDING TEST: CPT | Performed by: INTERNAL MEDICINE

## 2019-12-10 PROCEDURE — 3079F DIAST BP 80-89 MM HG: CPT | Performed by: INTERNAL MEDICINE

## 2019-12-10 PROCEDURE — 82728 ASSAY OF FERRITIN: CPT | Performed by: INTERNAL MEDICINE

## 2019-12-10 PROCEDURE — 36415 COLL VENOUS BLD VENIPUNCTURE: CPT | Performed by: INTERNAL MEDICINE

## 2019-12-10 PROCEDURE — 3075F SYST BP GE 130 - 139MM HG: CPT | Performed by: INTERNAL MEDICINE

## 2019-12-10 PROCEDURE — 99214 OFFICE O/P EST MOD 30 MIN: CPT | Performed by: INTERNAL MEDICINE

## 2019-12-10 PROCEDURE — 80053 COMPREHEN METABOLIC PANEL: CPT | Performed by: INTERNAL MEDICINE

## 2019-12-10 PROCEDURE — 85007 BL SMEAR W/DIFF WBC COUNT: CPT | Performed by: INTERNAL MEDICINE

## 2019-12-10 PROCEDURE — 85027 COMPLETE CBC AUTOMATED: CPT | Performed by: INTERNAL MEDICINE

## 2019-12-10 PROCEDURE — 83540 ASSAY OF IRON: CPT | Performed by: INTERNAL MEDICINE

## 2019-12-10 PROCEDURE — 83516 IMMUNOASSAY NONANTIBODY: CPT | Performed by: INTERNAL MEDICINE

## 2019-12-11 LAB
GLIADIN PEPTIDE IGA SER IA-ACNC: 0.7 U/ML (ref 0–7)
GLIADIN PEPTIDE IGG SER IA-ACNC: <0.4 U/ML (ref 0–7)
TTG IGA SER IA-ACNC: 0.3 U/ML (ref 0–7)
TTG IGG SER IA-ACNC: <0.6 U/ML (ref 0–7)

## 2019-12-13 RX ORDER — ATORVASTATIN CALCIUM 40 MG/1
40 TABLET, FILM COATED ORAL DAILY
Qty: 90 TABLET | Refills: 0 | Status: SHIPPED | OUTPATIENT
Start: 2019-12-13 | End: 2020-02-26 | Stop reason: SDUPTHER

## 2019-12-19 ENCOUNTER — APPOINTMENT (OUTPATIENT)
Dept: CT IMAGING | Age: 64
End: 2019-12-19
Attending: INTERNAL MEDICINE

## 2020-01-02 ENCOUNTER — TELEPHONE (OUTPATIENT)
Dept: INTERNAL MEDICINE | Age: 65
End: 2020-01-02

## 2020-01-13 ENCOUNTER — LAB SERVICES (OUTPATIENT)
Dept: LAB | Age: 65
End: 2020-01-13

## 2020-01-13 DIAGNOSIS — E87.20 METABOLIC ACIDOSIS: ICD-10-CM

## 2020-01-13 DIAGNOSIS — N18.30 CHRONIC KIDNEY DISEASE, STAGE III (MODERATE) (CMD): ICD-10-CM

## 2020-01-13 LAB
BUN SERPL-MCNC: 18 MG/DL (ref 7–20)
CALCIUM SERPL-MCNC: 9.8 MG/DL (ref 8.6–10.6)
CHLORIDE SERPL-SCNC: 113 MMOL/L (ref 96–107)
CO2 SERPL-SCNC: 19 MMOL/L (ref 22–32)
CREAT SERPL-MCNC: 1.1 MG/DL (ref 0.5–1.4)
GFR SERPL CREATININE-BSD FRML MDRD: 50 ML/MIN/{1.73M2}
GFR SERPL CREATININE-BSD FRML MDRD: >60 ML/MIN/{1.73M2}
GLUCOSE SERPL-MCNC: 85 MG/DL (ref 70–200)
POTASSIUM SERPL-SCNC: 4.8 MMOL/L (ref 3.5–5.3)
SODIUM SERPL-SCNC: 141 MMOL/L (ref 136–146)

## 2020-01-13 PROCEDURE — 80048 BASIC METABOLIC PNL TOTAL CA: CPT | Performed by: INTERNAL MEDICINE

## 2020-01-13 PROCEDURE — 36415 COLL VENOUS BLD VENIPUNCTURE: CPT | Performed by: INTERNAL MEDICINE

## 2020-01-14 ENCOUNTER — TELEPHONE (OUTPATIENT)
Dept: MAMMOGRAPHY | Age: 65
End: 2020-01-14

## 2020-01-14 ENCOUNTER — IMAGING SERVICES (OUTPATIENT)
Dept: CT IMAGING | Age: 65
End: 2020-01-14
Attending: INTERNAL MEDICINE

## 2020-01-14 DIAGNOSIS — I71.40 ABDOMINAL AORTIC ANEURYSM WITHOUT RUPTURE (CMD): Primary | ICD-10-CM

## 2020-01-14 DIAGNOSIS — I25.119 CORONARY ARTERY DISEASE INVOLVING NATIVE CORONARY ARTERY OF NATIVE HEART WITH ANGINA PECTORIS (CMD): ICD-10-CM

## 2020-01-14 DIAGNOSIS — I71.40 ABDOMINAL AORTIC ANEURYSM WITHOUT RUPTURE (CMD): ICD-10-CM

## 2020-01-14 LAB — GFR SERPLBLD BASED ON 1.73 SQ M-ARVRAT: 42 ML/MIN

## 2020-01-14 PROCEDURE — 82565 ASSAY OF CREATININE: CPT | Performed by: INTERNAL MEDICINE

## 2020-01-17 ENCOUNTER — OFFICE VISIT (OUTPATIENT)
Dept: NEPHROLOGY | Age: 65
End: 2020-01-17

## 2020-01-17 VITALS
DIASTOLIC BLOOD PRESSURE: 62 MMHG | HEIGHT: 65 IN | HEART RATE: 84 BPM | SYSTOLIC BLOOD PRESSURE: 118 MMHG | WEIGHT: 136 LBS | BODY MASS INDEX: 22.66 KG/M2

## 2020-01-17 DIAGNOSIS — N28.1 RENAL CYST: ICD-10-CM

## 2020-01-17 DIAGNOSIS — D63.1 ANEMIA OF CHRONIC RENAL FAILURE, STAGE 3 (MODERATE) (CMD): ICD-10-CM

## 2020-01-17 DIAGNOSIS — N18.30 ANEMIA OF CHRONIC RENAL FAILURE, STAGE 3 (MODERATE) (CMD): ICD-10-CM

## 2020-01-17 DIAGNOSIS — I10 ESSENTIAL HYPERTENSION: ICD-10-CM

## 2020-01-17 DIAGNOSIS — N20.0 KIDNEY STONE: ICD-10-CM

## 2020-01-17 DIAGNOSIS — D50.9 IRON DEFICIENCY ANEMIA, UNSPECIFIED IRON DEFICIENCY ANEMIA TYPE: ICD-10-CM

## 2020-01-17 DIAGNOSIS — N18.30 CKD (CHRONIC KIDNEY DISEASE) STAGE 3, GFR 30-59 ML/MIN (CMD): Primary | ICD-10-CM

## 2020-01-17 DIAGNOSIS — I12.9 NEPHROSCLEROSIS ARTERIOLAR, STAGE 1-4 OR UNSPECIFIED CHRONIC KIDNEY DISEASE: ICD-10-CM

## 2020-01-17 PROCEDURE — 3074F SYST BP LT 130 MM HG: CPT | Performed by: INTERNAL MEDICINE

## 2020-01-17 PROCEDURE — 3078F DIAST BP <80 MM HG: CPT | Performed by: INTERNAL MEDICINE

## 2020-01-17 PROCEDURE — 99214 OFFICE O/P EST MOD 30 MIN: CPT | Performed by: INTERNAL MEDICINE

## 2020-01-22 ENCOUNTER — ANCILLARY PROCEDURE (OUTPATIENT)
Dept: CARDIOLOGY | Age: 65
End: 2020-01-22
Attending: INTERNAL MEDICINE

## 2020-01-22 DIAGNOSIS — G45.9 TIA (TRANSIENT ISCHEMIC ATTACK): ICD-10-CM

## 2020-01-22 PROCEDURE — 93291 INTERROG DEV EVAL SCRMS IP: CPT | Performed by: INTERNAL MEDICINE

## 2020-01-28 ENCOUNTER — E-ADVICE (OUTPATIENT)
Dept: CARDIOLOGY | Age: 65
End: 2020-01-28

## 2020-02-05 LAB
ANION GAP: 7 MEQ/L (ref 8–16)
BLOOD UREA NITROGEN (BUN): 20 MG/DL (ref 7–25)
BUN/CREATININE RATIO: 16.5 (ref 7.4–23)
CALCIUM, SERUM: 8.8 MG/DL (ref 8.6–10.3)
CARBON DIOXIDE: 18 MMOL/L (ref 21–31)
CHLORIDE, SERUM: 115 MMOL/L (ref 98–107)
CREATININE: 1.21 MG/DL (ref 0.6–1.2)
EST GLOMERULAR FILTRATION RATE: 45 1.73M SQ
GLUCOSE: 94 MG/DL (ref 70–99)
K (POTASSIUM, SERUM): 3.9 MMOL/L (ref 3.5–5.1)
NA (SODIUM, SERUM): 140 MMOL/L (ref 133–144)

## 2020-02-07 ENCOUNTER — LAB SERVICES (OUTPATIENT)
Dept: LAB | Age: 65
End: 2020-02-07

## 2020-02-07 DIAGNOSIS — I10 ESSENTIAL HYPERTENSION: ICD-10-CM

## 2020-02-07 DIAGNOSIS — I12.9 NEPHROSCLEROSIS ARTERIOLAR, STAGE 1-4 OR UNSPECIFIED CHRONIC KIDNEY DISEASE: ICD-10-CM

## 2020-02-07 DIAGNOSIS — N20.0 KIDNEY STONE: ICD-10-CM

## 2020-02-07 DIAGNOSIS — D63.1 ANEMIA OF CHRONIC RENAL FAILURE, STAGE 3 (MODERATE) (CMD): ICD-10-CM

## 2020-02-07 DIAGNOSIS — N18.30 CKD (CHRONIC KIDNEY DISEASE) STAGE 3, GFR 30-59 ML/MIN (CMD): ICD-10-CM

## 2020-02-07 DIAGNOSIS — D50.9 IRON DEFICIENCY ANEMIA, UNSPECIFIED IRON DEFICIENCY ANEMIA TYPE: ICD-10-CM

## 2020-02-07 DIAGNOSIS — N18.30 ANEMIA OF CHRONIC RENAL FAILURE, STAGE 3 (MODERATE) (CMD): ICD-10-CM

## 2020-02-07 DIAGNOSIS — N28.1 RENAL CYST: ICD-10-CM

## 2020-02-07 LAB
BUN SERPL-MCNC: 16 MG/DL (ref 7–20)
CALCIUM SERPL-MCNC: 9.6 MG/DL (ref 8.6–10.6)
CHLORIDE SERPL-SCNC: 112 MMOL/L (ref 96–107)
CO2 SERPL-SCNC: 19 MMOL/L (ref 22–32)
CREAT SERPL-MCNC: 1.1 MG/DL (ref 0.5–1.4)
FERRITIN SERPL-MCNC: 20 NG/ML (ref 11–264)
GFR SERPL CREATININE-BSD FRML MDRD: 50 ML/MIN/{1.73M2}
GFR SERPL CREATININE-BSD FRML MDRD: >60 ML/MIN/{1.73M2}
GLUCOSE SERPL-MCNC: 82 MG/DL (ref 70–200)
HEMATOCRIT: 38.8 % (ref 34–45)
HEMOGLOBIN: 11.7 G/DL (ref 11.2–15.7)
IRON SATN MFR SERPL: 19 % (ref 9–55)
IRON SERPL-MCNC: 61 UG/DL (ref 37–170)
POTASSIUM SERPL-SCNC: 4 MMOL/L (ref 3.5–5.3)
SODIUM SERPL-SCNC: 139 MMOL/L (ref 136–146)
TIBC SERPL-MCNC: 318 UG/DL (ref 250–450)

## 2020-02-07 PROCEDURE — 85018 HEMOGLOBIN: CPT | Performed by: INTERNAL MEDICINE

## 2020-02-07 PROCEDURE — 83550 IRON BINDING TEST: CPT | Performed by: INTERNAL MEDICINE

## 2020-02-07 PROCEDURE — 83540 ASSAY OF IRON: CPT | Performed by: INTERNAL MEDICINE

## 2020-02-07 PROCEDURE — 36415 COLL VENOUS BLD VENIPUNCTURE: CPT | Performed by: INTERNAL MEDICINE

## 2020-02-07 PROCEDURE — 85014 HEMATOCRIT: CPT | Performed by: INTERNAL MEDICINE

## 2020-02-07 PROCEDURE — 80048 BASIC METABOLIC PNL TOTAL CA: CPT | Performed by: INTERNAL MEDICINE

## 2020-02-07 PROCEDURE — 82728 ASSAY OF FERRITIN: CPT | Performed by: INTERNAL MEDICINE

## 2020-02-13 ENCOUNTER — OFFICE VISIT (OUTPATIENT)
Dept: NEPHROLOGY | Age: 65
End: 2020-02-13

## 2020-02-13 VITALS
HEART RATE: 72 BPM | SYSTOLIC BLOOD PRESSURE: 134 MMHG | DIASTOLIC BLOOD PRESSURE: 80 MMHG | HEIGHT: 65 IN | BODY MASS INDEX: 22.66 KG/M2 | WEIGHT: 136 LBS

## 2020-02-13 DIAGNOSIS — I70.1 RENAL ARTERY STENOSIS (CMD): ICD-10-CM

## 2020-02-13 DIAGNOSIS — D63.1 ANEMIA OF CHRONIC RENAL FAILURE, STAGE 3 (MODERATE) (CMD): ICD-10-CM

## 2020-02-13 DIAGNOSIS — N28.1 RENAL CYST: ICD-10-CM

## 2020-02-13 DIAGNOSIS — I10 ESSENTIAL HYPERTENSION: ICD-10-CM

## 2020-02-13 DIAGNOSIS — N18.30 ANEMIA OF CHRONIC RENAL FAILURE, STAGE 3 (MODERATE) (CMD): ICD-10-CM

## 2020-02-13 DIAGNOSIS — I12.9 NEPHROSCLEROSIS ARTERIOLAR, STAGE 1-4 OR UNSPECIFIED CHRONIC KIDNEY DISEASE: ICD-10-CM

## 2020-02-13 DIAGNOSIS — D50.9 IRON DEFICIENCY ANEMIA, UNSPECIFIED IRON DEFICIENCY ANEMIA TYPE: ICD-10-CM

## 2020-02-13 DIAGNOSIS — N18.30 CKD (CHRONIC KIDNEY DISEASE) STAGE 3, GFR 30-59 ML/MIN (CMD): Primary | ICD-10-CM

## 2020-02-13 PROCEDURE — 99214 OFFICE O/P EST MOD 30 MIN: CPT | Performed by: INTERNAL MEDICINE

## 2020-02-13 PROCEDURE — 3079F DIAST BP 80-89 MM HG: CPT | Performed by: INTERNAL MEDICINE

## 2020-02-13 PROCEDURE — 3075F SYST BP GE 130 - 139MM HG: CPT | Performed by: INTERNAL MEDICINE

## 2020-02-24 ENCOUNTER — OFFICE VISIT (OUTPATIENT)
Dept: GASTROENTEROLOGY | Age: 65
End: 2020-02-24

## 2020-02-24 ENCOUNTER — TELEPHONE (OUTPATIENT)
Dept: GASTROENTEROLOGY | Age: 65
End: 2020-02-24

## 2020-02-24 VITALS
WEIGHT: 134 LBS | DIASTOLIC BLOOD PRESSURE: 70 MMHG | HEIGHT: 65 IN | BODY MASS INDEX: 22.33 KG/M2 | SYSTOLIC BLOOD PRESSURE: 126 MMHG

## 2020-02-24 DIAGNOSIS — R19.5 HEME POSITIVE STOOL: Primary | ICD-10-CM

## 2020-02-24 PROCEDURE — 3074F SYST BP LT 130 MM HG: CPT | Performed by: INTERNAL MEDICINE

## 2020-02-24 PROCEDURE — 99214 OFFICE O/P EST MOD 30 MIN: CPT | Performed by: INTERNAL MEDICINE

## 2020-02-24 PROCEDURE — 3078F DIAST BP <80 MM HG: CPT | Performed by: INTERNAL MEDICINE

## 2020-02-24 SDOH — HEALTH STABILITY: MENTAL HEALTH: HOW OFTEN DO YOU HAVE A DRINK CONTAINING ALCOHOL?: NEVER

## 2020-02-25 ENCOUNTER — LAB SERVICES (OUTPATIENT)
Dept: LAB | Age: 65
End: 2020-02-25

## 2020-02-25 DIAGNOSIS — E78.2 MIXED HYPERLIPIDEMIA: ICD-10-CM

## 2020-02-25 DIAGNOSIS — Z11.59 NEED FOR HEPATITIS C SCREENING TEST: ICD-10-CM

## 2020-02-25 DIAGNOSIS — I10 ESSENTIAL HYPERTENSION: ICD-10-CM

## 2020-02-25 LAB
ALBUMIN SERPL-MCNC: 4.2 G/DL (ref 3.6–5.1)
ALP SERPL-CCNC: 101 U/L (ref 45–130)
ALT SERPL W/O P-5'-P-CCNC: 21 U/L (ref 4–38)
AST SERPL-CCNC: 30 U/L (ref 14–43)
BILIRUB SERPL-MCNC: 0.2 MG/DL (ref 0–1.3)
BUN SERPL-MCNC: 15 MG/DL (ref 7–20)
CALCIUM SERPL-MCNC: 9.6 MG/DL (ref 8.6–10.6)
CHLORIDE SERPL-SCNC: 113 MMOL/L (ref 96–107)
CHOLEST SERPL-MCNC: 199 MG/DL (ref 140–200)
CO2 SERPL-SCNC: 21 MMOL/L (ref 22–32)
CREAT SERPL-MCNC: 1.1 MG/DL (ref 0.5–1.4)
GFR SERPL CREATININE-BSD FRML MDRD: 50 ML/MIN/{1.73M2}
GFR SERPL CREATININE-BSD FRML MDRD: >60 ML/MIN/{1.73M2}
GLUCOSE P FAST SERPL-MCNC: 84 MG/DL (ref 60–100)
HDLC SERPL-MCNC: 45 MG/DL
HEPATITIS C ANTIBODY: NEGATIVE
LDLC SERPL DIRECT ASSAY-MCNC: 104 MG/DL (ref 30–100)
POTASSIUM SERPL-SCNC: 5.1 MMOL/L (ref 3.5–5.3)
PROT SERPL-MCNC: 7.1 G/DL (ref 6.4–8.5)
SODIUM SERPL-SCNC: 140 MMOL/L (ref 136–146)
TRIGL SERPL-MCNC: 252 MG/DL (ref 0–200)

## 2020-02-25 PROCEDURE — 86803 HEPATITIS C AB TEST: CPT | Performed by: FAMILY MEDICINE

## 2020-02-25 PROCEDURE — 80061 LIPID PANEL: CPT | Performed by: FAMILY MEDICINE

## 2020-02-25 PROCEDURE — 83721 ASSAY OF BLOOD LIPOPROTEIN: CPT | Performed by: FAMILY MEDICINE

## 2020-02-25 PROCEDURE — 80053 COMPREHEN METABOLIC PANEL: CPT | Performed by: FAMILY MEDICINE

## 2020-02-25 PROCEDURE — 36415 COLL VENOUS BLD VENIPUNCTURE: CPT | Performed by: FAMILY MEDICINE

## 2020-02-26 ENCOUNTER — ANCILLARY PROCEDURE (OUTPATIENT)
Dept: CARDIOLOGY | Age: 65
End: 2020-02-26
Attending: INTERNAL MEDICINE

## 2020-02-26 DIAGNOSIS — G45.9 TIA (TRANSIENT ISCHEMIC ATTACK): ICD-10-CM

## 2020-02-26 PROCEDURE — G2066 INTER DEVC REMOTE 30D: HCPCS | Performed by: INTERNAL MEDICINE

## 2020-02-26 PROCEDURE — 93298 REM INTERROG DEV EVAL SCRMS: CPT | Performed by: INTERNAL MEDICINE

## 2020-02-27 ENCOUNTER — OFFICE VISIT (OUTPATIENT)
Dept: INTERNAL MEDICINE | Age: 65
End: 2020-02-27

## 2020-02-27 VITALS
DIASTOLIC BLOOD PRESSURE: 81 MMHG | SYSTOLIC BLOOD PRESSURE: 120 MMHG | BODY MASS INDEX: 22.82 KG/M2 | HEART RATE: 73 BPM | WEIGHT: 137 LBS | HEIGHT: 65 IN

## 2020-02-27 DIAGNOSIS — M16.12 PRIMARY OSTEOARTHRITIS OF LEFT HIP: ICD-10-CM

## 2020-02-27 DIAGNOSIS — I10 ESSENTIAL HYPERTENSION: ICD-10-CM

## 2020-02-27 DIAGNOSIS — M85.80 OSTEOPENIA, UNSPECIFIED LOCATION: ICD-10-CM

## 2020-02-27 DIAGNOSIS — I25.10 CORONARY ARTERY DISEASE INVOLVING NATIVE HEART WITHOUT ANGINA PECTORIS, UNSPECIFIED VESSEL OR LESION TYPE: ICD-10-CM

## 2020-02-27 DIAGNOSIS — Z12.39 BREAST CANCER SCREENING: ICD-10-CM

## 2020-02-27 DIAGNOSIS — Z23 NEED FOR PNEUMOCOCCAL VACCINATION: ICD-10-CM

## 2020-02-27 DIAGNOSIS — G43.809 OTHER MIGRAINE WITHOUT STATUS MIGRAINOSUS, NOT INTRACTABLE: ICD-10-CM

## 2020-02-27 DIAGNOSIS — N18.30 STAGE 3 CHRONIC KIDNEY DISEASE (CMD): ICD-10-CM

## 2020-02-27 DIAGNOSIS — E78.5 HYPERLIPIDEMIA, UNSPECIFIED HYPERLIPIDEMIA TYPE: ICD-10-CM

## 2020-02-27 DIAGNOSIS — Z00.00 PE (PHYSICAL EXAM), ROUTINE: Primary | ICD-10-CM

## 2020-02-27 PROCEDURE — 90670 PCV13 VACCINE IM: CPT

## 2020-02-27 PROCEDURE — G0438 PPPS, INITIAL VISIT: HCPCS | Performed by: FAMILY MEDICINE

## 2020-02-27 PROCEDURE — 99497 ADVNCD CARE PLAN 30 MIN: CPT | Performed by: FAMILY MEDICINE

## 2020-02-27 PROCEDURE — G0009 ADMIN PNEUMOCOCCAL VACCINE: HCPCS

## 2020-02-27 PROCEDURE — 3079F DIAST BP 80-89 MM HG: CPT | Performed by: FAMILY MEDICINE

## 2020-02-27 PROCEDURE — 3074F SYST BP LT 130 MM HG: CPT | Performed by: FAMILY MEDICINE

## 2020-02-27 PROCEDURE — 99214 OFFICE O/P EST MOD 30 MIN: CPT | Performed by: FAMILY MEDICINE

## 2020-02-27 RX ORDER — ATORVASTATIN CALCIUM 40 MG/1
40 TABLET, FILM COATED ORAL DAILY
Qty: 90 TABLET | Refills: 1 | Status: SHIPPED | OUTPATIENT
Start: 2020-02-27 | End: 2020-08-17

## 2020-02-27 SDOH — HEALTH STABILITY: MENTAL HEALTH: HOW OFTEN DO YOU HAVE A DRINK CONTAINING ALCOHOL?: NEVER

## 2020-02-27 ASSESSMENT — PATIENT HEALTH QUESTIONNAIRE - PHQ9
1. LITTLE INTEREST OR PLEASURE IN DOING THINGS: NOT AT ALL
SUM OF ALL RESPONSES TO PHQ9 QUESTIONS 1 AND 2: 0
SUM OF ALL RESPONSES TO PHQ9 QUESTIONS 1 AND 2: 0
2. FEELING DOWN, DEPRESSED OR HOPELESS: NOT AT ALL

## 2020-02-28 ENCOUNTER — E-ADVICE (OUTPATIENT)
Dept: INTERNAL MEDICINE | Age: 65
End: 2020-02-28

## 2020-02-28 ENCOUNTER — ADVANCED DIRECTIVES (OUTPATIENT)
Dept: HEALTH INFORMATION MANAGEMENT | Facility: OTHER | Age: 65
End: 2020-02-28

## 2020-03-30 ENCOUNTER — E-ADVICE (OUTPATIENT)
Dept: CARDIOLOGY | Age: 65
End: 2020-03-30

## 2020-03-31 ENCOUNTER — ANCILLARY PROCEDURE (OUTPATIENT)
Dept: CARDIOLOGY | Age: 65
End: 2020-03-31
Attending: INTERNAL MEDICINE

## 2020-03-31 DIAGNOSIS — G45.9 TIA (TRANSIENT ISCHEMIC ATTACK): ICD-10-CM

## 2020-03-31 PROCEDURE — G2066 INTER DEVC REMOTE 30D: HCPCS | Performed by: INTERNAL MEDICINE

## 2020-03-31 PROCEDURE — 93298 REM INTERROG DEV EVAL SCRMS: CPT | Performed by: INTERNAL MEDICINE

## 2020-04-27 ENCOUNTER — TELEPHONE (OUTPATIENT)
Dept: NEPHROLOGY | Age: 65
End: 2020-04-27

## 2020-05-01 ENCOUNTER — ANCILLARY PROCEDURE (OUTPATIENT)
Dept: CARDIOLOGY | Age: 65
End: 2020-05-01
Attending: INTERNAL MEDICINE

## 2020-05-01 DIAGNOSIS — G45.9 TIA (TRANSIENT ISCHEMIC ATTACK): ICD-10-CM

## 2020-05-01 PROCEDURE — 93298 REM INTERROG DEV EVAL SCRMS: CPT | Performed by: INTERNAL MEDICINE

## 2020-05-01 PROCEDURE — G2066 INTER DEVC REMOTE 30D: HCPCS | Performed by: INTERNAL MEDICINE

## 2020-05-21 DIAGNOSIS — I71.40 ABDOMINAL AORTIC ANEURYSM WITHOUT RUPTURE (CMD): ICD-10-CM

## 2020-05-21 DIAGNOSIS — I25.119 CORONARY ARTERY DISEASE INVOLVING NATIVE CORONARY ARTERY OF NATIVE HEART WITH ANGINA PECTORIS (CMD): ICD-10-CM

## 2020-05-21 DIAGNOSIS — I10 ESSENTIAL HYPERTENSION: ICD-10-CM

## 2020-06-04 SDOH — HEALTH STABILITY: MENTAL HEALTH: HOW OFTEN DO YOU HAVE A DRINK CONTAINING ALCOHOL?: NEVER

## 2020-06-07 PROCEDURE — G2066 INTER DEVC REMOTE 30D: HCPCS | Performed by: INTERNAL MEDICINE

## 2020-06-07 PROCEDURE — 93298 REM INTERROG DEV EVAL SCRMS: CPT | Performed by: INTERNAL MEDICINE

## 2020-06-08 ENCOUNTER — ANCILLARY PROCEDURE (OUTPATIENT)
Dept: CARDIOLOGY | Age: 65
End: 2020-06-08
Attending: INTERNAL MEDICINE

## 2020-06-08 DIAGNOSIS — G45.9 TIA (TRANSIENT ISCHEMIC ATTACK): ICD-10-CM

## 2020-06-09 ASSESSMENT — ENCOUNTER SYMPTOMS
HEMATOLOGIC/LYMPHATIC NEGATIVE: 1
EYES NEGATIVE: 1
ENDOCRINE NEGATIVE: 1
CONSTITUTIONAL NEGATIVE: 1
PSYCHIATRIC NEGATIVE: 1
ALLERGIC/IMMUNOLOGIC NEGATIVE: 1
GASTROINTESTINAL NEGATIVE: 1

## 2020-06-11 ENCOUNTER — E-ADVICE (OUTPATIENT)
Dept: CARDIOLOGY | Age: 65
End: 2020-06-11

## 2020-06-11 ENCOUNTER — OFFICE VISIT (OUTPATIENT)
Dept: CARDIOLOGY | Age: 65
End: 2020-06-11

## 2020-06-11 DIAGNOSIS — Z72.0 TOBACCO USE: ICD-10-CM

## 2020-06-11 DIAGNOSIS — Q21.10 ASD (ATRIAL SEPTAL DEFECT): ICD-10-CM

## 2020-06-11 DIAGNOSIS — I71.40 ABDOMINAL AORTIC ANEURYSM WITHOUT RUPTURE (CMD): Primary | ICD-10-CM

## 2020-06-11 DIAGNOSIS — I10 ESSENTIAL HYPERTENSION: ICD-10-CM

## 2020-06-11 DIAGNOSIS — E78.2 MIXED HYPERLIPIDEMIA: ICD-10-CM

## 2020-06-11 DIAGNOSIS — Z98.890 S/P CARDIAC CATH: ICD-10-CM

## 2020-06-11 DIAGNOSIS — G45.9 TIA (TRANSIENT ISCHEMIC ATTACK): ICD-10-CM

## 2020-06-11 PROBLEM — I26.99 PE (PULMONARY THROMBOEMBOLISM) (CMD): Status: RESOLVED | Noted: 2018-09-01 | Resolved: 2020-06-11

## 2020-06-11 PROCEDURE — 99441 TELEPHONE E&M BY PHYSICIAN EST PT NOT ORIG PREV 7 DAYS 5-10 MIN: CPT | Performed by: INTERNAL MEDICINE

## 2020-06-12 ENCOUNTER — TELEPHONE (OUTPATIENT)
Dept: CARDIOLOGY | Age: 65
End: 2020-06-12

## 2020-06-30 ENCOUNTER — APPOINTMENT (OUTPATIENT)
Dept: NEPHROLOGY | Age: 65
End: 2020-06-30

## 2020-07-02 ENCOUNTER — TELEPHONE (OUTPATIENT)
Dept: INTERNAL MEDICINE | Age: 65
End: 2020-07-02

## 2020-07-02 DIAGNOSIS — R35.0 INCREASED URINARY FREQUENCY: Primary | ICD-10-CM

## 2020-07-03 ENCOUNTER — LAB SERVICES (OUTPATIENT)
Dept: LAB | Age: 65
End: 2020-07-03

## 2020-07-03 DIAGNOSIS — N18.30 CKD (CHRONIC KIDNEY DISEASE) STAGE 3, GFR 30-59 ML/MIN (CMD): ICD-10-CM

## 2020-07-03 DIAGNOSIS — R35.0 INCREASED URINARY FREQUENCY: ICD-10-CM

## 2020-07-03 DIAGNOSIS — N28.1 RENAL CYST: ICD-10-CM

## 2020-07-03 DIAGNOSIS — I10 ESSENTIAL HYPERTENSION: ICD-10-CM

## 2020-07-03 DIAGNOSIS — N20.0 KIDNEY STONE: ICD-10-CM

## 2020-07-03 DIAGNOSIS — D50.9 IRON DEFICIENCY ANEMIA, UNSPECIFIED IRON DEFICIENCY ANEMIA TYPE: ICD-10-CM

## 2020-07-03 DIAGNOSIS — N18.30 ANEMIA OF CHRONIC RENAL FAILURE, STAGE 3 (MODERATE) (CMD): ICD-10-CM

## 2020-07-03 DIAGNOSIS — D63.1 ANEMIA OF CHRONIC RENAL FAILURE, STAGE 3 (MODERATE) (CMD): ICD-10-CM

## 2020-07-03 DIAGNOSIS — I12.9 NEPHROSCLEROSIS ARTERIOLAR, STAGE 1-4 OR UNSPECIFIED CHRONIC KIDNEY DISEASE: ICD-10-CM

## 2020-07-03 DIAGNOSIS — I70.1 RENAL ARTERY STENOSIS (CMD): ICD-10-CM

## 2020-07-03 LAB
BILIRUBIN URINE: NEGATIVE
BLOOD URINE: ABNORMAL
BUN SERPL-MCNC: 22 MG/DL (ref 7–20)
CALCIUM SERPL-MCNC: 9.6 MG/DL (ref 8.6–10.6)
CALCIUM SERPL-MCNC: 9.6 MG/DL (ref 8.6–10.6)
CHLORIDE SERPL-SCNC: 111 MMOL/L (ref 96–107)
CLARITY: ABNORMAL
CO2 SERPL-SCNC: 20 MMOL/L (ref 22–32)
COLOR: YELLOW
CREAT SERPL-MCNC: 1.2 MG/DL (ref 0.5–1.4)
FERRITIN SERPL-MCNC: 25 NG/ML (ref 11–264)
GFR SERPL CREATININE-BSD FRML MDRD: 45 ML/MIN/{1.73M2}
GFR SERPL CREATININE-BSD FRML MDRD: 54 ML/MIN/{1.73M2}
GLUCOSE QUALITATIVE U: NEGATIVE
GLUCOSE SERPL-MCNC: 98 MG/DL (ref 70–200)
HEMATOCRIT: 37.9 % (ref 34–45)
HEMOGLOBIN: 12.6 G/DL (ref 11.2–15.7)
IRON SATN MFR SERPL: 21 % (ref 9–55)
IRON SERPL-MCNC: 68 UG/DL (ref 37–170)
KETONES, URINE: NEGATIVE
LEUKOCYTE ESTERASE URINE: ABNORMAL
MUCOUS: ABNORMAL
NITRITE URINE: NEGATIVE
PH URINE: 5 (ref 5–7)
PHOSPHATE SERPL-MCNC: 4.5 MG/DL (ref 2.5–4.9)
POTASSIUM SERPL-SCNC: 4.7 MMOL/L (ref 3.5–5.3)
PTH-INTACT SERPL-MCNC: 61.7 PG/ML (ref 23–73)
RED BLOOD CELLS URINE: ABNORMAL (ref 0–3)
SODIUM SERPL-SCNC: 140 MMOL/L (ref 136–146)
SPECIFIC GRAVITY URINE: 1.03 (ref 1–1.03)
SQUAMOUS EPITHELIAL CELLS: ABNORMAL
TIBC SERPL-MCNC: 327 UG/DL (ref 250–450)
URINE PROTEIN, QUAL (DIPSTICK): 30
UROBILINOGEN URINE: <2
WHITE BLOOD CELLS URINE: >100 (ref 0–5)

## 2020-07-03 PROCEDURE — 85014 HEMATOCRIT: CPT | Performed by: FAMILY MEDICINE

## 2020-07-03 PROCEDURE — 85018 HEMOGLOBIN: CPT | Performed by: FAMILY MEDICINE

## 2020-07-03 PROCEDURE — 82728 ASSAY OF FERRITIN: CPT | Performed by: FAMILY MEDICINE

## 2020-07-03 PROCEDURE — 87086 URINE CULTURE/COLONY COUNT: CPT | Performed by: FAMILY MEDICINE

## 2020-07-03 PROCEDURE — 80048 BASIC METABOLIC PNL TOTAL CA: CPT | Performed by: FAMILY MEDICINE

## 2020-07-03 PROCEDURE — 83970 ASSAY OF PARATHORMONE: CPT | Performed by: FAMILY MEDICINE

## 2020-07-03 PROCEDURE — 36415 COLL VENOUS BLD VENIPUNCTURE: CPT | Performed by: FAMILY MEDICINE

## 2020-07-03 PROCEDURE — 83550 IRON BINDING TEST: CPT | Performed by: FAMILY MEDICINE

## 2020-07-03 PROCEDURE — 87088 URINE BACTERIA CULTURE: CPT | Performed by: FAMILY MEDICINE

## 2020-07-03 PROCEDURE — 81003 URINALYSIS AUTO W/O SCOPE: CPT | Performed by: FAMILY MEDICINE

## 2020-07-03 PROCEDURE — 87186 SC STD MICRODIL/AGAR DIL: CPT | Performed by: FAMILY MEDICINE

## 2020-07-03 PROCEDURE — 83540 ASSAY OF IRON: CPT | Performed by: FAMILY MEDICINE

## 2020-07-03 PROCEDURE — 84100 ASSAY OF PHOSPHORUS: CPT | Performed by: FAMILY MEDICINE

## 2020-07-06 ENCOUNTER — OFFICE VISIT (OUTPATIENT)
Dept: INTERNAL MEDICINE | Age: 65
End: 2020-07-06

## 2020-07-06 DIAGNOSIS — N39.0 URINARY TRACT INFECTION WITHOUT HEMATURIA, SITE UNSPECIFIED: Primary | ICD-10-CM

## 2020-07-06 LAB — FINAL REPORT: ABNORMAL

## 2020-07-06 PROCEDURE — 99214 OFFICE O/P EST MOD 30 MIN: CPT | Performed by: FAMILY MEDICINE

## 2020-07-06 RX ORDER — LEVOFLOXACIN 250 MG/1
250 TABLET, FILM COATED ORAL DAILY
Qty: 7 TABLET | Refills: 0 | Status: SHIPPED | OUTPATIENT
Start: 2020-07-06 | End: 2020-07-13

## 2020-07-07 ENCOUNTER — APPOINTMENT (OUTPATIENT)
Dept: CARDIOLOGY | Age: 65
End: 2020-07-07
Attending: FAMILY MEDICINE

## 2020-07-10 ENCOUNTER — ANCILLARY PROCEDURE (OUTPATIENT)
Dept: CARDIOLOGY | Age: 65
End: 2020-07-10
Attending: INTERNAL MEDICINE

## 2020-07-10 DIAGNOSIS — G45.9 TIA (TRANSIENT ISCHEMIC ATTACK): ICD-10-CM

## 2020-07-10 PROCEDURE — G2066 INTER DEVC REMOTE 30D: HCPCS | Performed by: INTERNAL MEDICINE

## 2020-07-10 PROCEDURE — 93298 REM INTERROG DEV EVAL SCRMS: CPT | Performed by: INTERNAL MEDICINE

## 2020-07-15 ENCOUNTER — TELEPHONE (OUTPATIENT)
Dept: INTERNAL MEDICINE | Age: 65
End: 2020-07-15

## 2020-07-15 DIAGNOSIS — R68.83 CHILLS: ICD-10-CM

## 2020-07-15 DIAGNOSIS — R10.9 FLANK PAIN, ACUTE: Primary | ICD-10-CM

## 2020-07-15 DIAGNOSIS — Z87.440 HX: UTI (URINARY TRACT INFECTION): ICD-10-CM

## 2020-07-16 ENCOUNTER — LAB SERVICES (OUTPATIENT)
Dept: LAB | Age: 65
End: 2020-07-16

## 2020-07-16 DIAGNOSIS — R10.9 FLANK PAIN, ACUTE: ICD-10-CM

## 2020-07-16 DIAGNOSIS — R68.83 CHILLS: ICD-10-CM

## 2020-07-16 DIAGNOSIS — Z87.440 HX: UTI (URINARY TRACT INFECTION): ICD-10-CM

## 2020-07-16 LAB
BILIRUBIN URINE: NEGATIVE
BLOOD URINE: ABNORMAL
CLARITY: ABNORMAL
COLOR: YELLOW
GLUCOSE QUALITATIVE U: NEGATIVE
KETONES, URINE: NEGATIVE
LEUKOCYTE ESTERASE URINE: ABNORMAL
MUCOUS: ABNORMAL
NITRITE URINE: NEGATIVE
PH URINE: 5 (ref 5–7)
RED BLOOD CELLS URINE: ABNORMAL (ref 0–3)
SPECIFIC GRAVITY URINE: 1.03 (ref 1–1.03)
SQUAMOUS EPITHELIAL CELLS: ABNORMAL
URINE PROTEIN, QUAL (DIPSTICK): NEGATIVE
UROBILINOGEN URINE: <2
WHITE BLOOD CELLS URINE: ABNORMAL (ref 0–5)

## 2020-07-16 PROCEDURE — 87088 URINE BACTERIA CULTURE: CPT | Performed by: FAMILY MEDICINE

## 2020-07-16 PROCEDURE — 81003 URINALYSIS AUTO W/O SCOPE: CPT | Performed by: FAMILY MEDICINE

## 2020-07-16 PROCEDURE — 87186 SC STD MICRODIL/AGAR DIL: CPT | Performed by: FAMILY MEDICINE

## 2020-07-16 PROCEDURE — 87086 URINE CULTURE/COLONY COUNT: CPT | Performed by: FAMILY MEDICINE

## 2020-07-18 LAB — FINAL REPORT: ABNORMAL

## 2020-07-20 RX ORDER — LEVOFLOXACIN 500 MG/1
500 TABLET, FILM COATED ORAL DAILY
Qty: 7 TABLET | Refills: 0 | Status: SHIPPED | OUTPATIENT
Start: 2020-07-20 | End: 2020-07-27

## 2020-07-30 ENCOUNTER — OFFICE VISIT (OUTPATIENT)
Dept: NEPHROLOGY | Age: 65
End: 2020-07-30

## 2020-07-30 VITALS
DIASTOLIC BLOOD PRESSURE: 70 MMHG | SYSTOLIC BLOOD PRESSURE: 122 MMHG | HEIGHT: 65 IN | OXYGEN SATURATION: 97 % | BODY MASS INDEX: 22.8 KG/M2 | HEART RATE: 86 BPM

## 2020-07-30 DIAGNOSIS — D63.1 ANEMIA OF CHRONIC RENAL FAILURE, STAGE 3 (MODERATE) (CMD): ICD-10-CM

## 2020-07-30 DIAGNOSIS — I12.9 NEPHROSCLEROSIS ARTERIOLAR, STAGE 1-4 OR UNSPECIFIED CHRONIC KIDNEY DISEASE: ICD-10-CM

## 2020-07-30 DIAGNOSIS — N20.0 KIDNEY STONE: ICD-10-CM

## 2020-07-30 DIAGNOSIS — N18.30 CKD (CHRONIC KIDNEY DISEASE) STAGE 3, GFR 30-59 ML/MIN (CMD): Primary | ICD-10-CM

## 2020-07-30 DIAGNOSIS — N18.30 ANEMIA OF CHRONIC RENAL FAILURE, STAGE 3 (MODERATE) (CMD): ICD-10-CM

## 2020-07-30 DIAGNOSIS — I10 ESSENTIAL HYPERTENSION: ICD-10-CM

## 2020-07-30 PROCEDURE — 99214 OFFICE O/P EST MOD 30 MIN: CPT | Performed by: INTERNAL MEDICINE

## 2020-07-30 PROCEDURE — 3074F SYST BP LT 130 MM HG: CPT | Performed by: INTERNAL MEDICINE

## 2020-07-30 PROCEDURE — 3078F DIAST BP <80 MM HG: CPT | Performed by: INTERNAL MEDICINE

## 2020-08-12 ENCOUNTER — ANCILLARY PROCEDURE (OUTPATIENT)
Dept: CARDIOLOGY | Age: 65
End: 2020-08-12
Attending: INTERNAL MEDICINE

## 2020-08-12 DIAGNOSIS — G45.9 TIA (TRANSIENT ISCHEMIC ATTACK): ICD-10-CM

## 2020-08-12 PROCEDURE — 93298 REM INTERROG DEV EVAL SCRMS: CPT | Performed by: INTERNAL MEDICINE

## 2020-08-12 PROCEDURE — G2066 INTER DEVC REMOTE 30D: HCPCS | Performed by: INTERNAL MEDICINE

## 2020-08-14 DIAGNOSIS — G45.9 TIA (TRANSIENT ISCHEMIC ATTACK): ICD-10-CM

## 2020-08-14 DIAGNOSIS — N18.30 CKD (CHRONIC KIDNEY DISEASE) STAGE 3, GFR 30-59 ML/MIN (CMD): ICD-10-CM

## 2020-08-14 DIAGNOSIS — E78.2 MIXED HYPERLIPIDEMIA: Primary | ICD-10-CM

## 2020-08-14 DIAGNOSIS — I10 ESSENTIAL HYPERTENSION: ICD-10-CM

## 2020-08-17 RX ORDER — ATORVASTATIN CALCIUM 40 MG/1
40 TABLET, FILM COATED ORAL DAILY
Qty: 90 TABLET | Refills: 0 | Status: SHIPPED | OUTPATIENT
Start: 2020-08-17 | End: 2020-11-04

## 2020-09-10 ENCOUNTER — NURSE TRIAGE (OUTPATIENT)
Dept: INTERNAL MEDICINE | Age: 65
End: 2020-09-10

## 2020-09-10 ENCOUNTER — LAB SERVICES (OUTPATIENT)
Dept: LAB | Age: 65
End: 2020-09-10

## 2020-09-10 DIAGNOSIS — R35.0 FREQUENCY OF URINATION: ICD-10-CM

## 2020-09-10 DIAGNOSIS — R68.83 CHILLS: ICD-10-CM

## 2020-09-10 DIAGNOSIS — R35.0 FREQUENCY OF URINATION: Primary | ICD-10-CM

## 2020-09-10 LAB
BILIRUBIN URINE: NEGATIVE
BLOOD URINE: ABNORMAL
CLARITY: ABNORMAL
COLOR: YELLOW
GLUCOSE QUALITATIVE U: NEGATIVE
KETONES, URINE: NEGATIVE
LEUKOCYTE ESTERASE URINE: ABNORMAL
MUCOUS: NORMAL
NITRITE URINE: NEGATIVE
PH URINE: 5 (ref 5–7)
RED BLOOD CELLS URINE: NORMAL (ref 0–3)
SPECIFIC GRAVITY URINE: 1.03 (ref 1–1.03)
SQUAMOUS EPITHELIAL CELLS: NORMAL
URINE PROTEIN, QUAL (DIPSTICK): NEGATIVE
UROBILINOGEN URINE: <2
WHITE BLOOD CELLS URINE: NORMAL (ref 0–5)

## 2020-09-10 PROCEDURE — 87086 URINE CULTURE/COLONY COUNT: CPT | Performed by: FAMILY MEDICINE

## 2020-09-10 PROCEDURE — 81003 URINALYSIS AUTO W/O SCOPE: CPT | Performed by: FAMILY MEDICINE

## 2020-09-11 ENCOUNTER — V-VISIT (OUTPATIENT)
Dept: INTERNAL MEDICINE | Age: 65
End: 2020-09-11

## 2020-09-11 DIAGNOSIS — R35.0 INCREASED URINARY FREQUENCY: Primary | ICD-10-CM

## 2020-09-11 DIAGNOSIS — J30.89 ALLERGIC RHINITIS DUE TO OTHER ALLERGIC TRIGGER, UNSPECIFIED SEASONALITY: ICD-10-CM

## 2020-09-11 LAB — FINAL REPORT: NORMAL

## 2020-09-11 PROCEDURE — 99213 OFFICE O/P EST LOW 20 MIN: CPT | Performed by: FAMILY MEDICINE

## 2020-09-16 ENCOUNTER — ANCILLARY PROCEDURE (OUTPATIENT)
Dept: CARDIOLOGY | Age: 65
End: 2020-09-16
Attending: FAMILY MEDICINE

## 2020-09-16 DIAGNOSIS — G45.9 TIA (TRANSIENT ISCHEMIC ATTACK): ICD-10-CM

## 2020-09-16 DIAGNOSIS — M85.80 OSTEOPENIA, UNSPECIFIED LOCATION: ICD-10-CM

## 2020-09-16 PROCEDURE — G2066 INTER DEVC REMOTE 30D: HCPCS | Performed by: INTERNAL MEDICINE

## 2020-09-16 PROCEDURE — 93298 REM INTERROG DEV EVAL SCRMS: CPT | Performed by: INTERNAL MEDICINE

## 2020-10-17 PROCEDURE — 93298 REM INTERROG DEV EVAL SCRMS: CPT | Performed by: INTERNAL MEDICINE

## 2020-10-17 PROCEDURE — G2066 INTER DEVC REMOTE 30D: HCPCS | Performed by: INTERNAL MEDICINE

## 2020-10-19 ENCOUNTER — ANCILLARY PROCEDURE (OUTPATIENT)
Dept: CARDIOLOGY | Age: 65
End: 2020-10-19
Attending: INTERNAL MEDICINE

## 2020-10-19 DIAGNOSIS — G45.9 TIA (TRANSIENT ISCHEMIC ATTACK): ICD-10-CM

## 2020-11-03 ENCOUNTER — EXTERNAL RECORD (OUTPATIENT)
Dept: HEALTH INFORMATION MANAGEMENT | Facility: OTHER | Age: 65
End: 2020-11-03

## 2020-11-03 DIAGNOSIS — G45.9 TIA (TRANSIENT ISCHEMIC ATTACK): ICD-10-CM

## 2020-11-03 DIAGNOSIS — E78.2 MIXED HYPERLIPIDEMIA: ICD-10-CM

## 2020-11-03 DIAGNOSIS — I10 ESSENTIAL HYPERTENSION: ICD-10-CM

## 2020-11-03 DIAGNOSIS — N18.30 CKD (CHRONIC KIDNEY DISEASE) STAGE 3, GFR 30-59 ML/MIN (CMD): ICD-10-CM

## 2020-11-03 LAB
*MEAN CORPUSCULAR HGB CONC: 33.2 G/DL (ref 32.5–35.8)
A/G RATIO: 1.68 (ref 1.1–2.4)
ALANINE AMINOTRANSFE: 16 U/L (ref 7–52)
ALBUMIN, SERUM (ALB): 4.2 G/DL (ref 3.5–5.7)
ALKALINE PHOSPHATASE (ALK): 71 U/L (ref 34–104)
ANION GAP: 6 MEQ/L (ref 6.2–14.7)
APPEARANCE: CLEAR
ASPARTATE AMINOTRANS: 17 U/L (ref 13–39)
BASOPHIL AUTOMATED: 0.8 %
BASOPHILS: 0.1 (ref 0–0.2)
BILIRUBIN, TOTAL: 0.2 MG/DL (ref 0.2–0.8)
BILIRUBIN: ABNORMAL
BLOOD UREA NITROGEN (BUN): 25 MG/DL (ref 7–25)
BUN/CREATININE RATIO: 20.5 (ref 7.4–23)
CALCIUM, SERUM: 9.1 MG/DL (ref 8.6–10.3)
CARBON DIOXIDE: 21 MMOL/L (ref 21–31)
CHLORIDE, SERUM: 110 MMOL/L (ref 98–107)
CHRONIC KIDNEY DISEASE STAGE: ABNORMAL
COLOR: YELLOW
COVID-19 RAPID RESULT: NEGATIVE
COVID-19 RAPID SOURCE: NORMAL
CREATININE: 1.22 MG/DL (ref 0.6–1.2)
EOSINOPHIL AUTOMATED: 2.2 %
EOSINOPHILS: 0.2 (ref 0–0.5)
EST GLOMERULAR FILTRATION RATE: 44 ML/MIN
GLUCOSE, URINE, AUTOMATED: ABNORMAL MG/DL
GLUCOSE: 89 MG/DL (ref 70–99)
HEMATOCRIT: 38.2 % (ref 34.7–45.1)
HEMOGLOBIN: 12.7 GM/DL (ref 12–15.3)
K (POTASSIUM, SERUM): 3.8 MMOL/L (ref 3.5–5.1)
KETONES, URINE, AUTOMATED: ABNORMAL MG/DL
LEUKOCYTE, URINE, AUTOMATED: NEGATIVE
LIPASE: 58 U/L (ref 11–82)
LYMPHOCYTE AUTOMATED: 19.2 %
LYMPHOCYTES: 2 (ref 0.6–3.4)
MEAN CORPUSCULAR HGB: 33.8 PG (ref 26–34)
MEAN CORPUSCULAR VOL: 101.7 FL (ref 80–100)
MEAN PLATELET VOLUME: 7.5 FL (ref 6.8–10.2)
MONOCYTE AUTOMATED: 8.5 %
MONOCYTES: 0.9 (ref 0.3–1)
NA (SODIUM, SERUM): 137 MMOL/L (ref 133–144)
NEUTROPHIL ABSOLUTE: 7.3 (ref 1.7–7.7)
NEUTROPHIL AUTOMATED: 69.3 %
NITRITE, URINE AUTOMATED: NEGATIVE
PH, URINE: 5 (ref 5–8)
PLATELET COUNT: 325 K/MM3 (ref 150–450)
PROTEIN TOTAL: 6.7 G/DL (ref 6.4–8.9)
PROTEIN, URINE: ABNORMAL MG/DL
RBC: ABNORMAL /HPF (ref 0–2)
RED BLOOD CELL COUNT: 3.75 M/MM3 (ref 3.63–5.04)
RED CELL DISTRIBUTIO: 14.6 % (ref 11.9–15.9)
SPECIFIC GRAVITY UA: 1.03 (ref 1–1.03)
URINE, BLOOD, AUTOMATED: ABNORMAL
UROBILINOGEN, URINE, AUTOMATED: ABNORMAL MG/DL
WBC: ABNORMAL /HPF (ref 0–5)
WHITE BLOOD CELL COU: 10.5 K/MM3 (ref 4–11)

## 2020-11-03 PROCEDURE — 93010 ELECTROCARDIOGRAM REPORT: CPT | Performed by: INTERNAL MEDICINE

## 2020-11-04 ENCOUNTER — TELEPHONE (OUTPATIENT)
Dept: UROLOGY | Age: 65
End: 2020-11-04

## 2020-11-04 DIAGNOSIS — N20.1 CALCULUS OF URETER: Primary | ICD-10-CM

## 2020-11-04 RX ORDER — ATORVASTATIN CALCIUM 40 MG/1
40 TABLET, FILM COATED ORAL DAILY
Qty: 90 TABLET | Refills: 0 | Status: SHIPPED | OUTPATIENT
Start: 2020-11-04 | End: 2020-12-08 | Stop reason: DRUGHIGH

## 2020-11-05 ENCOUNTER — IMAGING SERVICES (OUTPATIENT)
Dept: GENERAL RADIOLOGY | Age: 65
End: 2020-11-05
Attending: UROLOGY

## 2020-11-05 ENCOUNTER — OFFICE VISIT (OUTPATIENT)
Dept: UROLOGY | Age: 65
End: 2020-11-05

## 2020-11-05 VITALS — WEIGHT: 130 LBS | BODY MASS INDEX: 20.89 KG/M2 | TEMPERATURE: 97.9 F | HEIGHT: 66 IN

## 2020-11-05 DIAGNOSIS — N20.1 CALCULUS OF URETER: ICD-10-CM

## 2020-11-05 DIAGNOSIS — N20.1 URETERAL STONE: Primary | ICD-10-CM

## 2020-11-05 PROCEDURE — 74018 RADEX ABDOMEN 1 VIEW: CPT | Performed by: RADIOLOGY

## 2020-11-05 PROCEDURE — 99204 OFFICE O/P NEW MOD 45 MIN: CPT | Performed by: UROLOGY

## 2020-11-05 SDOH — HEALTH STABILITY: MENTAL HEALTH: HOW OFTEN DO YOU HAVE A DRINK CONTAINING ALCOHOL?: NEVER

## 2020-11-06 ENCOUNTER — TELEPHONE (OUTPATIENT)
Dept: UROLOGY | Age: 65
End: 2020-11-06

## 2020-11-06 DIAGNOSIS — N20.1 URETERAL STONE: Primary | ICD-10-CM

## 2020-11-06 RX ORDER — ALFUZOSIN HYDROCHLORIDE 10 MG/1
10 TABLET, EXTENDED RELEASE ORAL DAILY
Qty: 30 TABLET | Refills: 1 | Status: SHIPPED | OUTPATIENT
Start: 2020-11-06 | End: 2020-12-07 | Stop reason: ALTCHOICE

## 2020-11-07 ENCOUNTER — EXTERNAL RECORD (OUTPATIENT)
Dept: HEALTH INFORMATION MANAGEMENT | Facility: OTHER | Age: 65
End: 2020-11-07

## 2020-11-07 LAB
*MEAN CORPUSCULAR HGB CONC: 33.2 G/DL (ref 32.5–35.8)
A/G RATIO: 1.71 (ref 1.1–2.4)
ALANINE AMINOTRANSFE: 12 U/L (ref 7–52)
ALBUMIN, SERUM (ALB): 4.1 G/DL (ref 3.5–5.7)
ALKALINE PHOSPHATASE (ALK): 72 U/L (ref 34–104)
ANION GAP: 6 MEQ/L (ref 6.2–14.7)
APPEARANCE: CLEAR
ASPARTATE AMINOTRANS: 14 U/L (ref 13–39)
BASOPHIL AUTOMATED: 0.9 %
BASOPHILS: 0.1 (ref 0–0.2)
BILIRUBIN, TOTAL: 0.2 MG/DL (ref 0.2–0.8)
BILIRUBIN: ABNORMAL
BLOOD UREA NITROGEN (BUN): 26 MG/DL (ref 7–25)
BUN/CREATININE RATIO: 14.4 (ref 7.4–23)
CALCIUM, SERUM: 9.1 MG/DL (ref 8.6–10.3)
CARBON DIOXIDE: 19 MMOL/L (ref 21–31)
CHLORIDE, SERUM: 114 MMOL/L (ref 98–107)
CHRONIC KIDNEY DISEASE STAGE: ABNORMAL
COLOR: YELLOW
CREATININE: 1.81 MG/DL (ref 0.6–1.2)
EOSINOPHIL AUTOMATED: 2.5 %
EOSINOPHILS: 0.3 (ref 0–0.5)
EST GLOMERULAR FILTRATION RATE: 28 ML/MIN
GLUCOSE, URINE, AUTOMATED: ABNORMAL MG/DL
GLUCOSE: 96 MG/DL (ref 70–99)
HEMATOCRIT: 35 % (ref 34.7–45.1)
HEMOGLOBIN: 11.6 GM/DL (ref 12–15.3)
K (POTASSIUM, SERUM): 3.9 MMOL/L (ref 3.5–5.1)
KETONES, URINE, AUTOMATED: ABNORMAL MG/DL
LEUKOCYTE, URINE, AUTOMATED: NEGATIVE
LYMPHOCYTE AUTOMATED: 12.8 %
LYMPHOCYTES: 1.7 (ref 0.6–3.4)
MEAN CORPUSCULAR HGB: 33.2 PG (ref 26–34)
MEAN CORPUSCULAR VOL: 100 FL (ref 80–100)
MEAN PLATELET VOLUME: 8.1 FL (ref 6.8–10.2)
MONOCYTE AUTOMATED: 8.9 %
MONOCYTES: 1.2 (ref 0.3–1)
NA (SODIUM, SERUM): 139 MMOL/L (ref 133–144)
NEUTROPHIL ABSOLUTE: 9.8 (ref 1.7–7.7)
NEUTROPHIL AUTOMATED: 74.9 %
NITRITE, URINE AUTOMATED: NEGATIVE
PH, URINE: 5 (ref 5–8)
PLATELET COUNT: 336 K/MM3 (ref 150–450)
PROTEIN TOTAL: 6.5 G/DL (ref 6.4–8.9)
PROTEIN, URINE: ABNORMAL MG/DL
RBC: ABNORMAL /HPF (ref 0–2)
RED BLOOD CELL COUNT: 3.5 M/MM3 (ref 3.63–5.04)
RED CELL DISTRIBUTIO: 15.1 % (ref 11.9–15.9)
SPECIFIC GRAVITY UA: 1.02 (ref 1–1.03)
URINE, BLOOD, AUTOMATED: ABNORMAL
UROBILINOGEN, URINE, AUTOMATED: ABNORMAL MG/DL
WHITE BLOOD CELL COU: 13.1 K/MM3 (ref 4–11)

## 2020-11-09 LAB
COVID-19 RAPID RESULT: NEGATIVE
COVID-19 RAPID SOURCE: NORMAL

## 2020-11-10 ENCOUNTER — OFFICE VISIT (OUTPATIENT)
Dept: INTERNAL MEDICINE | Age: 65
End: 2020-11-10

## 2020-11-10 ENCOUNTER — EXTERNAL RECORD (OUTPATIENT)
Dept: UROLOGY | Age: 65
End: 2020-11-10

## 2020-11-10 ENCOUNTER — APPOINTMENT (OUTPATIENT)
Dept: OTHER | Facility: PHYSICIAN GROUP | Age: 65
End: 2020-11-10
Attending: UROLOGY

## 2020-11-10 VITALS
HEIGHT: 66 IN | HEART RATE: 84 BPM | BODY MASS INDEX: 23.78 KG/M2 | DIASTOLIC BLOOD PRESSURE: 78 MMHG | SYSTOLIC BLOOD PRESSURE: 140 MMHG | TEMPERATURE: 98.2 F | WEIGHT: 148 LBS

## 2020-11-10 DIAGNOSIS — N13.30 HYDRONEPHROSIS OF LEFT KIDNEY: ICD-10-CM

## 2020-11-10 DIAGNOSIS — I25.10 CORONARY ARTERY DISEASE INVOLVING NATIVE HEART WITHOUT ANGINA PECTORIS, UNSPECIFIED VESSEL OR LESION TYPE: ICD-10-CM

## 2020-11-10 DIAGNOSIS — N18.31 STAGE 3A CHRONIC KIDNEY DISEASE (CMD): ICD-10-CM

## 2020-11-10 DIAGNOSIS — Z01.818 PRE-OPERATIVE EXAMINATION: Primary | ICD-10-CM

## 2020-11-10 DIAGNOSIS — I10 ESSENTIAL HYPERTENSION: ICD-10-CM

## 2020-11-10 DIAGNOSIS — N20.1 URETERAL CALCULI: ICD-10-CM

## 2020-11-10 PROCEDURE — 99214 OFFICE O/P EST MOD 30 MIN: CPT | Performed by: FAMILY MEDICINE

## 2020-11-10 PROCEDURE — 3078F DIAST BP <80 MM HG: CPT | Performed by: FAMILY MEDICINE

## 2020-11-10 PROCEDURE — 3077F SYST BP >= 140 MM HG: CPT | Performed by: FAMILY MEDICINE

## 2020-11-10 PROCEDURE — 52356 CYSTO/URETERO W/LITHOTRIPSY: CPT | Performed by: UROLOGY

## 2020-11-10 SDOH — HEALTH STABILITY: MENTAL HEALTH: HOW OFTEN DO YOU HAVE A DRINK CONTAINING ALCOHOL?: NEVER

## 2020-11-11 ENCOUNTER — TELEPHONE (OUTPATIENT)
Dept: UROLOGY | Age: 65
End: 2020-11-11

## 2020-11-11 LAB
COVID-19 RESULT: NOT DETECTED
COVID-19 SOURCE: NORMAL

## 2020-11-12 ENCOUNTER — NURSE ONLY (OUTPATIENT)
Dept: UROLOGY | Age: 65
End: 2020-11-12

## 2020-11-12 DIAGNOSIS — N20.1 URETERAL STONE: Primary | ICD-10-CM

## 2020-11-12 PROCEDURE — X1094 NO CHARGE VISIT: HCPCS | Performed by: UROLOGY

## 2020-11-13 ENCOUNTER — TELEPHONE (OUTPATIENT)
Dept: INTERNAL MEDICINE | Age: 65
End: 2020-11-13

## 2020-11-13 ENCOUNTER — E-ADVICE (OUTPATIENT)
Dept: UROLOGY | Age: 65
End: 2020-11-13

## 2020-11-13 DIAGNOSIS — N28.9 ABNORMAL RENAL FUNCTION: Primary | ICD-10-CM

## 2020-11-14 ENCOUNTER — LAB SERVICES (OUTPATIENT)
Dept: LAB | Age: 65
End: 2020-11-14

## 2020-11-14 DIAGNOSIS — N28.9 ABNORMAL RENAL FUNCTION: ICD-10-CM

## 2020-11-14 LAB
BUN SERPL-MCNC: 16 MG/DL (ref 7–20)
CALCIUM SERPL-MCNC: 9.5 MG/DL (ref 8.6–10.6)
CHLORIDE SERPL-SCNC: 110 MMOL/L (ref 96–107)
CO2 SERPL-SCNC: 21 MMOL/L (ref 22–32)
CREAT SERPL-MCNC: 1.4 MG/DL (ref 0.5–1.4)
GFR SERPL CREATININE-BSD FRML MDRD: 37 ML/MIN/{1.73M2}
GFR SERPL CREATININE-BSD FRML MDRD: 45 ML/MIN/{1.73M2}
GLUCOSE P FAST SERPL-MCNC: 94 MG/DL (ref 60–100)
POTASSIUM SERPL-SCNC: 4.4 MMOL/L (ref 3.5–5.3)
SODIUM SERPL-SCNC: 142 MMOL/L (ref 136–146)
STONE (CALCULI) COMPOSITION: NORMAL
STONE (CALCULI) DESCRIPTION: NORMAL
STONE (CALCULI) MASS: 95 MG
STONE (CALCULI) NUMBER: NORMAL
STONE (CALCULI) SIZE: NORMAL MM

## 2020-11-14 PROCEDURE — 80048 BASIC METABOLIC PNL TOTAL CA: CPT | Performed by: UROLOGY

## 2020-11-14 PROCEDURE — 36415 COLL VENOUS BLD VENIPUNCTURE: CPT | Performed by: UROLOGY

## 2020-11-18 DIAGNOSIS — N28.9 ABNORMAL RENAL FUNCTION: Primary | ICD-10-CM

## 2020-11-19 ENCOUNTER — ANCILLARY PROCEDURE (OUTPATIENT)
Dept: CARDIOLOGY | Age: 65
End: 2020-11-19
Attending: INTERNAL MEDICINE

## 2020-11-19 DIAGNOSIS — G45.9 TIA (TRANSIENT ISCHEMIC ATTACK): ICD-10-CM

## 2020-11-19 PROCEDURE — G2066 INTER DEVC REMOTE 30D: HCPCS | Performed by: INTERNAL MEDICINE

## 2020-11-19 PROCEDURE — 93298 REM INTERROG DEV EVAL SCRMS: CPT | Performed by: INTERNAL MEDICINE

## 2020-12-04 ENCOUNTER — LAB SERVICES (OUTPATIENT)
Dept: LAB | Age: 65
End: 2020-12-04

## 2020-12-04 DIAGNOSIS — N18.30 ANEMIA OF CHRONIC RENAL FAILURE, STAGE 3 (MODERATE) (CMD): ICD-10-CM

## 2020-12-04 DIAGNOSIS — G45.9 TIA (TRANSIENT ISCHEMIC ATTACK): ICD-10-CM

## 2020-12-04 DIAGNOSIS — N20.0 KIDNEY STONE: ICD-10-CM

## 2020-12-04 DIAGNOSIS — N18.30 CKD (CHRONIC KIDNEY DISEASE) STAGE 3, GFR 30-59 ML/MIN (CMD): ICD-10-CM

## 2020-12-04 DIAGNOSIS — E78.2 MIXED HYPERLIPIDEMIA: ICD-10-CM

## 2020-12-04 DIAGNOSIS — D63.1 ANEMIA OF CHRONIC RENAL FAILURE, STAGE 3 (MODERATE) (CMD): ICD-10-CM

## 2020-12-04 DIAGNOSIS — I10 ESSENTIAL HYPERTENSION: ICD-10-CM

## 2020-12-04 DIAGNOSIS — I12.9 NEPHROSCLEROSIS ARTERIOLAR, STAGE 1-4 OR UNSPECIFIED CHRONIC KIDNEY DISEASE: ICD-10-CM

## 2020-12-04 LAB
ALBUMIN SERPL-MCNC: 4 G/DL (ref 3.6–5.1)
ALP SERPL-CCNC: 100 U/L (ref 45–130)
ALT SERPL W/O P-5'-P-CCNC: 25 U/L (ref 4–38)
AST SERPL-CCNC: 32 U/L (ref 14–43)
BILIRUB SERPL-MCNC: 0.3 MG/DL (ref 0–1.3)
BUN SERPL-MCNC: 23 MG/DL (ref 7–20)
CALCIUM SERPL-MCNC: 9.4 MG/DL (ref 8.6–10.6)
CHLORIDE SERPL-SCNC: 114 MMOL/L (ref 96–107)
CHOLEST SERPL-MCNC: 203 MG/DL (ref 140–200)
CO2 SERPL-SCNC: 21 MMOL/L (ref 22–32)
CREAT SERPL-MCNC: 1.3 MG/DL (ref 0.5–1.4)
GFR SERPL CREATININE-BSD FRML MDRD: 41 ML/MIN/{1.73M2}
GFR SERPL CREATININE-BSD FRML MDRD: 49 ML/MIN/{1.73M2}
GLUCOSE P FAST SERPL-MCNC: 89 MG/DL (ref 60–100)
HDLC SERPL-MCNC: 47 MG/DL
HEMATOCRIT: 38.6 % (ref 34–45)
HEMOGLOBIN: 11.6 G/DL (ref 11.2–15.7)
LDLC SERPL CALC-MCNC: 114 MG/DL (ref 30–100)
POTASSIUM SERPL-SCNC: 4.9 MMOL/L (ref 3.5–5.3)
PROT SERPL-MCNC: 6.7 G/DL (ref 6.4–8.5)
SODIUM SERPL-SCNC: 142 MMOL/L (ref 136–146)
TRIGL SERPL-MCNC: 212 MG/DL (ref 0–200)

## 2020-12-04 PROCEDURE — 80053 COMPREHEN METABOLIC PANEL: CPT | Performed by: INTERNAL MEDICINE

## 2020-12-04 PROCEDURE — 85014 HEMATOCRIT: CPT | Performed by: INTERNAL MEDICINE

## 2020-12-04 PROCEDURE — 80061 LIPID PANEL: CPT | Performed by: INTERNAL MEDICINE

## 2020-12-04 PROCEDURE — 36415 COLL VENOUS BLD VENIPUNCTURE: CPT | Performed by: INTERNAL MEDICINE

## 2020-12-04 PROCEDURE — 85018 HEMOGLOBIN: CPT | Performed by: INTERNAL MEDICINE

## 2020-12-07 ENCOUNTER — OFFICE VISIT (OUTPATIENT)
Dept: UROLOGY | Age: 65
End: 2020-12-07

## 2020-12-07 ENCOUNTER — IMAGING SERVICES (OUTPATIENT)
Dept: GENERAL RADIOLOGY | Age: 65
End: 2020-12-07
Attending: UROLOGY

## 2020-12-07 VITALS — HEIGHT: 66 IN | BODY MASS INDEX: 23.46 KG/M2 | WEIGHT: 146 LBS | TEMPERATURE: 96.7 F

## 2020-12-07 DIAGNOSIS — N20.1 URETERAL STONE: Primary | ICD-10-CM

## 2020-12-07 DIAGNOSIS — N20.1 CALCULUS OF URETER: ICD-10-CM

## 2020-12-07 PROCEDURE — 99212 OFFICE O/P EST SF 10 MIN: CPT | Performed by: UROLOGY

## 2020-12-07 PROCEDURE — 74018 RADEX ABDOMEN 1 VIEW: CPT | Performed by: RADIOLOGY

## 2020-12-07 SDOH — HEALTH STABILITY: MENTAL HEALTH: HOW OFTEN DO YOU HAVE A DRINK CONTAINING ALCOHOL?: NEVER

## 2020-12-08 ENCOUNTER — TELEPHONE (OUTPATIENT)
Dept: NEPHROLOGY | Age: 65
End: 2020-12-08

## 2020-12-08 RX ORDER — ATORVASTATIN CALCIUM 80 MG/1
80 TABLET, FILM COATED ORAL DAILY
Qty: 90 TABLET | Refills: 1 | Status: SHIPPED | OUTPATIENT
Start: 2020-12-08 | End: 2021-04-20

## 2020-12-09 ENCOUNTER — LAB SERVICES (OUTPATIENT)
Dept: LAB | Age: 65
End: 2020-12-09

## 2020-12-09 ENCOUNTER — OFFICE VISIT (OUTPATIENT)
Dept: NEPHROLOGY | Age: 65
End: 2020-12-09

## 2020-12-09 VITALS
HEART RATE: 84 BPM | BODY MASS INDEX: 23.14 KG/M2 | WEIGHT: 144 LBS | SYSTOLIC BLOOD PRESSURE: 136 MMHG | DIASTOLIC BLOOD PRESSURE: 82 MMHG | HEIGHT: 66 IN

## 2020-12-09 DIAGNOSIS — N18.30 ANEMIA OF CHRONIC RENAL FAILURE, STAGE 3 (MODERATE), UNSPECIFIED WHETHER STAGE 3A OR 3B CKD (CMD): ICD-10-CM

## 2020-12-09 DIAGNOSIS — D63.1 ANEMIA OF CHRONIC RENAL FAILURE, STAGE 3 (MODERATE), UNSPECIFIED WHETHER STAGE 3A OR 3B CKD (CMD): ICD-10-CM

## 2020-12-09 DIAGNOSIS — I10 ESSENTIAL HYPERTENSION: ICD-10-CM

## 2020-12-09 DIAGNOSIS — N18.30 STAGE 3 CHRONIC KIDNEY DISEASE, UNSPECIFIED WHETHER STAGE 3A OR 3B CKD (CMD): ICD-10-CM

## 2020-12-09 DIAGNOSIS — N20.0 KIDNEY STONE: Primary | ICD-10-CM

## 2020-12-09 DIAGNOSIS — I70.1 RENAL ARTERY STENOSIS (CMD): ICD-10-CM

## 2020-12-09 PROCEDURE — 3079F DIAST BP 80-89 MM HG: CPT | Performed by: INTERNAL MEDICINE

## 2020-12-09 PROCEDURE — 3075F SYST BP GE 130 - 139MM HG: CPT | Performed by: INTERNAL MEDICINE

## 2020-12-09 PROCEDURE — 99214 OFFICE O/P EST MOD 30 MIN: CPT | Performed by: INTERNAL MEDICINE

## 2020-12-22 ENCOUNTER — ANCILLARY PROCEDURE (OUTPATIENT)
Dept: CARDIOLOGY | Age: 65
End: 2020-12-22
Attending: INTERNAL MEDICINE

## 2020-12-22 DIAGNOSIS — G45.9 TIA (TRANSIENT ISCHEMIC ATTACK): ICD-10-CM

## 2020-12-22 PROCEDURE — 93298 REM INTERROG DEV EVAL SCRMS: CPT | Performed by: INTERNAL MEDICINE

## 2020-12-22 PROCEDURE — G2066 INTER DEVC REMOTE 30D: HCPCS | Performed by: INTERNAL MEDICINE

## 2021-01-24 DIAGNOSIS — I71.40 ABDOMINAL AORTIC ANEURYSM WITHOUT RUPTURE (CMD): ICD-10-CM

## 2021-01-24 DIAGNOSIS — I25.119 CORONARY ARTERY DISEASE INVOLVING NATIVE CORONARY ARTERY OF NATIVE HEART WITH ANGINA PECTORIS (CMD): ICD-10-CM

## 2021-01-24 DIAGNOSIS — I10 ESSENTIAL HYPERTENSION: ICD-10-CM

## 2021-01-27 ENCOUNTER — ANCILLARY PROCEDURE (OUTPATIENT)
Dept: CARDIOLOGY | Age: 66
End: 2021-01-27
Attending: INTERNAL MEDICINE

## 2021-01-27 DIAGNOSIS — G45.9 TIA (TRANSIENT ISCHEMIC ATTACK): ICD-10-CM

## 2021-02-19 ENCOUNTER — V-VISIT (OUTPATIENT)
Dept: INTERNAL MEDICINE | Age: 66
End: 2021-02-19

## 2021-02-19 ENCOUNTER — NURSE TRIAGE (OUTPATIENT)
Dept: INTERNAL MEDICINE | Age: 66
End: 2021-02-19

## 2021-02-19 DIAGNOSIS — M54.2 CERVICAL PAIN (NECK): Primary | ICD-10-CM

## 2021-02-19 DIAGNOSIS — M54.12 CERVICAL RADICULOPATHY: ICD-10-CM

## 2021-02-19 DIAGNOSIS — G43.709 CHRONIC MIGRAINE WITHOUT AURA WITHOUT STATUS MIGRAINOSUS, NOT INTRACTABLE: ICD-10-CM

## 2021-02-19 PROCEDURE — 99214 OFFICE O/P EST MOD 30 MIN: CPT | Performed by: FAMILY MEDICINE

## 2021-02-19 RX ORDER — BACLOFEN 5 MG/1
5 TABLET ORAL 2 TIMES DAILY PRN
Qty: 20 TABLET | Refills: 0 | Status: SHIPPED | OUTPATIENT
Start: 2021-02-19 | End: 2021-03-18 | Stop reason: ALTCHOICE

## 2021-02-19 RX ORDER — PREDNISONE 10 MG/1
TABLET ORAL
Qty: 30 TABLET | Refills: 0 | Status: SHIPPED | OUTPATIENT
Start: 2021-02-19 | End: 2021-03-18 | Stop reason: ALTCHOICE

## 2021-03-01 ENCOUNTER — ANCILLARY PROCEDURE (OUTPATIENT)
Dept: CARDIOLOGY | Age: 66
End: 2021-03-01
Attending: INTERNAL MEDICINE

## 2021-03-01 DIAGNOSIS — G45.9 TIA (TRANSIENT ISCHEMIC ATTACK): ICD-10-CM

## 2021-03-01 PROCEDURE — 93298 REM INTERROG DEV EVAL SCRMS: CPT | Performed by: INTERNAL MEDICINE

## 2021-03-01 PROCEDURE — G2066 INTER DEVC REMOTE 30D: HCPCS | Performed by: INTERNAL MEDICINE

## 2021-03-02 ENCOUNTER — LAB SERVICES (OUTPATIENT)
Dept: LAB | Age: 66
End: 2021-03-02

## 2021-03-02 DIAGNOSIS — E78.1 HIGH TRIGLYCERIDES: Primary | ICD-10-CM

## 2021-03-02 DIAGNOSIS — I70.1 RENAL ARTERY STENOSIS (CMD): ICD-10-CM

## 2021-03-02 DIAGNOSIS — I10 ESSENTIAL HYPERTENSION: ICD-10-CM

## 2021-03-02 DIAGNOSIS — N20.0 KIDNEY STONE: ICD-10-CM

## 2021-03-02 DIAGNOSIS — E78.2 MIXED HYPERLIPIDEMIA: ICD-10-CM

## 2021-03-02 DIAGNOSIS — D63.1 ANEMIA OF CHRONIC RENAL FAILURE, STAGE 3 (MODERATE), UNSPECIFIED WHETHER STAGE 3A OR 3B CKD (CMD): ICD-10-CM

## 2021-03-02 DIAGNOSIS — G45.9 TIA (TRANSIENT ISCHEMIC ATTACK): ICD-10-CM

## 2021-03-02 DIAGNOSIS — N18.30 STAGE 3 CHRONIC KIDNEY DISEASE, UNSPECIFIED WHETHER STAGE 3A OR 3B CKD (CMD): ICD-10-CM

## 2021-03-02 DIAGNOSIS — N18.30 ANEMIA OF CHRONIC RENAL FAILURE, STAGE 3 (MODERATE), UNSPECIFIED WHETHER STAGE 3A OR 3B CKD (CMD): ICD-10-CM

## 2021-03-02 DIAGNOSIS — G45.9 TIA (TRANSIENT ISCHEMIC ATTACK): Primary | ICD-10-CM

## 2021-03-02 DIAGNOSIS — I25.10 CORONARY ARTERY DISEASE INVOLVING NATIVE HEART WITHOUT ANGINA PECTORIS, UNSPECIFIED VESSEL OR LESION TYPE: ICD-10-CM

## 2021-03-02 LAB
ALBUMIN SERPL-MCNC: 4.2 G/DL (ref 3.6–5.1)
ALP SERPL-CCNC: 81 U/L (ref 45–130)
ALT SERPL W/O P-5'-P-CCNC: 20 U/L (ref 4–38)
AST SERPL-CCNC: 22 U/L (ref 14–43)
BILIRUB SERPL-MCNC: 0.2 MG/DL (ref 0–1.3)
BUN SERPL-MCNC: 18 MG/DL (ref 7–20)
CALCIUM SERPL-MCNC: 9.5 MG/DL (ref 8.6–10.6)
CHLORIDE SERPL-SCNC: 109 MMOL/L (ref 96–107)
CHOLEST SERPL-MCNC: 208 MG/DL (ref 140–200)
CO2 SERPL-SCNC: 21 MMOL/L (ref 22–32)
CREAT SERPL-MCNC: 1.4 MG/DL (ref 0.5–1.4)
GFR SERPL CREATININE-BSD FRML MDRD: 37 ML/MIN/{1.73M2}
GFR SERPL CREATININE-BSD FRML MDRD: 45 ML/MIN/{1.73M2}
GLUCOSE P FAST SERPL-MCNC: 91 MG/DL (ref 60–100)
HDLC SERPL-MCNC: 53 MG/DL
HEMATOCRIT: 41.9 % (ref 34–45)
HEMOGLOBIN: 12.6 G/DL (ref 11.2–15.7)
LDLC SERPL DIRECT ASSAY-MCNC: 91 MG/DL (ref 30–100)
POTASSIUM SERPL-SCNC: 5 MMOL/L (ref 3.5–5.3)
PROT SERPL-MCNC: 6.7 G/DL (ref 6.4–8.5)
SODIUM SERPL-SCNC: 139 MMOL/L (ref 136–146)
TRIGL SERPL-MCNC: 318 MG/DL (ref 0–200)

## 2021-03-02 PROCEDURE — 85018 HEMOGLOBIN: CPT | Performed by: INTERNAL MEDICINE

## 2021-03-02 PROCEDURE — 36415 COLL VENOUS BLD VENIPUNCTURE: CPT | Performed by: FAMILY MEDICINE

## 2021-03-02 PROCEDURE — 83721 ASSAY OF BLOOD LIPOPROTEIN: CPT | Performed by: FAMILY MEDICINE

## 2021-03-02 PROCEDURE — 80053 COMPREHEN METABOLIC PANEL: CPT | Performed by: INTERNAL MEDICINE

## 2021-03-02 PROCEDURE — 80061 LIPID PANEL: CPT | Performed by: FAMILY MEDICINE

## 2021-03-02 PROCEDURE — 85014 HEMATOCRIT: CPT | Performed by: INTERNAL MEDICINE

## 2021-03-02 ASSESSMENT — PATIENT HEALTH QUESTIONNAIRE - PHQ9: CLINICAL INTERPRETATION OF PHQ2 SCORE: NO FURTHER SCREENING NEEDED

## 2021-03-04 ENCOUNTER — TELEPHONE (OUTPATIENT)
Dept: CARDIOLOGY | Age: 66
End: 2021-03-04

## 2021-03-05 ASSESSMENT — PATIENT HEALTH QUESTIONNAIRE - PHQ9
2. FEELING DOWN, DEPRESSED OR HOPELESS: NOT AT ALL
1. LITTLE INTEREST OR PLEASURE IN DOING THINGS: NOT AT ALL
CLINICAL INTERPRETATION OF PHQ2 SCORE: 0

## 2021-03-10 LAB
*MEAN CORPUSCULAR HGB CONC: 32.1 G/DL (ref 32.5–35.8)
A/G RATIO: 1.48 (ref 1.1–2.4)
ALANINE AMINOTRANSFE: 22 U/L (ref 7–52)
ALBUMIN, SERUM (ALB): 4.3 G/DL (ref 3.5–5.7)
ALKALINE PHOSPHATASE (ALK): 92 U/L (ref 34–104)
ANION GAP: 6 MEQ/L (ref 6.2–14.7)
APPEARANCE: CLEAR
ASPARTATE AMINOTRANS: 22 U/L (ref 13–39)
BACTERIA: ABNORMAL /HPF
BASOPHIL AUTOMATED: 0.6 %
BASOPHILS: 0.1 (ref 0–0.14)
BILIRUBIN, TOTAL: 0.3 MG/DL (ref 0.2–0.8)
BILIRUBIN: ABNORMAL
BLOOD UREA NITROGEN (BUN): 19 MG/DL (ref 7–25)
BUN/CREATININE RATIO: 17.4 (ref 7.4–23)
CALCIUM, SERUM: 9.4 MG/DL (ref 8.6–10.3)
CARBON DIOXIDE: 24 MEQ/L (ref 21–31)
CHLORIDE, SERUM: 109 MMOL/L (ref 98–107)
CHRONIC KIDNEY DISEASE STAGE: ABNORMAL
COLOR: YELLOW
COVID-19 RAPID RESULT: NEGATIVE
COVID-19 RAPID SOURCE: NORMAL
CREATINE KINASE: 75 U/L (ref 30–223)
CREATININE: 1.09 MG/DL (ref 0.6–1.2)
EOSINOPHIL AUTOMATED: 1.1 %
EOSINOPHILS: 0.1 (ref 0–0.6)
EST GLOMERULAR FILTRATION RATE: 50 ML/MIN
GLUCOSE, URINE, AUTOMATED: ABNORMAL MG/DL
GLUCOSE: 91 MG/DL (ref 70–99)
HEMATOCRIT: 39.7 % (ref 34.7–45.1)
HEMOGLOBIN: 12.7 GM/DL (ref 12–15.3)
K (POTASSIUM, SERUM): 3.9 MMOL/L (ref 3.5–5.2)
KETONES, URINE, AUTOMATED: ABNORMAL MG/DL
LEUKOCYTE, URINE, AUTOMATED: NEGATIVE
LYMPHOCYTE AUTOMATED: 17.3 %
LYMPHOCYTES: 1.8 (ref 0.78–3.73)
MEAN CORPUSCULAR HGB: 30.4 PG (ref 26–34)
MEAN CORPUSCULAR VOL: 94.7 FL (ref 80–100)
MEAN PLATELET VOLUME: 7.7 FL (ref 6.8–10.2)
MONOCYTE AUTOMATED: 6.1 %
MONOCYTES: 0.6 (ref 0.17–1)
NA (SODIUM, SERUM): 139 MMOL/L (ref 133–144)
NEUTROPHIL ABSOLUTE: 7.9 (ref 1.91–7.6)
NEUTROPHIL AUTOMATED: 74.9 %
NITRITE, URINE AUTOMATED: NEGATIVE
PH, URINE: 6 (ref 5–8)
PLATELET COUNT: 319 K/MM3 (ref 150–450)
PROTEIN TOTAL: 7.2 G/DL (ref 6.4–8.9)
PROTEIN, URINE: ABNORMAL MG/DL
RBC: ABNORMAL /HPF (ref 0–2)
RED BLOOD CELL COUNT: 4.19 M/MM3 (ref 3.63–5.04)
RED CELL DISTRIBUTIO: 14.1 % (ref 11.9–15.9)
SPECIFIC GRAVITY UA: 1.02 (ref 1–1.03)
SQUAMOUS EPITHELIAL: ABNORMAL /LPF
TROPONIN I HIGH SENSITIVITY: 3 PG/ML (ref 0–12)
URINE, BLOOD, AUTOMATED: ABNORMAL
UROBILINOGEN, URINE, AUTOMATED: ABNORMAL MG/DL
WBC: ABNORMAL /HPF (ref 0–5)
WHITE BLOOD CELL COU: 10.5 K/MM3 (ref 4–11)

## 2021-03-10 PROCEDURE — 93010 ELECTROCARDIOGRAM REPORT: CPT | Performed by: INTERNAL MEDICINE

## 2021-03-11 ENCOUNTER — EXTERNAL RECORD (OUTPATIENT)
Dept: OTHER | Age: 66
End: 2021-03-11

## 2021-03-11 ENCOUNTER — EXTERNAL RECORD (OUTPATIENT)
Dept: HEALTH INFORMATION MANAGEMENT | Facility: OTHER | Age: 66
End: 2021-03-11

## 2021-03-11 ENCOUNTER — EXTERNAL LAB (OUTPATIENT)
Dept: OTHER | Age: 66
End: 2021-03-11

## 2021-03-11 LAB
*MEAN CORPUSCULAR HGB CONC: 32 G/DL (ref 32.5–35.8)
ANION GAP: 8 MEQ/L (ref 6.2–14.7)
BASOPHIL AUTOMATED: 1.5 %
BASOPHILS: 0.1 (ref 0–0.14)
BEDSIDE GLUCOSE: 92 MG/DL (ref 70–110)
BLOOD UREA NITROGEN (BUN): 13 MG/DL (ref 7–25)
BUN/CREATININE RATIO: 13.1 (ref 7.4–23)
CALCIUM, SERUM: 8.7 MG/DL (ref 8.6–10.3)
CARBON DIOXIDE: 20 MEQ/L (ref 21–31)
CHLORIDE, SERUM: 114 MMOL/L (ref 98–107)
CHRONIC KIDNEY DISEASE STAGE: ABNORMAL
CREATININE: 0.99 MG/DL (ref 0.6–1.2)
EOSINOPHIL AUTOMATED: 3.1 %
EOSINOPHILS: 0.3 (ref 0–0.6)
EST GLOMERULAR FILTRATION RATE: 56 ML/MIN
GLUCOSE: 85 MG/DL (ref 70–99)
HEMATOCRIT: 36.5 % (ref 34.7–45.1)
HEMOGLOBIN: 11.7 GM/DL (ref 12–15.3)
K (POTASSIUM, SERUM): 3.9 MMOL/L (ref 3.5–5.2)
LAB RESULT: NORMAL
LYMPHOCYTE AUTOMATED: 20.2 %
LYMPHOCYTES: 1.7 (ref 0.78–3.73)
MEAN CORPUSCULAR HGB: 30.2 PG (ref 26–34)
MEAN CORPUSCULAR VOL: 94.3 FL (ref 80–100)
MEAN PLATELET VOLUME: 7.8 FL (ref 6.8–10.2)
MONOCYTE AUTOMATED: 8.3 %
MONOCYTES: 0.7 (ref 0.17–1)
NA (SODIUM, SERUM): 142 MMOL/L (ref 133–144)
NEUTROPHIL ABSOLUTE: 5.7 (ref 1.91–7.6)
NEUTROPHIL AUTOMATED: 66.9 %
PLATELET COUNT: 280 K/MM3 (ref 150–450)
RED BLOOD CELL COUNT: 3.87 M/MM3 (ref 3.63–5.04)
RED CELL DISTRIBUTIO: 13.9 % (ref 11.9–15.9)
WHITE BLOOD CELL COU: 8.6 K/MM3 (ref 4–11)

## 2021-03-11 PROCEDURE — 99236 HOSP IP/OBS SAME DATE HI 85: CPT | Performed by: HOSPITALIST

## 2021-03-13 DIAGNOSIS — Z23 NEED FOR VACCINATION: ICD-10-CM

## 2021-03-15 ENCOUNTER — TELEPHONE (OUTPATIENT)
Dept: CARDIOLOGY | Age: 66
End: 2021-03-15

## 2021-03-18 ENCOUNTER — OFFICE VISIT (OUTPATIENT)
Dept: INTERNAL MEDICINE | Age: 66
End: 2021-03-18

## 2021-03-18 VITALS
SYSTOLIC BLOOD PRESSURE: 120 MMHG | HEIGHT: 66 IN | OXYGEN SATURATION: 97 % | TEMPERATURE: 96.9 F | BODY MASS INDEX: 23.3 KG/M2 | HEART RATE: 83 BPM | WEIGHT: 145 LBS | DIASTOLIC BLOOD PRESSURE: 80 MMHG

## 2021-03-18 DIAGNOSIS — I10 ESSENTIAL HYPERTENSION: ICD-10-CM

## 2021-03-18 DIAGNOSIS — Z72.0 TOBACCO USE: ICD-10-CM

## 2021-03-18 DIAGNOSIS — E78.2 MIXED HYPERLIPIDEMIA: ICD-10-CM

## 2021-03-18 DIAGNOSIS — N18.30 STAGE 3 CHRONIC KIDNEY DISEASE, UNSPECIFIED WHETHER STAGE 3A OR 3B CKD (CMD): ICD-10-CM

## 2021-03-18 DIAGNOSIS — M16.32 OSTEOARTHRITIS RESULTING FROM LEFT HIP DYSPLASIA: ICD-10-CM

## 2021-03-18 DIAGNOSIS — G43.809 OTHER MIGRAINE WITHOUT STATUS MIGRAINOSUS, NOT INTRACTABLE: ICD-10-CM

## 2021-03-18 DIAGNOSIS — M51.37 DEGENERATION OF LUMBAR OR LUMBOSACRAL INTERVERTEBRAL DISC: ICD-10-CM

## 2021-03-18 DIAGNOSIS — Z00.00 PE (PHYSICAL EXAM), ROUTINE: Primary | ICD-10-CM

## 2021-03-18 DIAGNOSIS — Z98.890 S/P AAA REPAIR: ICD-10-CM

## 2021-03-18 DIAGNOSIS — R42 VERTIGO: ICD-10-CM

## 2021-03-18 DIAGNOSIS — Z86.79 S/P AAA REPAIR: ICD-10-CM

## 2021-03-18 PROCEDURE — 99214 OFFICE O/P EST MOD 30 MIN: CPT | Performed by: FAMILY MEDICINE

## 2021-03-18 PROCEDURE — 3074F SYST BP LT 130 MM HG: CPT | Performed by: FAMILY MEDICINE

## 2021-03-18 PROCEDURE — G0439 PPPS, SUBSEQ VISIT: HCPCS | Performed by: FAMILY MEDICINE

## 2021-03-18 PROCEDURE — 3079F DIAST BP 80-89 MM HG: CPT | Performed by: FAMILY MEDICINE

## 2021-03-18 ASSESSMENT — ACTIVITIES OF DAILY LIVING (ADL)
RECENT_DECLINE_ADL: NO
ADL_BEFORE_ADMISSION: INDEPENDENT
ADL_SCORE: 12
ADL_SHORT_OF_BREATH: NO
NEEDS_ASSIST: NO

## 2021-03-18 ASSESSMENT — COGNITIVE AND FUNCTIONAL STATUS - GENERAL
ARE YOU BLIND OR DO YOU HAVE SERIOUS DIFFICULTY SEEING, EVEN WHEN WEARING GLASSES: NO
ARE YOU DEAF OR DO YOU HAVE SERIOUS DIFFICULTY  HEARING: NO

## 2021-03-19 ENCOUNTER — E-ADVICE (OUTPATIENT)
Dept: CARDIOLOGY | Age: 66
End: 2021-03-19

## 2021-04-12 ENCOUNTER — TELEPHONE (OUTPATIENT)
Dept: CARDIOLOGY | Age: 66
End: 2021-04-12

## 2021-04-13 ENCOUNTER — OFFICE VISIT (OUTPATIENT)
Dept: NEPHROLOGY | Age: 66
End: 2021-04-13

## 2021-04-13 VITALS
HEART RATE: 89 BPM | SYSTOLIC BLOOD PRESSURE: 125 MMHG | DIASTOLIC BLOOD PRESSURE: 70 MMHG | BODY MASS INDEX: 24.37 KG/M2 | WEIGHT: 151 LBS

## 2021-04-13 DIAGNOSIS — N18.31 ANEMIA OF CHRONIC RENAL FAILURE, STAGE 3A (CMD): ICD-10-CM

## 2021-04-13 DIAGNOSIS — N20.0 KIDNEY STONE: ICD-10-CM

## 2021-04-13 DIAGNOSIS — N18.31 STAGE 3A CHRONIC KIDNEY DISEASE (CMD): Primary | ICD-10-CM

## 2021-04-13 DIAGNOSIS — I12.9 NEPHROSCLEROSIS ARTERIOLAR, STAGE 1-4 OR UNSPECIFIED CHRONIC KIDNEY DISEASE: ICD-10-CM

## 2021-04-13 DIAGNOSIS — I10 ESSENTIAL HYPERTENSION: ICD-10-CM

## 2021-04-13 DIAGNOSIS — D63.1 ANEMIA OF CHRONIC RENAL FAILURE, STAGE 3A (CMD): ICD-10-CM

## 2021-04-13 PROCEDURE — 99214 OFFICE O/P EST MOD 30 MIN: CPT | Performed by: INTERNAL MEDICINE

## 2021-04-13 PROCEDURE — 3078F DIAST BP <80 MM HG: CPT | Performed by: INTERNAL MEDICINE

## 2021-04-13 PROCEDURE — 3074F SYST BP LT 130 MM HG: CPT | Performed by: INTERNAL MEDICINE

## 2021-04-20 RX ORDER — ATORVASTATIN CALCIUM 80 MG/1
80 TABLET, FILM COATED ORAL DAILY
Qty: 90 TABLET | Refills: 3 | Status: SHIPPED | OUTPATIENT
Start: 2021-04-20 | End: 2022-03-22

## 2021-05-14 PROBLEM — M16.12 PRIMARY OSTEOARTHRITIS OF LEFT HIP: Status: ACTIVE | Noted: 2018-10-23

## 2021-05-17 ENCOUNTER — TELEPHONE (OUTPATIENT)
Dept: INTERNAL MEDICINE | Age: 66
End: 2021-05-17

## 2021-05-25 VITALS
DIASTOLIC BLOOD PRESSURE: 66 MMHG | HEART RATE: 84 BPM | SYSTOLIC BLOOD PRESSURE: 128 MMHG | DIASTOLIC BLOOD PRESSURE: 70 MMHG | RESPIRATION RATE: 28 BRPM | TEMPERATURE: 101.8 F | OXYGEN SATURATION: 96 % | OXYGEN SATURATION: 100 % | TEMPERATURE: 96.3 F | SYSTOLIC BLOOD PRESSURE: 112 MMHG | RESPIRATION RATE: 20 BRPM | HEART RATE: 116 BPM

## 2021-05-25 RX ORDER — ACETAMINOPHEN 500 MG
1000 TABLET ORAL ONCE
Status: CANCELLED | OUTPATIENT
Start: 2021-05-25 | End: 2021-05-25

## 2021-05-25 RX ORDER — GARLIC EXTRACT 500 MG
1 CAPSULE ORAL DAILY
COMMUNITY

## 2021-05-26 ENCOUNTER — TELEPHONE (OUTPATIENT)
Dept: CARDIOLOGY | Age: 66
End: 2021-05-26

## 2021-06-10 ENCOUNTER — IMAGING SERVICES (OUTPATIENT)
Dept: GENERAL RADIOLOGY | Age: 66
End: 2021-06-10
Attending: UROLOGY

## 2021-06-10 ENCOUNTER — OFFICE VISIT (OUTPATIENT)
Dept: UROLOGY | Age: 66
End: 2021-06-10

## 2021-06-10 VITALS — BODY MASS INDEX: 23.32 KG/M2 | HEIGHT: 65 IN | TEMPERATURE: 97.4 F | WEIGHT: 140 LBS

## 2021-06-10 DIAGNOSIS — N20.1 URETERAL STONE: ICD-10-CM

## 2021-06-10 DIAGNOSIS — N20.1 URETERAL STONE: Primary | ICD-10-CM

## 2021-06-10 PROCEDURE — 99213 OFFICE O/P EST LOW 20 MIN: CPT | Performed by: UROLOGY

## 2021-06-10 PROCEDURE — 74018 RADEX ABDOMEN 1 VIEW: CPT | Performed by: RADIOLOGY

## 2021-06-10 RX ORDER — GARLIC EXTRACT 500 MG
1 CAPSULE ORAL
COMMUNITY
End: 2022-11-05

## 2021-06-14 ENCOUNTER — HOSPITAL ENCOUNTER (OUTPATIENT)
Dept: LAB | Facility: HOSPITAL | Age: 66
Discharge: HOME | End: 2021-06-14
Payer: MEDICARE

## 2021-06-14 ENCOUNTER — HOSPITAL ENCOUNTER (OUTPATIENT)
Dept: CV DIAGNOSTICS | Facility: HOSPITAL | Age: 66
Discharge: HOME | End: 2021-06-14
Payer: MEDICARE

## 2021-06-14 DIAGNOSIS — M16.32 OSTEOARTHRITIS RESULTING FROM LEFT HIP DYSPLASIA: ICD-10-CM

## 2021-06-14 PROCEDURE — 85025 COMPLETE CBC W/AUTO DIFF WBC: CPT

## 2021-06-14 PROCEDURE — 87081 CULTURE SCREEN ONLY: CPT | Performed by: ORTHOPAEDIC SURGERY

## 2021-06-14 PROCEDURE — 86850 RBC ANTIBODY SCREEN: CPT

## 2021-06-14 PROCEDURE — 86900 BLOOD TYPING SEROLOGIC ABO: CPT

## 2021-06-14 PROCEDURE — 86901 BLOOD TYPING SEROLOGIC RH(D): CPT

## 2021-06-14 PROCEDURE — 36415 COLL VENOUS BLD VENIPUNCTURE: CPT

## 2021-06-14 PROCEDURE — 93010 ELECTROCARDIOGRAM REPORT: CPT | Performed by: INTERNAL MEDICINE

## 2021-06-14 PROCEDURE — 93005 ELECTROCARDIOGRAM TRACING: CPT

## 2021-06-14 PROCEDURE — 80048 BASIC METABOLIC PNL TOTAL CA: CPT

## 2021-06-16 ENCOUNTER — LAB SERVICES (OUTPATIENT)
Dept: LAB | Age: 66
End: 2021-06-16

## 2021-06-16 DIAGNOSIS — Z01.818 PRE-OP EVALUATION: ICD-10-CM

## 2021-06-16 DIAGNOSIS — Z01.818 PRE-OP EVALUATION: Primary | ICD-10-CM

## 2021-06-16 LAB
ALBUMIN SERPL-MCNC: 4.2 G/DL (ref 3.6–5.1)
ALP SERPL-CCNC: 95 U/L (ref 45–130)
ALT SERPL W/O P-5'-P-CCNC: 25 U/L (ref 4–38)
AST SERPL-CCNC: 32 U/L (ref 14–43)
BASOPHIL %: 0.6 % (ref 0–1.2)
BASOPHIL ABSOLUTE #: 0.1 10*3/UL (ref 0–0.1)
BILIRUB SERPL-MCNC: 0.3 MG/DL (ref 0–1.3)
BUN SERPL-MCNC: 21 MG/DL (ref 7–20)
CALCIUM SERPL-MCNC: 9.8 MG/DL (ref 8.6–10.6)
CHLORIDE SERPL-SCNC: 113 MMOL/L (ref 96–107)
CO2 SERPL-SCNC: 20 MMOL/L (ref 22–32)
CREAT SERPL-MCNC: 1.3 MG/DL (ref 0.5–1.4)
DIFFERENTIAL TYPE: ABNORMAL
EOSINOPHIL %: 2.3 % (ref 0–10)
EOSINOPHIL ABSOLUTE #: 0.3 10*3/UL (ref 0–0.5)
GFR SERPL CREATININE-BSD FRML MDRD: 41 ML/MIN/{1.73M2}
GFR SERPL CREATININE-BSD FRML MDRD: 49 ML/MIN/{1.73M2}
GLUCOSE P FAST SERPL-MCNC: 87 MG/DL (ref 60–100)
HEMATOCRIT: 42 % (ref 34–45)
HEMOGLOBIN: 12.9 G/DL (ref 11.2–15.7)
IMMATURE GRANULOCYTE ABSOLUTE: 0.03 10*3/UL (ref 0–0.05)
IMMATURE GRANULOCYTE PERCENT: 0.3 % (ref 0–0.5)
LYMPH PERCENT: 16 % (ref 20.5–51.1)
LYMPHOCYTE ABSOLUTE #: 1.9 10*3/UL (ref 1.2–3.4)
MEAN CORPUSCULAR HGB CONCENTRATION: 30.7 % (ref 32–36)
MEAN CORPUSCULAR HGB: 30.3 PG (ref 27–34)
MEAN CORPUSCULAR VOLUME: 98.6 FL (ref 79–95)
MEAN PLATELET VOLUME: 9.8 FL (ref 8.6–12.4)
MONOCYTE ABSOLUTE #: 1.1 10*3/UL (ref 0.2–0.9)
MONOCYTE PERCENT: 9 % (ref 4.3–12.9)
NEUTROPHIL ABSOLUTE #: 8.5 10*3/UL (ref 1.4–6.5)
NEUTROPHIL PERCENT: 71.8 % (ref 34–73.5)
PLATELET COUNT: 341 10*3/UL (ref 150–400)
POTASSIUM SERPL-SCNC: 5.4 MMOL/L (ref 3.5–5.3)
PROT SERPL-MCNC: 6.7 G/DL (ref 6.4–8.5)
RED BLOOD CELL COUNT: 4.26 10*6/UL (ref 3.7–5.2)
RED CELL DISTRIBUTION WIDTH: 15.8 % (ref 11.3–14.8)
SODIUM SERPL-SCNC: 142 MMOL/L (ref 136–146)
WHITE BLOOD CELL COUNT: 11.8 10*3/UL (ref 4–10)

## 2021-06-16 PROCEDURE — 85025 COMPLETE CBC W/AUTO DIFF WBC: CPT | Performed by: FAMILY MEDICINE

## 2021-06-16 PROCEDURE — 80053 COMPREHEN METABOLIC PANEL: CPT | Performed by: FAMILY MEDICINE

## 2021-06-16 PROCEDURE — 36415 COLL VENOUS BLD VENIPUNCTURE: CPT | Performed by: FAMILY MEDICINE

## 2021-06-17 ENCOUNTER — LAB SERVICES (OUTPATIENT)
Dept: LAB | Age: 66
End: 2021-06-17

## 2021-06-17 ENCOUNTER — OFFICE VISIT (OUTPATIENT)
Dept: INTERNAL MEDICINE | Age: 66
End: 2021-06-17

## 2021-06-17 ENCOUNTER — TELEPHONE (OUTPATIENT)
Dept: INTERNAL MEDICINE | Age: 66
End: 2021-06-17

## 2021-06-17 VITALS
WEIGHT: 144 LBS | HEIGHT: 65 IN | HEART RATE: 71 BPM | DIASTOLIC BLOOD PRESSURE: 84 MMHG | SYSTOLIC BLOOD PRESSURE: 134 MMHG | OXYGEN SATURATION: 97 % | BODY MASS INDEX: 23.99 KG/M2

## 2021-06-17 DIAGNOSIS — Z72.0 TOBACCO USE: ICD-10-CM

## 2021-06-17 DIAGNOSIS — N18.31 STAGE 3A CHRONIC KIDNEY DISEASE (CMD): ICD-10-CM

## 2021-06-17 DIAGNOSIS — E78.2 MIXED HYPERLIPIDEMIA: ICD-10-CM

## 2021-06-17 DIAGNOSIS — I10 ESSENTIAL HYPERTENSION: ICD-10-CM

## 2021-06-17 DIAGNOSIS — M16.12 PRIMARY OSTEOARTHRITIS OF LEFT HIP: ICD-10-CM

## 2021-06-17 DIAGNOSIS — E87.5 SERUM POTASSIUM ELEVATED: ICD-10-CM

## 2021-06-17 DIAGNOSIS — Z01.818 PRE-OPERATIVE EXAMINATION: Primary | ICD-10-CM

## 2021-06-17 DIAGNOSIS — G43.809 OTHER MIGRAINE WITHOUT STATUS MIGRAINOSUS, NOT INTRACTABLE: ICD-10-CM

## 2021-06-17 DIAGNOSIS — Z01.818 PRE-OPERATIVE EXAMINATION: ICD-10-CM

## 2021-06-17 DIAGNOSIS — D72.828 OTHER ELEVATED WHITE BLOOD CELL (WBC) COUNT: Primary | ICD-10-CM

## 2021-06-17 PROBLEM — Q21.10 ASD (ATRIAL SEPTAL DEFECT): Status: RESOLVED | Noted: 2017-09-01 | Resolved: 2021-06-17

## 2021-06-17 LAB
BILIRUBIN URINE: NEGATIVE
BLOOD URINE: ABNORMAL
CLARITY: CLEAR
COLOR: YELLOW
GLUCOSE QUALITATIVE U: NEGATIVE
KETONES, URINE: NEGATIVE
LEUKOCYTE ESTERASE URINE: NEGATIVE
MUCOUS: NORMAL
NITRITE URINE: NEGATIVE
PH URINE: 5 (ref 5–7)
RED BLOOD CELLS URINE: NORMAL (ref 0–3)
SPECIFIC GRAVITY URINE: 1.03 (ref 1–1.03)
SQUAMOUS EPITHELIAL CELLS: NORMAL
URINE PROTEIN, QUAL (DIPSTICK): NEGATIVE
UROBILINOGEN URINE: <2
WHITE BLOOD CELLS URINE: NORMAL (ref 0–5)

## 2021-06-17 PROCEDURE — 99214 OFFICE O/P EST MOD 30 MIN: CPT | Performed by: FAMILY MEDICINE

## 2021-06-17 PROCEDURE — 81003 URINALYSIS AUTO W/O SCOPE: CPT | Performed by: FAMILY MEDICINE

## 2021-06-17 PROCEDURE — 3075F SYST BP GE 130 - 139MM HG: CPT | Performed by: FAMILY MEDICINE

## 2021-06-17 PROCEDURE — 3079F DIAST BP 80-89 MM HG: CPT | Performed by: FAMILY MEDICINE

## 2021-06-21 ENCOUNTER — LAB SERVICES (OUTPATIENT)
Dept: LAB | Age: 66
End: 2021-06-21

## 2021-06-21 DIAGNOSIS — D72.828 OTHER ELEVATED WHITE BLOOD CELL (WBC) COUNT: ICD-10-CM

## 2021-06-21 LAB
BASOPHIL %: 0.8 % (ref 0–1.2)
BASOPHIL ABSOLUTE #: 0.1 10*3/UL (ref 0–0.1)
DIFFERENTIAL TYPE: ABNORMAL
EOSINOPHIL %: 3.2 % (ref 0–10)
EOSINOPHIL ABSOLUTE #: 0.3 10*3/UL (ref 0–0.5)
HEMATOCRIT: 39.9 % (ref 34–45)
HEMOGLOBIN: 12.6 G/DL (ref 11.2–15.7)
IMMATURE GRANULOCYTE ABSOLUTE: 0.02 10*3/UL (ref 0–0.05)
IMMATURE GRANULOCYTE PERCENT: 0.2 % (ref 0–0.5)
LYMPH PERCENT: 24.2 % (ref 20.5–51.1)
LYMPHOCYTE ABSOLUTE #: 2.2 10*3/UL (ref 1.2–3.4)
MEAN CORPUSCULAR HGB CONCENTRATION: 31.6 % (ref 32–36)
MEAN CORPUSCULAR HGB: 31.2 PG (ref 27–34)
MEAN CORPUSCULAR VOLUME: 98.8 FL (ref 79–95)
MEAN PLATELET VOLUME: 10.1 FL (ref 8.6–12.4)
MONOCYTE ABSOLUTE #: 0.8 10*3/UL (ref 0.2–0.9)
MONOCYTE PERCENT: 8.8 % (ref 4.3–12.9)
NEUTROPHIL ABSOLUTE #: 5.8 10*3/UL (ref 1.4–6.5)
NEUTROPHIL PERCENT: 62.8 % (ref 34–73.5)
PLATELET COUNT: 363 10*3/UL (ref 150–400)
RED BLOOD CELL COUNT: 4.04 10*6/UL (ref 3.7–5.2)
RED CELL DISTRIBUTION WIDTH: 15.8 % (ref 11.3–14.8)
WHITE BLOOD CELL COUNT: 9.3 10*3/UL (ref 4–10)

## 2021-06-21 PROCEDURE — 85025 COMPLETE CBC W/AUTO DIFF WBC: CPT | Performed by: FAMILY MEDICINE

## 2021-06-21 PROCEDURE — 36415 COLL VENOUS BLD VENIPUNCTURE: CPT | Performed by: FAMILY MEDICINE

## 2021-06-25 PROBLEM — M16.12 PRIMARY OSTEOARTHRITIS OF LEFT HIP: Status: ACTIVE | Noted: 2021-06-25

## 2021-06-28 ENCOUNTER — LAB ENCOUNTER (OUTPATIENT)
Dept: LAB | Facility: HOSPITAL | Age: 66
End: 2021-06-28
Attending: ORTHOPAEDIC SURGERY
Payer: MEDICARE

## 2021-06-28 DIAGNOSIS — M16.32 OSTEOARTHRITIS RESULTING FROM LEFT HIP DYSPLASIA: ICD-10-CM

## 2021-06-29 LAB — SARS-COV-2 RNA RESP QL NAA+PROBE: NOT DETECTED

## 2021-07-01 ENCOUNTER — ANESTHESIA (OUTPATIENT)
Dept: SURGERY | Facility: HOSPITAL | Age: 66
End: 2021-07-01
Payer: MEDICARE

## 2021-07-01 ENCOUNTER — HOSPITAL ENCOUNTER (OUTPATIENT)
Facility: HOSPITAL | Age: 66
Discharge: HOME HEALTH | End: 2021-07-02
Attending: ORTHOPAEDIC SURGERY | Admitting: ORTHOPAEDIC SURGERY
Payer: MEDICARE

## 2021-07-01 ENCOUNTER — APPOINTMENT (OUTPATIENT)
Dept: GENERAL RADIOLOGY | Facility: HOSPITAL | Age: 66
End: 2021-07-01
Attending: PHYSICIAN ASSISTANT
Payer: MEDICARE

## 2021-07-01 ENCOUNTER — ANESTHESIA EVENT (OUTPATIENT)
Dept: SURGERY | Facility: HOSPITAL | Age: 66
End: 2021-07-01
Payer: MEDICARE

## 2021-07-01 DIAGNOSIS — M47.816 LUMBAR SPONDYLOSIS: ICD-10-CM

## 2021-07-01 DIAGNOSIS — M48.061 LUMBAR SPINAL STENOSIS: ICD-10-CM

## 2021-07-01 DIAGNOSIS — M16.32 OSTEOARTHRITIS RESULTING FROM LEFT HIP DYSPLASIA: Primary | ICD-10-CM

## 2021-07-01 LAB — CREAT BLD-MCNC: 0.93 MG/DL

## 2021-07-01 PROCEDURE — 73501 X-RAY EXAM HIP UNI 1 VIEW: CPT | Performed by: PHYSICIAN ASSISTANT

## 2021-07-01 PROCEDURE — 97161 PT EVAL LOW COMPLEX 20 MIN: CPT

## 2021-07-01 PROCEDURE — 88304 TISSUE EXAM BY PATHOLOGIST: CPT | Performed by: ORTHOPAEDIC SURGERY

## 2021-07-01 PROCEDURE — 97116 GAIT TRAINING THERAPY: CPT

## 2021-07-01 PROCEDURE — 76942 ECHO GUIDE FOR BIOPSY: CPT | Performed by: ANESTHESIOLOGY

## 2021-07-01 PROCEDURE — 88311 DECALCIFY TISSUE: CPT | Performed by: ORTHOPAEDIC SURGERY

## 2021-07-01 PROCEDURE — 0SRB04Z REPLACEMENT OF LEFT HIP JOINT WITH CERAMIC ON POLYETHYLENE SYNTHETIC SUBSTITUTE, OPEN APPROACH: ICD-10-PCS | Performed by: ORTHOPAEDIC SURGERY

## 2021-07-01 PROCEDURE — 82565 ASSAY OF CREATININE: CPT | Performed by: PHYSICIAN ASSISTANT

## 2021-07-01 DEVICE — CERAMIC HEAD UPCHARGE: Type: IMPLANTABLE DEVICE | Status: FUNCTIONAL

## 2021-07-01 DEVICE — PINNACLE HIP SOLUTIONS ALTRX POLYETHYLENE ACETABULAR LINER +4 NEUTRAL 36MM ID 56MM OD
Type: IMPLANTABLE DEVICE | Site: HIP | Status: FUNCTIONAL
Brand: PINNACLE ALTRX

## 2021-07-01 DEVICE — TOTAL HIP W/POR CUP & COCR HD: Type: IMPLANTABLE DEVICE | Status: FUNCTIONAL

## 2021-07-01 DEVICE — PINNACLE 100 ACETABULAR SHELL GRIPTION 100 56MM OD
Type: IMPLANTABLE DEVICE | Site: HIP | Status: FUNCTIONAL
Brand: PINNACLE GRIPTION

## 2021-07-01 DEVICE — BIOLOX DELTA CERAMIC FEMORAL HEAD +5.0 36MM DIA 12/14 TAPER
Type: IMPLANTABLE DEVICE | Site: HIP | Status: FUNCTIONAL
Brand: BIOLOX DELTA

## 2021-07-01 DEVICE — APEX HOLE ELIMINATOR - PS
Type: IMPLANTABLE DEVICE | Site: HIP | Status: FUNCTIONAL
Brand: APEX

## 2021-07-01 DEVICE — CORAIL HIP SYSTEM CEMENTLESS FEMORAL STEM HA COATED 12/14 AMT 135 DEGREES HIGH OFFSET COLLAR SIZE 12
Type: IMPLANTABLE DEVICE | Site: HIP | Status: FUNCTIONAL
Brand: CORAIL

## 2021-07-01 RX ORDER — BUPIVACAINE HYDROCHLORIDE 7.5 MG/ML
INJECTION, SOLUTION INTRASPINAL AS NEEDED
Status: DISCONTINUED | OUTPATIENT
Start: 2021-07-01 | End: 2021-07-01 | Stop reason: SURG

## 2021-07-01 RX ORDER — TRANEXAMIC ACID 10 MG/ML
1000 INJECTION, SOLUTION INTRAVENOUS ONCE
Status: COMPLETED | OUTPATIENT
Start: 2021-07-01 | End: 2021-07-01

## 2021-07-01 RX ORDER — ZOLPIDEM TARTRATE 5 MG/1
5 TABLET ORAL NIGHTLY PRN
Status: DISCONTINUED | OUTPATIENT
Start: 2021-07-01 | End: 2021-07-02

## 2021-07-01 RX ORDER — MORPHINE SULFATE 2 MG/ML
1 INJECTION, SOLUTION INTRAMUSCULAR; INTRAVENOUS EVERY 2 HOUR PRN
Status: DISCONTINUED | OUTPATIENT
Start: 2021-07-01 | End: 2021-07-02

## 2021-07-01 RX ORDER — TOPIRAMATE 25 MG/1
100 TABLET ORAL 2 TIMES DAILY
Status: DISCONTINUED | OUTPATIENT
Start: 2021-07-01 | End: 2021-07-02

## 2021-07-01 RX ORDER — NALOXONE HYDROCHLORIDE 0.4 MG/ML
80 INJECTION, SOLUTION INTRAMUSCULAR; INTRAVENOUS; SUBCUTANEOUS AS NEEDED
Status: DISCONTINUED | OUTPATIENT
Start: 2021-07-01 | End: 2021-07-01 | Stop reason: HOSPADM

## 2021-07-01 RX ORDER — MIDAZOLAM HYDROCHLORIDE 1 MG/ML
1 INJECTION INTRAMUSCULAR; INTRAVENOUS EVERY 5 MIN PRN
Status: DISCONTINUED | OUTPATIENT
Start: 2021-07-01 | End: 2021-07-01 | Stop reason: HOSPADM

## 2021-07-01 RX ORDER — SODIUM CHLORIDE 9 MG/ML
INJECTION, SOLUTION INTRAVENOUS CONTINUOUS
Status: DISCONTINUED | OUTPATIENT
Start: 2021-07-01 | End: 2021-07-02

## 2021-07-01 RX ORDER — DIPHENHYDRAMINE HYDROCHLORIDE 50 MG/ML
25 INJECTION INTRAMUSCULAR; INTRAVENOUS ONCE AS NEEDED
Status: ACTIVE | OUTPATIENT
Start: 2021-07-01 | End: 2021-07-01

## 2021-07-01 RX ORDER — VENLAFAXINE HYDROCHLORIDE 75 MG/1
75 CAPSULE, EXTENDED RELEASE ORAL DAILY
COMMUNITY

## 2021-07-01 RX ORDER — KETOROLAC TROMETHAMINE 30 MG/ML
15 INJECTION, SOLUTION INTRAMUSCULAR; INTRAVENOUS EVERY 6 HOURS
Status: COMPLETED | OUTPATIENT
Start: 2021-07-01 | End: 2021-07-02

## 2021-07-01 RX ORDER — BUTALBITAL, ACETAMINOPHEN AND CAFFEINE 50; 325; 40 MG/1; MG/1; MG/1
1 TABLET ORAL EVERY 6 HOURS PRN
Status: DISCONTINUED | OUTPATIENT
Start: 2021-07-01 | End: 2021-07-02

## 2021-07-01 RX ORDER — ATORVASTATIN CALCIUM 80 MG/1
80 TABLET, FILM COATED ORAL NIGHTLY
COMMUNITY

## 2021-07-01 RX ORDER — PROCHLORPERAZINE EDISYLATE 5 MG/ML
10 INJECTION INTRAMUSCULAR; INTRAVENOUS EVERY 6 HOURS PRN
Status: DISCONTINUED | OUTPATIENT
Start: 2021-07-01 | End: 2021-07-02

## 2021-07-01 RX ORDER — MEPERIDINE HYDROCHLORIDE 25 MG/ML
12.5 INJECTION INTRAMUSCULAR; INTRAVENOUS; SUBCUTANEOUS AS NEEDED
Status: DISCONTINUED | OUTPATIENT
Start: 2021-07-01 | End: 2021-07-01 | Stop reason: HOSPADM

## 2021-07-01 RX ORDER — POLYETHYLENE GLYCOL 3350 17 G/17G
17 POWDER, FOR SOLUTION ORAL DAILY PRN
Status: DISCONTINUED | OUTPATIENT
Start: 2021-07-01 | End: 2021-07-02

## 2021-07-01 RX ORDER — ATORVASTATIN CALCIUM 40 MG/1
80 TABLET, FILM COATED ORAL NIGHTLY
Status: DISCONTINUED | OUTPATIENT
Start: 2021-07-01 | End: 2021-07-02

## 2021-07-01 RX ORDER — SODIUM CHLORIDE, SODIUM LACTATE, POTASSIUM CHLORIDE, CALCIUM CHLORIDE 600; 310; 30; 20 MG/100ML; MG/100ML; MG/100ML; MG/100ML
INJECTION, SOLUTION INTRAVENOUS CONTINUOUS
Status: DISCONTINUED | OUTPATIENT
Start: 2021-07-01 | End: 2021-07-01 | Stop reason: HOSPADM

## 2021-07-01 RX ORDER — ONDANSETRON 2 MG/ML
INJECTION INTRAMUSCULAR; INTRAVENOUS AS NEEDED
Status: DISCONTINUED | OUTPATIENT
Start: 2021-07-01 | End: 2021-07-01 | Stop reason: SURG

## 2021-07-01 RX ORDER — MELATONIN
325
Status: DISCONTINUED | OUTPATIENT
Start: 2021-07-02 | End: 2021-07-02

## 2021-07-01 RX ORDER — MORPHINE SULFATE 2 MG/ML
2 INJECTION, SOLUTION INTRAMUSCULAR; INTRAVENOUS EVERY 2 HOUR PRN
Status: DISCONTINUED | OUTPATIENT
Start: 2021-07-01 | End: 2021-07-02

## 2021-07-01 RX ORDER — ACETAMINOPHEN 325 MG/1
650 TABLET ORAL ONCE
Status: COMPLETED | OUTPATIENT
Start: 2021-07-01 | End: 2021-07-01

## 2021-07-01 RX ORDER — OXYCODONE HYDROCHLORIDE 5 MG/1
5 TABLET ORAL EVERY 4 HOURS PRN
Status: DISCONTINUED | OUTPATIENT
Start: 2021-07-01 | End: 2021-07-02

## 2021-07-01 RX ORDER — MORPHINE SULFATE 4 MG/ML
4 INJECTION, SOLUTION INTRAMUSCULAR; INTRAVENOUS EVERY 2 HOUR PRN
Status: DISCONTINUED | OUTPATIENT
Start: 2021-07-01 | End: 2021-07-02

## 2021-07-01 RX ORDER — MIDAZOLAM HYDROCHLORIDE 1 MG/ML
INJECTION INTRAMUSCULAR; INTRAVENOUS AS NEEDED
Status: DISCONTINUED | OUTPATIENT
Start: 2021-07-01 | End: 2021-07-01 | Stop reason: SURG

## 2021-07-01 RX ORDER — ONDANSETRON 2 MG/ML
INJECTION INTRAMUSCULAR; INTRAVENOUS
Status: COMPLETED
Start: 2021-07-01 | End: 2021-07-01

## 2021-07-01 RX ORDER — ASPIRIN 325 MG
325 TABLET ORAL 2 TIMES DAILY
Status: DISCONTINUED | OUTPATIENT
Start: 2021-07-01 | End: 2021-07-02

## 2021-07-01 RX ORDER — DOCUSATE SODIUM 100 MG/1
100 CAPSULE, LIQUID FILLED ORAL 2 TIMES DAILY
Status: DISCONTINUED | OUTPATIENT
Start: 2021-07-01 | End: 2021-07-02

## 2021-07-01 RX ORDER — SODIUM PHOSPHATE, DIBASIC AND SODIUM PHOSPHATE, MONOBASIC 7; 19 G/133ML; G/133ML
1 ENEMA RECTAL ONCE AS NEEDED
Status: DISCONTINUED | OUTPATIENT
Start: 2021-07-01 | End: 2021-07-02

## 2021-07-01 RX ORDER — METOCLOPRAMIDE HYDROCHLORIDE 5 MG/ML
10 INJECTION INTRAMUSCULAR; INTRAVENOUS AS NEEDED
Status: DISCONTINUED | OUTPATIENT
Start: 2021-07-01 | End: 2021-07-01 | Stop reason: HOSPADM

## 2021-07-01 RX ORDER — ONDANSETRON 2 MG/ML
4 INJECTION INTRAMUSCULAR; INTRAVENOUS EVERY 4 HOURS PRN
Status: DISCONTINUED | OUTPATIENT
Start: 2021-07-01 | End: 2021-07-02

## 2021-07-01 RX ORDER — SENNOSIDES 8.6 MG
17.2 TABLET ORAL NIGHTLY
Status: DISCONTINUED | OUTPATIENT
Start: 2021-07-01 | End: 2021-07-02

## 2021-07-01 RX ORDER — BISACODYL 10 MG
10 SUPPOSITORY, RECTAL RECTAL
Status: DISCONTINUED | OUTPATIENT
Start: 2021-07-01 | End: 2021-07-02

## 2021-07-01 RX ORDER — DIPHENHYDRAMINE HYDROCHLORIDE 50 MG/ML
12.5 INJECTION INTRAMUSCULAR; INTRAVENOUS EVERY 4 HOURS PRN
Status: DISCONTINUED | OUTPATIENT
Start: 2021-07-01 | End: 2021-07-02

## 2021-07-01 RX ORDER — DIPHENHYDRAMINE HCL 25 MG
25 CAPSULE ORAL EVERY 4 HOURS PRN
Status: DISCONTINUED | OUTPATIENT
Start: 2021-07-01 | End: 2021-07-02

## 2021-07-01 RX ORDER — SODIUM CHLORIDE, SODIUM LACTATE, POTASSIUM CHLORIDE, CALCIUM CHLORIDE 600; 310; 30; 20 MG/100ML; MG/100ML; MG/100ML; MG/100ML
INJECTION, SOLUTION INTRAVENOUS CONTINUOUS
Status: DISCONTINUED | OUTPATIENT
Start: 2021-07-01 | End: 2021-07-02

## 2021-07-01 RX ORDER — OXYCODONE HYDROCHLORIDE 5 MG/1
2.5 TABLET ORAL EVERY 4 HOURS PRN
Status: DISCONTINUED | OUTPATIENT
Start: 2021-07-01 | End: 2021-07-02

## 2021-07-01 RX ORDER — ONDANSETRON 2 MG/ML
4 INJECTION INTRAMUSCULAR; INTRAVENOUS AS NEEDED
Status: DISCONTINUED | OUTPATIENT
Start: 2021-07-01 | End: 2021-07-01 | Stop reason: HOSPADM

## 2021-07-01 RX ORDER — ACETAMINOPHEN 325 MG/1
TABLET ORAL
Status: COMPLETED
Start: 2021-07-01 | End: 2021-07-01

## 2021-07-01 RX ORDER — CETIRIZINE HYDROCHLORIDE 10 MG/1
10 TABLET ORAL EVERY EVENING
Status: DISCONTINUED | OUTPATIENT
Start: 2021-07-01 | End: 2021-07-02

## 2021-07-01 RX ORDER — DEXAMETHASONE SODIUM PHOSPHATE 10 MG/ML
8 INJECTION, SOLUTION INTRAMUSCULAR; INTRAVENOUS ONCE
Status: COMPLETED | OUTPATIENT
Start: 2021-07-02 | End: 2021-07-02

## 2021-07-01 RX ORDER — MORPHINE SULFATE 4 MG/ML
2 INJECTION, SOLUTION INTRAMUSCULAR; INTRAVENOUS EVERY 5 MIN PRN
Status: DISCONTINUED | OUTPATIENT
Start: 2021-07-01 | End: 2021-07-01 | Stop reason: HOSPADM

## 2021-07-01 RX ORDER — DEXAMETHASONE SODIUM PHOSPHATE 4 MG/ML
VIAL (ML) INJECTION AS NEEDED
Status: DISCONTINUED | OUTPATIENT
Start: 2021-07-01 | End: 2021-07-01 | Stop reason: SURG

## 2021-07-01 RX ORDER — VENLAFAXINE HYDROCHLORIDE 75 MG/1
75 CAPSULE, EXTENDED RELEASE ORAL DAILY
Status: DISCONTINUED | OUTPATIENT
Start: 2021-07-02 | End: 2021-07-02

## 2021-07-01 RX ORDER — TOPIRAMATE 100 MG/1
100 TABLET, FILM COATED ORAL 2 TIMES DAILY
COMMUNITY

## 2021-07-01 RX ORDER — ACETAMINOPHEN 325 MG/1
650 TABLET ORAL 4 TIMES DAILY
Status: DISCONTINUED | OUTPATIENT
Start: 2021-07-01 | End: 2021-07-02

## 2021-07-01 RX ADMIN — TRANEXAMIC ACID 1000 MG: 10 INJECTION, SOLUTION INTRAVENOUS at 09:58:00

## 2021-07-01 RX ADMIN — DEXAMETHASONE SODIUM PHOSPHATE 8 MG: 4 MG/ML VIAL (ML) INJECTION at 10:19:00

## 2021-07-01 RX ADMIN — MIDAZOLAM HYDROCHLORIDE 2 MG: 1 INJECTION INTRAMUSCULAR; INTRAVENOUS at 09:46:00

## 2021-07-01 RX ADMIN — SODIUM CHLORIDE, SODIUM LACTATE, POTASSIUM CHLORIDE, CALCIUM CHLORIDE: 600; 310; 30; 20 INJECTION, SOLUTION INTRAVENOUS at 09:44:00

## 2021-07-01 RX ADMIN — BUPIVACAINE HYDROCHLORIDE 1.6 ML: 7.5 INJECTION, SOLUTION INTRASPINAL at 09:52:00

## 2021-07-01 RX ADMIN — ONDANSETRON 4 MG: 2 INJECTION INTRAMUSCULAR; INTRAVENOUS at 10:19:00

## 2021-07-01 NOTE — PHYSICAL THERAPY NOTE
PHYSICAL THERAPY HIP EVALUATION - INPATIENT     Room Number: 366/366-A  Evaluation Date: 7/1/2021  Type of Evaluation: Initial  Physician Order: PT Eval and Treat    Presenting Problem: s/p left ORIN 7/1/21  Reason for Therapy: Mobility Dysfunction and Disc extremity           L Lower Extremity: Weight Bearing as Tolerated    PAIN ASSESSMENT  Ratin  Location: left hip  Management Techniques:  Activity promotion    COGNITION  · Overall Cognitive Status:  WFL - within functional limits  · Safety Judgement: to perform ankle pumps, reports return of sensation left LE, able to perform left quad set and SAQ with good quality. Pt transferred supine to sit with min a for left LE only, good sitting balance, no dizziness, sitting bp 141/57.    Pt provided verbal ins evaluation, patient's clinical presentation is stable and overall the evaluation complexity is considered low. DISCHARGE RECOMMENDATIONS  PT Discharge Recommendations: Home with home health PT    PLAN  PT Treatment Plan: Endurance; Energy conservation; Jojo

## 2021-07-01 NOTE — OPERATIVE REPORT
PATIENT'S NAME: Riaz Brooks   ATTENDING PHYSICIAN: Shakir Del Cid MD   OPERATING PHYSICIAN: Shakir Del Cid MD   PATIENT ACCOUNT#:   [de-identified]    LOCATION:  [address] [place] [address],[address] RECORD #:   [id number]    YOB: 1955  A trochanter and down in line with the vastus lateralis.   The tendon was subperiosteally elevated off the anterior aspect of the greater trochanter down to  capsule, which was incised in a T-type fashion, partially excising the capsule to gain access to the place with good fixation. The final reduction was performed and the hip was put through good range of motion with good leg length and offset and no instability. The pin was removed from the pelvis.   Irrisept irrigation was placed in the wound and allowed

## 2021-07-01 NOTE — HOME CARE LIAISON
Patient setup with [address] pre-operatively. [facility] to follow upon discharge. Met with patient at the bedside to discuss [facility] home health and confirm choice. Patient remains agreeable to [facility] upon discharge.     Resid

## 2021-07-01 NOTE — ANESTHESIA POSTPROCEDURE EVALUATION
[facility] [place] Patient Status:  Outpatient in a Bed   Age/Gender 77year old female MRN [id number]   Location [facility] Attending Queta Lundberg MD   Hosp Day # 0 PCP Kira Wilson MD

## 2021-07-01 NOTE — PLAN OF CARE
Received pt from pacu at 1300. Decreased sensation and movement to LE's due to block and spinal.  Denies pain. Dressing dry and intact to left hip, ice in place. Pt verbalized understanding of POC and fall precautions. Will continue to monitor.

## 2021-07-01 NOTE — INTERVAL H&P NOTE
Pre-op Diagnosis: Osteoarthritis resulting from left hip dysplasia [M16.32]    The above referenced H&P was reviewed by Rosalba Sosa MD on 7/1/2021, the patient was examined and no significant changes have occurred in the patient's condition since the

## 2021-07-01 NOTE — ANESTHESIA PREPROCEDURE EVALUATION
PRE-OP EVALUATION    Patient Name: Radha Sampson    Admit Diagnosis: Osteoarthritis resulting from left hip dysplasia [M16.32]    Pre-op Diagnosis: Osteoarthritis resulting from left hip dysplasia [M16.32]    LEFT TOTAL HIP ARTHROPLASTY    Anesthes hours as needed. , Disp: , Rfl:         Allergies: Dilaudid [Hydromorphone], Pcn [Penicillins], Sulfa Antibiotics, and Tramadol      Anesthesia Evaluation    Patient summary reviewed.     Anesthetic Complications  (+) history of anesthetic complications  H Other findings            ASA: 3   Plan: spinal  NPO status verified and patient meets guidelines. Comment: Discussed option of spinal vs GETA. Pt. Elects spinal and understands risk of HA, infection, bleeding, back pain, incomplete/failed block.

## 2021-07-01 NOTE — CONSULTS
[facility] Hospitalist Team  Initial Consult          Assessment/Plan:       S/P L ORIN  - Plan per surgery. Continue PT/OT. Pain is currently controlled.    Hg in AM    #Hx of migraines  -cont topamax, ssri and prn fioricet  -hold BB for low BP currently    #HL- x3   • TOTAL HIP REPLACEMENT      right hip      Social History    Tobacco Use      Smoking status: Current Every Day Smoker        Packs/day: 1.00        Years: 25.00        Pack years: 25        Types: Cigarettes      Smokeless tobacco: Never Used Non distended   Extremities: No le edema    Skin: Skin color, texture, turgor normal. No rashes or lesions.     Neurologic: no focal deficit appreciated         Data:     Laboratory:  No results for input(s): WBC, HGB, MCV, PLT, BAND, INR in the last 168 ho

## 2021-07-01 NOTE — ANESTHESIA PROCEDURE NOTES
Spinal Block  Performed by: Leopoldo Barajas MD  Authorized by: Leopoldo Barajas MD       General Information and Staff    Start Time:  7/1/2021 9:44 AM  End Time:  7/1/2021 9:52 AM  Anesthesiologist:  Leopoldo Barajas MD  Performed by:   Anesthesiolo

## 2021-07-01 NOTE — BRIEF OP NOTE
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

## 2021-07-01 NOTE — H&P
Office Visit  6/17/2021  [address] [place] [address],[address] Organization   Encounter Summary      Nadine Wilson \"Hali Ribeiro\" - 77 y.o.  Female; born [birth year]Encounter Summary, generated on Saturation 97% 06/17/2021 11:06 AM CDT     Inhaled Oxygen Concentration - -     Weight 65.3 kg (144 lb) 06/17/2021 11:06 AM CDT     Height 165.1 cm (5' 5\") 06/17/2021 11:06 AM CDT     Body Mass Index 23.96 06/17/2021 11:06 AM CDT     Functional Status  - copd,asthma. H/o of tia with placement of loop recorder.     CARDIAC RISK FACTORS  Tobacco: yes  Hypertension: yes  Diabetes: no  Lipids: yes  Family History:   Family History   Problem Relation Age of Onset   • Cancer Mother   colon CA   • Neurologic Janes Carlos recorder ([employer]) 1/8/18   • Tobacco abuse     Past Surgical History:   Procedure Laterality Date   • Abdominal aortic aneurysm repair, endovascular 06/12/2019 6/12/2019 s/p AAA EVAR [place] excluder device: Right trunk ipsilateral main body 31 mm x tablet Take 1 tablet by mouth 2 times daily. • venlafaxine XR (EFFEXOR XR) 75 MG 24 hr capsule Take 1 capsule by mouth every morning. No current facility-administered medications for this visit.      Review of Systems:   Constitutional: Negative for c and intact distal pulses. Exam reveals no gallop. No murmur heard. Pulmonary/Chest: Effort normal and breath sounds normal. no wheezes. no rales. no tenderness. Neurological: alert and oriented to person, place, and time. Skin: Skin is warm and dry. GLUCOSE, FASTING (mg/dL)   Date Value   06/16/2021 87     EGFR*  (mL/min/[1.73m2])   Date Value   06/16/2021 49 (L)     EGFR* NON-AFRICAN AMERICAN (mL/min/[1.73m2])   Date Value   06/16/2021 41 (L)     cxr 3/21 clear lung fields.     ASS due on 3/2/2022   Medicare Wellness Visit (Yearly) Next due on 3/18/2022   DM/CKD GFR (Yearly) Next due on 6/16/2022   Hepatitis C Screening Completed   COVID-19 Vaccine Completed   Hepatitis B Vaccine Aged Out   Meningococcal Vaccine Aged Out   HPV Vaccin NEGATIVE NEGATIVE [address] [facility] [address] ESTERASE URINE NEGATIVE NEGATIVE [address]     NITRITE URINE NEGATIVE NEGATIVE [facility]       URINALYSIS & REFLEX MICROSCOPY WITH CULTURE IF INDICATED (06/17/2021 12:05 PM CDT)   Specime Document Information    Primary Care Provider Other Service Providers Document Coverage Dates   Goyo Joel MD (Dec. 28, 2018December 28, 2018 - Present)  [phone] (Work) [phone] (Fal39 [phone], [address] [facility]

## 2021-07-02 VITALS
HEIGHT: 66 IN | BODY MASS INDEX: 23.38 KG/M2 | OXYGEN SATURATION: 96 % | WEIGHT: 145.5 LBS | RESPIRATION RATE: 18 BRPM | HEART RATE: 85 BPM | SYSTOLIC BLOOD PRESSURE: 129 MMHG | DIASTOLIC BLOOD PRESSURE: 81 MMHG | TEMPERATURE: 98 F

## 2021-07-02 LAB
HCT VFR BLD AUTO: 32.4 %
HGB BLD-MCNC: 9.9 G/DL

## 2021-07-02 PROCEDURE — 97535 SELF CARE MNGMENT TRAINING: CPT

## 2021-07-02 PROCEDURE — 85014 HEMATOCRIT: CPT | Performed by: PHYSICIAN ASSISTANT

## 2021-07-02 PROCEDURE — 97165 OT EVAL LOW COMPLEX 30 MIN: CPT

## 2021-07-02 PROCEDURE — 97110 THERAPEUTIC EXERCISES: CPT

## 2021-07-02 PROCEDURE — 85018 HEMOGLOBIN: CPT | Performed by: PHYSICIAN ASSISTANT

## 2021-07-02 PROCEDURE — 97116 GAIT TRAINING THERAPY: CPT

## 2021-07-02 RX ORDER — METOPROLOL TARTRATE 50 MG/1
50 TABLET, FILM COATED ORAL NIGHTLY
Status: DISCONTINUED | OUTPATIENT
Start: 2021-07-02 | End: 2021-07-02

## 2021-07-02 NOTE — CM/SW NOTE
07/02/21 0900   CM/SW Referral Data   Referral Source Social Work (self-referral)   Reason for Referral Discharge planning   Discharge Needs   Anticipated D/C needs Home health care          HOME SITUATION  Type of Home: House   Home Layout: Two level

## 2021-07-02 NOTE — PROGRESS NOTES
Patient discharged to home this afternoon. Spouse watched discharge video prior depart. Reviewed all discharge paperwork at bedside with patient and spouse. All questions answered at this time. Spent a total of 30 minutes on discharge paperwork.  Patient an

## 2021-07-02 NOTE — PHYSICAL THERAPY NOTE
Operative Note    Patient: Vik Sandy 34 year old male    MRN: [id number]    Surgeon(s): Jarrett Valdez MD  Phone Number: [phone]                       Surgeon(s) and Role:     * Jarrett Valdez MD - Primary    Assistant(s):     Pre-Op Diagnosis:   1. 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 Chronic left visually significant epiphora  3. History of resolved left acute dacryocystitis      Post-Op Diagnosis:  Same     Procedure: Procedure(s):  1. Left dacryocystorhinostomy  2. Probing irrigation and intubation of the left lacrimal system  3. Left nasal endoscopy with lacrimal surgery    Anesthesia Type: General                                   Complications: None    Description:  Removal of the lacrimal sac fossa bone, fistulization of lacrimal sac to nasal cavity, stenting and packing of the nose    Findings:  Informed consent was obtained in the holding area.  The patient was taken to the operating room.  He was laid supine on the operating cart.  He was induced into general anesthesia by the anesthesia service.  He was prepped and draped for this procedure.  Protective corneal Shields with Goniosol on the underside were placed between the eyelids bilateral.  The left puncta were dilated and irrigation was performed with significant resistance to irrigation and complete reflux through the same and opposite puncta.  There was some mucoid discharge admixed with the reflux.  The 0 degree endonasal endoscope was placed into the nose.  There was no fluorescein noted in the inferior meatus.  The left middle turbinate and lateral wall mucosa overlying the lacrimal sac fossa was then injected with 1% lidocaine with 1 100,000 epinephrine.  The area between the turbinate and the lateral wall was then packed with several cottonoid soaked in oxymetazoline solution.  Sufficient time was allowed for decongestion.  A Traore probe was then passed through the upper system on the left and into the  PHYSICAL THERAPY TREATMENT NOTE - INPATIENT    Room Number: 366/366-A     Session: 1     Number of Visits to Meet Established Goals: 2    Presenting Problem: s/p left ORIN 7/1/21    ASSESSMENT     Pt able to complete all mobility at Mod I level with use Static Sitting: Good  Dynamic Sitting: Good           Static Standing: Poor +  Dynamic Standing: Poor +    ACTIVITY TOLERANCE nasolacrimal duct through the lacrimal sac.  The automated debrider was then used to remove the mucosa overlying the lacrimal sac fossa as well as overlying the uncinate process to prevent any shards of mucosa from obstructing the fistula postoperatively.  The fani bur was then used to actually remove the lacrimal sac fossa wall.  A Super Sharp ophthalmic blade was then used to vertically open the lacrimal sac.  The probe could be done rotated into the nasopharynx.  The automated debrider was then used to remove the medial half of the lacrimal sac mucosa to create a very large opening from above the common internal punctum to the inferior infundibulum.  Irrigation was then performed with minimal resistance but significant efflux into the nasopharynx through both the upper and lower system.  The Ritleng probe was passed through the upper punctum and into the nose.  The Ritleng [place] mono canalicular stent was then passed through the upper system and seated in the upper punctum.  Irrigation was performed once again through the lower lid with good efflux into the nose.  The rhinostomy and the middle meatus was filled with Stammberger Sinufoam to prevent adhesions and foster hemostasis postoperatively.  Irrigation was then performed again and the a small pathway for drainage was created through the Stammberger into the nasopharynx.  He was undraped, cleaned up and the corneal shield was removed.  The tube was cut inside the nares.  TobraDex drops were placed in the eye and the patient was awakened from general anesthesia.  Specimens Removed: No specimens collected     Estimated Blood Loss: No Value 10 cc     Assistant Tasks: None     Implants:   Implant Name Type Inv. Item Serial No.  Lot No. LRB No. Used Action   STENT MONOKA 3MM SELF THRD GUIDE [facility] [employer] - [id number] Stent STENT MONOKA 3MM SELF THRD GUIDE [facility] [id number] Left 1 Implanted       I was present for the key portions  of the procedure and was immediately available for the non-key portions

## 2021-07-02 NOTE — PROGRESS NOTES
Anesthesia Pain Service    POD# 1 s/p ORIN    Block type: Lower extremity: Fascia Iliaca    Laterality: left    Injection technique: Single shot    Post op review: No evidence of immediate block related complications, No paresthesia noted, Able to plantar f

## 2021-07-02 NOTE — PROGRESS NOTES
Significant other at bedside. Reviewed indications, side effects of pain medication/narcotics and constipation prevention.  Stressed importance of increased fluids/roughage in diet, continued use of stool softeners along with laxatives and suppositories as

## 2021-07-02 NOTE — PLAN OF CARE
A&O x 4. VSS. On RA. Left hip pain well controlled with scheduled and PRN pain meds. Aquacel to left hip C/D/I. Gel ice in place. Up with min assist and walker to bathroom, voiding without issue. SCD's/Spandigrip/ASA.  Reviewed POC, pain management, and fal

## 2021-07-02 NOTE — PROGRESS NOTES
[facility] Hospitalist Team  Progress Note      Shanti Mckay  9/29/1955    Assessment/Plan:          S/P L ORIN  - Plan per surgery. Continue PT/OT. Pain is currently controlled.    Hg in AM- 9.9     #Hx of migraines  -cont topamax, ssri and prn fioricet sodium chloride 0.9%, zolpidem, PEG 3350, magnesium hydroxide, bisacodyl, Fleet Enema, ondansetron HCl, Prochlorperazine Edisylate, diphenhydrAMINE **OR** diphenhydrAMINE HCl, oxyCODONE HCl **OR** oxyCODONE HCl, tiZANidine HCl, morphINE sulfate **OR** morp

## 2021-07-02 NOTE — PLAN OF CARE
Pt reporting significant pain this am, resolved with morphine and PO pain medication. Pain has been well controlled since then on oral medications. A/Ox4, a bit anxious at times. Up with standby assist and walker, gait steady. +flatus.  Cleared by PT/OT for

## 2021-07-02 NOTE — OCCUPATIONAL THERAPY NOTE
OCCUPATIONAL THERAPY QUICK EVALUATION - INPATIENT    Room Number: 366/366-A  Evaluation Date: 7/2/2021     Type of Evaluation: Quick Eval  Presenting Problem: s/p L ORIN on 7/1     Physician Order: IP Consult to Occupational Therapy  Reason for Therapy:  AD other    Toilet and Equipment: Standard height toilet; Toilet riser  Shower/Tub and Equipment: Walk-in shower; Shower chair  Other Equipment: Toileting aid (RW, cane )    Occupation/Status: Retired   Hand Dominance: Right  Drives: Yes  Patient Regularly Uses Supervision    Skilled Therapy Provided: Pt was found sitting up in a chair. Pt was re-educated on anterior hip precautions.  Pt performed lower body dressing with supervision assist and upper body dressing with supervision assist with full set-up from staf limited treatment options    Overall Complexity  LOW     OT Discharge Recommendations: Home; Intermittent Supervision  OT Device Recommendations: None    PLAN   Patient has been evaluated and presents with no skilled Occupational Therapy needs at this time.

## 2021-07-05 NOTE — PAYOR COMM NOTE
Discharge Notification    Patient Name: Maris Baez #: [id number]  Authorization Number: [id number]  Admit Date/Time: 7/1/2021 7:37 AM  Discharge Date/Time: 7/2/2021 2:30 PM

## 2021-07-06 PROBLEM — Z47.89 ORTHOPEDIC AFTERCARE: Status: ACTIVE | Noted: 2021-07-06

## 2021-07-13 PROBLEM — Z96.642 STATUS POST TOTAL REPLACEMENT OF LEFT HIP: Status: ACTIVE | Noted: 2021-07-13

## 2021-08-20 ENCOUNTER — TELEPHONE (OUTPATIENT)
Dept: INTERNAL MEDICINE | Age: 66
End: 2021-08-20

## 2021-08-24 ENCOUNTER — OFFICE VISIT (OUTPATIENT)
Dept: NEPHROLOGY | Age: 66
End: 2021-08-24

## 2021-08-24 VITALS
WEIGHT: 147 LBS | BODY MASS INDEX: 24.46 KG/M2 | SYSTOLIC BLOOD PRESSURE: 126 MMHG | HEART RATE: 102 BPM | DIASTOLIC BLOOD PRESSURE: 78 MMHG

## 2021-08-24 DIAGNOSIS — I10 ESSENTIAL HYPERTENSION: ICD-10-CM

## 2021-08-24 DIAGNOSIS — N20.0 KIDNEY STONE: ICD-10-CM

## 2021-08-24 DIAGNOSIS — I12.9 NEPHROSCLEROSIS ARTERIOLAR, STAGE 1-4 OR UNSPECIFIED CHRONIC KIDNEY DISEASE: ICD-10-CM

## 2021-08-24 DIAGNOSIS — N18.32 STAGE 3B CHRONIC KIDNEY DISEASE (CMD): Primary | ICD-10-CM

## 2021-08-24 DIAGNOSIS — I70.1 RENAL ARTERY STENOSIS (CMD): ICD-10-CM

## 2021-08-24 DIAGNOSIS — E87.5 HYPERKALEMIA: ICD-10-CM

## 2021-08-24 PROCEDURE — 3078F DIAST BP <80 MM HG: CPT | Performed by: INTERNAL MEDICINE

## 2021-08-24 PROCEDURE — 99214 OFFICE O/P EST MOD 30 MIN: CPT | Performed by: INTERNAL MEDICINE

## 2021-08-24 PROCEDURE — 3074F SYST BP LT 130 MM HG: CPT | Performed by: INTERNAL MEDICINE

## 2021-09-01 ENCOUNTER — TELEPHONE (OUTPATIENT)
Dept: CARDIOLOGY | Age: 66
End: 2021-09-01

## 2021-09-20 ENCOUNTER — APPOINTMENT (OUTPATIENT)
Dept: INTERNAL MEDICINE | Age: 66
End: 2021-09-20

## 2021-09-26 PROBLEM — Z95.828 HISTORY OF ENDOVASCULAR STENT GRAFT FOR ABDOMINAL AORTIC ANEURYSM (AAA): Status: ACTIVE | Noted: 2021-09-26

## 2021-09-26 PROBLEM — Z86.711 HISTORY OF PULMONARY EMBOLISM: Status: ACTIVE | Noted: 2018-09-01

## 2021-09-26 PROBLEM — Z95.828 HISTORY OF ENDOVASCULAR STENT GRAFT FOR ABDOMINAL AORTIC ANEURYSM (AAA): Status: RESOLVED | Noted: 2021-09-26 | Resolved: 2021-09-26

## 2021-09-26 ASSESSMENT — ENCOUNTER SYMPTOMS
EYES NEGATIVE: 1
ENDOCRINE NEGATIVE: 1
CONSTITUTIONAL NEGATIVE: 1
RESPIRATORY NEGATIVE: 1
PSYCHIATRIC NEGATIVE: 1
NEUROLOGICAL NEGATIVE: 1
HEMATOLOGIC/LYMPHATIC NEGATIVE: 1
GASTROINTESTINAL NEGATIVE: 1

## 2021-09-27 ENCOUNTER — TELEPHONE (OUTPATIENT)
Dept: CARDIOLOGY | Age: 66
End: 2021-09-27

## 2021-09-27 ENCOUNTER — OFFICE VISIT (OUTPATIENT)
Dept: CARDIOLOGY | Age: 66
End: 2021-09-27

## 2021-09-27 VITALS
OXYGEN SATURATION: 96 % | HEART RATE: 71 BPM | WEIGHT: 145 LBS | SYSTOLIC BLOOD PRESSURE: 130 MMHG | BODY MASS INDEX: 24.16 KG/M2 | HEIGHT: 65 IN | DIASTOLIC BLOOD PRESSURE: 84 MMHG

## 2021-09-27 DIAGNOSIS — N18.32 STAGE 3B CHRONIC KIDNEY DISEASE (CMD): ICD-10-CM

## 2021-09-27 DIAGNOSIS — Z86.711 HISTORY OF PULMONARY EMBOLISM: ICD-10-CM

## 2021-09-27 DIAGNOSIS — G45.9 TIA (TRANSIENT ISCHEMIC ATTACK): ICD-10-CM

## 2021-09-27 DIAGNOSIS — I10 ESSENTIAL HYPERTENSION: ICD-10-CM

## 2021-09-27 DIAGNOSIS — Z95.828 HISTORY OF ENDOVASCULAR STENT GRAFT FOR ABDOMINAL AORTIC ANEURYSM (AAA): Primary | ICD-10-CM

## 2021-09-27 DIAGNOSIS — Z98.890 S/P CARDIAC CATH: ICD-10-CM

## 2021-09-27 DIAGNOSIS — Z72.0 TOBACCO USE: ICD-10-CM

## 2021-09-27 DIAGNOSIS — E78.2 MIXED HYPERLIPIDEMIA: ICD-10-CM

## 2021-09-27 PROBLEM — Z96.642 STATUS POST TOTAL REPLACEMENT OF LEFT HIP: Status: ACTIVE | Noted: 2021-07-13

## 2021-09-27 PROCEDURE — 3075F SYST BP GE 130 - 139MM HG: CPT | Performed by: INTERNAL MEDICINE

## 2021-09-27 PROCEDURE — 99214 OFFICE O/P EST MOD 30 MIN: CPT | Performed by: INTERNAL MEDICINE

## 2021-09-27 PROCEDURE — 3079F DIAST BP 80-89 MM HG: CPT | Performed by: INTERNAL MEDICINE

## 2021-09-27 PROCEDURE — 93000 ELECTROCARDIOGRAM COMPLETE: CPT | Performed by: INTERNAL MEDICINE

## 2021-09-28 ENCOUNTER — APPOINTMENT (OUTPATIENT)
Dept: INTERNAL MEDICINE | Age: 66
End: 2021-09-28

## 2021-10-01 ENCOUNTER — TELEPHONE (OUTPATIENT)
Dept: CARDIOLOGY | Age: 66
End: 2021-10-01

## 2021-10-18 ENCOUNTER — TELEPHONE (OUTPATIENT)
Dept: CARDIOLOGY | Age: 66
End: 2021-10-18

## 2021-11-11 ENCOUNTER — LAB SERVICES (OUTPATIENT)
Dept: LAB | Age: 66
End: 2021-11-11

## 2021-11-11 DIAGNOSIS — R39.15 URINARY URGENCY: ICD-10-CM

## 2021-11-11 DIAGNOSIS — R35.0 FREQUENCY OF URINATION: ICD-10-CM

## 2021-11-11 DIAGNOSIS — M54.50 LOW BACK PAIN, UNSPECIFIED BACK PAIN LATERALITY, UNSPECIFIED CHRONICITY, UNSPECIFIED WHETHER SCIATICA PRESENT: ICD-10-CM

## 2021-11-11 DIAGNOSIS — Z78.9 DISCOMFORT: ICD-10-CM

## 2021-11-11 LAB
BILIRUBIN URINE: NEGATIVE
BLOOD URINE: ABNORMAL
CLARITY: ABNORMAL
COLOR: YELLOW
GLUCOSE QUALITATIVE U: NEGATIVE
HYALINE CAST: ABNORMAL
KETONES, URINE: NEGATIVE
LEUKOCYTE ESTERASE URINE: NEGATIVE
MUCOUS: ABNORMAL
NITRITE URINE: NEGATIVE
PH URINE: 5 (ref 5–7)
RED BLOOD CELLS URINE: ABNORMAL (ref 0–3)
SPECIFIC GRAVITY URINE: 1.03 (ref 1–1.03)
SQUAMOUS EPITHELIAL CELLS: ABNORMAL
URINE PROTEIN, QUAL (DIPSTICK): NEGATIVE
UROBILINOGEN URINE: <2
WHITE BLOOD CELLS URINE: ABNORMAL (ref 0–5)

## 2021-11-11 PROCEDURE — 87088 URINE BACTERIA CULTURE: CPT | Performed by: FAMILY MEDICINE

## 2021-11-11 PROCEDURE — 87186 SC STD MICRODIL/AGAR DIL: CPT | Performed by: FAMILY MEDICINE

## 2021-11-11 PROCEDURE — 81003 URINALYSIS AUTO W/O SCOPE: CPT | Performed by: FAMILY MEDICINE

## 2021-11-11 PROCEDURE — 87086 URINE CULTURE/COLONY COUNT: CPT | Performed by: FAMILY MEDICINE

## 2021-11-12 ENCOUNTER — WALK IN (OUTPATIENT)
Dept: URGENT CARE | Age: 66
End: 2021-11-12

## 2021-11-12 VITALS
DIASTOLIC BLOOD PRESSURE: 78 MMHG | TEMPERATURE: 98 F | OXYGEN SATURATION: 98 % | SYSTOLIC BLOOD PRESSURE: 122 MMHG | HEART RATE: 75 BPM | RESPIRATION RATE: 18 BRPM

## 2021-11-12 DIAGNOSIS — R30.0 DYSURIA: Primary | ICD-10-CM

## 2021-11-12 PROCEDURE — 3078F DIAST BP <80 MM HG: CPT | Performed by: FAMILY MEDICINE

## 2021-11-12 PROCEDURE — 99213 OFFICE O/P EST LOW 20 MIN: CPT | Performed by: FAMILY MEDICINE

## 2021-11-12 PROCEDURE — 3074F SYST BP LT 130 MM HG: CPT | Performed by: FAMILY MEDICINE

## 2021-11-13 ENCOUNTER — TELEPHONE (OUTPATIENT)
Dept: URGENT CARE | Age: 66
End: 2021-11-13

## 2021-11-13 LAB — FINAL REPORT: ABNORMAL

## 2021-11-13 RX ORDER — CEPHALEXIN 500 MG/1
500 CAPSULE ORAL 3 TIMES DAILY
Qty: 21 CAPSULE | Refills: 0 | Status: SHIPPED | OUTPATIENT
Start: 2021-11-13 | End: 2021-11-20

## 2021-11-18 ENCOUNTER — PATIENT MESSAGE (OUTPATIENT)
Dept: NEPHROLOGY | Age: 66
End: 2021-11-18

## 2021-12-16 ENCOUNTER — TELEPHONE (OUTPATIENT)
Dept: FAMILY MEDICINE | Age: 66
End: 2021-12-16

## 2021-12-16 ENCOUNTER — LAB SERVICES (OUTPATIENT)
Dept: LAB | Age: 66
End: 2021-12-16

## 2021-12-16 ENCOUNTER — WALK IN (OUTPATIENT)
Dept: URGENT CARE | Age: 66
End: 2021-12-16

## 2021-12-16 VITALS
OXYGEN SATURATION: 99 % | RESPIRATION RATE: 16 BRPM | DIASTOLIC BLOOD PRESSURE: 100 MMHG | SYSTOLIC BLOOD PRESSURE: 140 MMHG | TEMPERATURE: 97.9 F | HEART RATE: 68 BPM

## 2021-12-16 DIAGNOSIS — R39.9 UNSPECIFIED SYMPTOMS AND SIGNS INVOLVING THE GENITOURINARY SYSTEM: ICD-10-CM

## 2021-12-16 DIAGNOSIS — Z20.822 SUSPECTED COVID-19 VIRUS INFECTION: ICD-10-CM

## 2021-12-16 DIAGNOSIS — R68.83 CHILLS: ICD-10-CM

## 2021-12-16 DIAGNOSIS — R19.7 DIARRHEA, UNSPECIFIED TYPE: ICD-10-CM

## 2021-12-16 DIAGNOSIS — R68.83 CHILLS (WITHOUT FEVER): ICD-10-CM

## 2021-12-16 DIAGNOSIS — R39.9 UTI SYMPTOMS: Primary | ICD-10-CM

## 2021-12-16 DIAGNOSIS — Z20.822 CONTACT WITH AND (SUSPECTED) EXPOSURE TO COVID-19: ICD-10-CM

## 2021-12-16 LAB
ALBUMIN SERPL-MCNC: 4.5 G/DL (ref 3.6–5.1)
ALP SERPL-CCNC: 83 U/L (ref 45–130)
ALT SERPL W/O P-5'-P-CCNC: 23 U/L (ref 4–38)
AMYLASE SERPL-CCNC: 70 U/L (ref 30–110)
AST SERPL-CCNC: 31 U/L (ref 14–43)
BASOPHIL %: 0.8 % (ref 0–1.2)
BASOPHIL ABSOLUTE #: 0.1 10*3/UL (ref 0–0.1)
BILIRUB SERPL-MCNC: 0.5 MG/DL (ref 0–1.3)
BILIRUBIN URINE: NEGATIVE
BLOOD URINE: ABNORMAL
BUN SERPL-MCNC: 14 MG/DL (ref 7–20)
CALCIUM SERPL-MCNC: 9.5 MG/DL (ref 8.6–10.6)
CHLORIDE SERPL-SCNC: 113 MMOL/L (ref 96–107)
CLARITY: ABNORMAL
CO2 SERPL-SCNC: 18 MMOL/L (ref 22–32)
COLOR: YELLOW
CREAT SERPL-MCNC: 1.2 MG/DL (ref 0.5–1.4)
DIFFERENTIAL TYPE: ABNORMAL
EOSINOPHIL %: 1.5 % (ref 0–10)
EOSINOPHIL ABSOLUTE #: 0.2 10*3/UL (ref 0–0.5)
FLUAV AG UPPER RESP QL IA.RAPID: NEGATIVE
FLUBV AG UPPER RESP QL IA.RAPID: NEGATIVE
GFR SERPL CREATININE-BSD FRML MDRD: 45 ML/MIN/{1.73M2}
GFR SERPL CREATININE-BSD FRML MDRD: 54 ML/MIN/{1.73M2}
GLUCOSE QUALITATIVE U: NEGATIVE
GLUCOSE SERPL-MCNC: 86 MG/DL (ref 70–200)
HEMATOCRIT: 41 % (ref 34–45)
HEMOGLOBIN: 12.7 G/DL (ref 11.2–15.7)
IMMATURE GRANULOCYTE ABSOLUTE: 0.04 10*3/UL (ref 0–0.05)
IMMATURE GRANULOCYTE PERCENT: 0.4 % (ref 0–0.5)
KETONES, URINE: NEGATIVE
LEUKOCYTE ESTERASE URINE: NEGATIVE
LIPASE SERPL-CCNC: 158 U/L (ref 10–250)
LYMPH PERCENT: 20.7 % (ref 20.5–51.1)
LYMPHOCYTE ABSOLUTE #: 2.2 10*3/UL (ref 1.2–3.4)
MEAN CORPUSCULAR HGB CONCENTRATION: 31 % (ref 32–36)
MEAN CORPUSCULAR HGB: 31.1 PG (ref 27–34)
MEAN CORPUSCULAR VOLUME: 100.2 FL (ref 79–95)
MEAN PLATELET VOLUME: 9.6 FL (ref 8.6–12.4)
MONOCYTE ABSOLUTE #: 0.6 10*3/UL (ref 0.2–0.9)
MONOCYTE PERCENT: 6 % (ref 4.3–12.9)
MUCOUS: NORMAL
NEUTROPHIL ABSOLUTE #: 7.5 10*3/UL (ref 1.4–6.5)
NEUTROPHIL PERCENT: 70.6 % (ref 34–73.5)
NITRITE URINE: NEGATIVE
PH URINE: 5 (ref 5–7)
PLATELET COUNT: 361 10*3/UL (ref 150–400)
POTASSIUM SERPL-SCNC: 4.4 MMOL/L (ref 3.5–5.3)
PROT SERPL-MCNC: 7.1 G/DL (ref 6.4–8.5)
RED BLOOD CELL COUNT: 4.09 10*6/UL (ref 3.7–5.2)
RED BLOOD CELLS URINE: NORMAL (ref 0–3)
RED CELL DISTRIBUTION WIDTH: 15.6 % (ref 11.3–14.8)
SARS-COV+SARS-COV-2 AG RESP QL IA.RAPID: NOT DETECTED
SODIUM SERPL-SCNC: 139 MMOL/L (ref 136–146)
SPECIFIC GRAVITY URINE: 1.03 (ref 1–1.03)
SQUAMOUS EPITHELIAL CELLS: NORMAL
URINE PROTEIN, QUAL (DIPSTICK): NEGATIVE
UROBILINOGEN URINE: <2
WHITE BLOOD CELL COUNT: 10.6 10*3/UL (ref 4–10)
WHITE BLOOD CELLS URINE: NORMAL (ref 0–5)

## 2021-12-16 PROCEDURE — 87086 URINE CULTURE/COLONY COUNT: CPT | Performed by: INTERNAL MEDICINE

## 2021-12-16 PROCEDURE — 99214 OFFICE O/P EST MOD 30 MIN: CPT | Performed by: INTERNAL MEDICINE

## 2021-12-16 PROCEDURE — 81003 URINALYSIS AUTO W/O SCOPE: CPT | Performed by: FAMILY MEDICINE

## 2021-12-16 PROCEDURE — 87088 URINE BACTERIA CULTURE: CPT | Performed by: INTERNAL MEDICINE

## 2021-12-16 PROCEDURE — 3077F SYST BP >= 140 MM HG: CPT | Performed by: INTERNAL MEDICINE

## 2021-12-16 PROCEDURE — 87045 FECES CULTURE AEROBIC BACT: CPT | Performed by: INTERNAL MEDICINE

## 2021-12-16 PROCEDURE — 36415 COLL VENOUS BLD VENIPUNCTURE: CPT | Performed by: INTERNAL MEDICINE

## 2021-12-16 PROCEDURE — 85025 COMPLETE CBC W/AUTO DIFF WBC: CPT | Performed by: INTERNAL MEDICINE

## 2021-12-16 PROCEDURE — 87186 SC STD MICRODIL/AGAR DIL: CPT | Performed by: INTERNAL MEDICINE

## 2021-12-16 PROCEDURE — 83690 ASSAY OF LIPASE: CPT | Performed by: INTERNAL MEDICINE

## 2021-12-16 PROCEDURE — 87328 CRYPTOSPORIDIUM AG IA: CPT | Performed by: INTERNAL MEDICINE

## 2021-12-16 PROCEDURE — 82150 ASSAY OF AMYLASE: CPT | Performed by: INTERNAL MEDICINE

## 2021-12-16 PROCEDURE — 3080F DIAST BP >= 90 MM HG: CPT | Performed by: INTERNAL MEDICINE

## 2021-12-16 PROCEDURE — 87329 GIARDIA AG IA: CPT | Performed by: INTERNAL MEDICINE

## 2021-12-16 PROCEDURE — 87426 SARSCOV CORONAVIRUS AG IA: CPT | Performed by: INTERNAL MEDICINE

## 2021-12-16 PROCEDURE — 87493 C DIFF AMPLIFIED PROBE: CPT | Performed by: INTERNAL MEDICINE

## 2021-12-16 PROCEDURE — 80053 COMPREHEN METABOLIC PANEL: CPT | Performed by: INTERNAL MEDICINE

## 2021-12-16 PROCEDURE — 87804 INFLUENZA ASSAY W/OPTIC: CPT | Performed by: INTERNAL MEDICINE

## 2021-12-16 PROCEDURE — 87899 AGENT NOS ASSAY W/OPTIC: CPT | Performed by: INTERNAL MEDICINE

## 2021-12-16 RX ORDER — ONDANSETRON HYDROCHLORIDE 8 MG/1
8 TABLET, FILM COATED ORAL EVERY 8 HOURS PRN
Qty: 10 TABLET | Refills: 0 | Status: SHIPPED | OUTPATIENT
Start: 2021-12-16 | End: 2021-12-21

## 2021-12-16 ASSESSMENT — PAIN SCALES - GENERAL: PAINLEVEL: 7

## 2021-12-17 ENCOUNTER — TELEPHONE (OUTPATIENT)
Dept: INTERNAL MEDICINE | Age: 66
End: 2021-12-17

## 2021-12-17 LAB
C DIFF TOX A+B STL QL IA: NEGATIVE
C PARVUM AG STL QL IA: NEGATIVE
G LAMBLIA AG STL QL IA: NEGATIVE

## 2021-12-18 LAB
FINAL REPORT: ABNORMAL
FINAL REPORT: NORMAL

## 2021-12-18 RX ORDER — CEPHALEXIN 500 MG/1
500 CAPSULE ORAL 3 TIMES DAILY
Qty: 21 CAPSULE | Refills: 0 | Status: SHIPPED | OUTPATIENT
Start: 2021-12-18 | End: 2022-04-15 | Stop reason: ALTCHOICE

## 2021-12-22 DIAGNOSIS — I10 ESSENTIAL HYPERTENSION: ICD-10-CM

## 2021-12-22 DIAGNOSIS — I71.40 ABDOMINAL AORTIC ANEURYSM WITHOUT RUPTURE (CMD): ICD-10-CM

## 2021-12-22 DIAGNOSIS — I25.119 CORONARY ARTERY DISEASE INVOLVING NATIVE CORONARY ARTERY OF NATIVE HEART WITH ANGINA PECTORIS (CMD): ICD-10-CM

## 2022-02-07 ENCOUNTER — LAB SERVICES (OUTPATIENT)
Dept: LAB | Age: 67
End: 2022-02-07

## 2022-02-07 DIAGNOSIS — N18.32 STAGE 3B CHRONIC KIDNEY DISEASE (CMD): ICD-10-CM

## 2022-02-07 DIAGNOSIS — I12.9 NEPHROSCLEROSIS ARTERIOLAR, STAGE 1-4 OR UNSPECIFIED CHRONIC KIDNEY DISEASE: ICD-10-CM

## 2022-02-07 DIAGNOSIS — N18.31 STAGE 3A CHRONIC KIDNEY DISEASE (CMD): ICD-10-CM

## 2022-02-07 DIAGNOSIS — N18.31 ANEMIA OF CHRONIC RENAL FAILURE, STAGE 3A (CMD): ICD-10-CM

## 2022-02-07 DIAGNOSIS — E87.5 HYPERKALEMIA: ICD-10-CM

## 2022-02-07 DIAGNOSIS — I10 ESSENTIAL HYPERTENSION: ICD-10-CM

## 2022-02-07 DIAGNOSIS — D63.1 ANEMIA OF CHRONIC RENAL FAILURE, STAGE 3A (CMD): ICD-10-CM

## 2022-02-07 DIAGNOSIS — I70.1 RENAL ARTERY STENOSIS (CMD): ICD-10-CM

## 2022-02-07 DIAGNOSIS — N20.0 KIDNEY STONE: ICD-10-CM

## 2022-02-07 LAB
BUN SERPL-MCNC: 18 MG/DL (ref 7–20)
CALCIUM SERPL-MCNC: 9.7 MG/DL (ref 8.6–10.6)
CALCIUM SERPL-MCNC: 9.7 MG/DL (ref 8.6–10.6)
CHLORIDE SERPL-SCNC: 115 MMOL/L (ref 96–107)
CO2 SERPL-SCNC: 18 MMOL/L (ref 22–32)
CREAT SERPL-MCNC: 1.3 MG/DL (ref 0.5–1.4)
GFR SERPL CREATININE-BSD FRML MDRD: 41 ML/MIN/{1.73M2}
GFR SERPL CREATININE-BSD FRML MDRD: 49 ML/MIN/{1.73M2}
GLUCOSE SERPL-MCNC: 82 MG/DL (ref 70–200)
HEMATOCRIT: 41.3 % (ref 34–45)
HEMOGLOBIN: 12.9 G/DL (ref 11.2–15.7)
PHOSPHATE SERPL-MCNC: 4.1 MG/DL (ref 2.5–4.9)
POTASSIUM SERPL-SCNC: 4.9 MMOL/L (ref 3.5–5.3)
PTH-INTACT SERPL-MCNC: 72.6 PG/ML (ref 23–73)
SODIUM SERPL-SCNC: 141 MMOL/L (ref 136–146)

## 2022-02-07 PROCEDURE — 80048 BASIC METABOLIC PNL TOTAL CA: CPT | Performed by: INTERNAL MEDICINE

## 2022-02-07 PROCEDURE — 85014 HEMATOCRIT: CPT | Performed by: INTERNAL MEDICINE

## 2022-02-07 PROCEDURE — 85018 HEMOGLOBIN: CPT | Performed by: INTERNAL MEDICINE

## 2022-02-07 PROCEDURE — 84100 ASSAY OF PHOSPHORUS: CPT | Performed by: INTERNAL MEDICINE

## 2022-02-07 PROCEDURE — 83970 ASSAY OF PARATHORMONE: CPT | Performed by: INTERNAL MEDICINE

## 2022-02-07 PROCEDURE — 36415 COLL VENOUS BLD VENIPUNCTURE: CPT | Performed by: INTERNAL MEDICINE

## 2022-02-09 ENCOUNTER — OFFICE VISIT (OUTPATIENT)
Dept: NEPHROLOGY | Age: 67
End: 2022-02-09

## 2022-02-09 VITALS
SYSTOLIC BLOOD PRESSURE: 140 MMHG | BODY MASS INDEX: 23.46 KG/M2 | HEART RATE: 111 BPM | DIASTOLIC BLOOD PRESSURE: 80 MMHG | WEIGHT: 141 LBS

## 2022-02-09 DIAGNOSIS — N28.1 RENAL CYST: ICD-10-CM

## 2022-02-09 DIAGNOSIS — I70.1 BILATERAL RENAL ARTERY STENOSIS (CMD): ICD-10-CM

## 2022-02-09 DIAGNOSIS — N20.0 KIDNEY STONE: ICD-10-CM

## 2022-02-09 DIAGNOSIS — R00.0 TACHYCARDIA: ICD-10-CM

## 2022-02-09 DIAGNOSIS — D63.1 ANEMIA OF CHRONIC RENAL FAILURE, STAGE 3B (CMD): ICD-10-CM

## 2022-02-09 DIAGNOSIS — N18.32 ANEMIA OF CHRONIC RENAL FAILURE, STAGE 3B (CMD): ICD-10-CM

## 2022-02-09 DIAGNOSIS — I12.9 NEPHROSCLEROSIS ARTERIOLAR, STAGE 1-4 OR UNSPECIFIED CHRONIC KIDNEY DISEASE: ICD-10-CM

## 2022-02-09 DIAGNOSIS — I10 ESSENTIAL HYPERTENSION: ICD-10-CM

## 2022-02-09 DIAGNOSIS — N18.32 STAGE 3B CHRONIC KIDNEY DISEASE (CMD): Primary | ICD-10-CM

## 2022-02-09 PROCEDURE — 3077F SYST BP >= 140 MM HG: CPT | Performed by: INTERNAL MEDICINE

## 2022-02-09 PROCEDURE — 3079F DIAST BP 80-89 MM HG: CPT | Performed by: INTERNAL MEDICINE

## 2022-02-09 PROCEDURE — 99214 OFFICE O/P EST MOD 30 MIN: CPT | Performed by: INTERNAL MEDICINE

## 2022-02-10 DIAGNOSIS — I10 ESSENTIAL HYPERTENSION: Primary | ICD-10-CM

## 2022-02-10 DIAGNOSIS — R00.0 TACHYCARDIA: ICD-10-CM

## 2022-03-22 RX ORDER — ATORVASTATIN CALCIUM 80 MG/1
80 TABLET, FILM COATED ORAL DAILY
Qty: 90 TABLET | Refills: 3 | Status: SHIPPED | OUTPATIENT
Start: 2022-03-22 | End: 2023-01-03 | Stop reason: SDUPTHER

## 2022-04-14 ENCOUNTER — LAB SERVICES (OUTPATIENT)
Dept: LAB | Age: 67
End: 2022-04-14

## 2022-04-14 ENCOUNTER — NURSE TRIAGE (OUTPATIENT)
Dept: INTERNAL MEDICINE | Age: 67
End: 2022-04-14

## 2022-04-14 DIAGNOSIS — R35.0 INCREASED FREQUENCY OF URINATION: ICD-10-CM

## 2022-04-14 DIAGNOSIS — R30.0 BURNING WITH URINATION: ICD-10-CM

## 2022-04-14 DIAGNOSIS — R30.0 BURNING WITH URINATION: Primary | ICD-10-CM

## 2022-04-14 LAB
BILIRUBIN URINE: NEGATIVE
BLOOD URINE: NEGATIVE
CLARITY: ABNORMAL
COLOR: YELLOW
GLUCOSE QUALITATIVE U: NEGATIVE
HYALINE CAST: ABNORMAL
KETONES, URINE: NEGATIVE
LEUKOCYTE ESTERASE URINE: NEGATIVE
MUCOUS: ABNORMAL
NITRITE URINE: NEGATIVE
PH URINE: 5 (ref 5–7)
RED BLOOD CELLS URINE: ABNORMAL (ref 0–3)
SPECIFIC GRAVITY URINE: 1.04 (ref 1–1.03)
SQUAMOUS EPITHELIAL CELLS: ABNORMAL
URINE PROTEIN, QUAL (DIPSTICK): 30
UROBILINOGEN URINE: <2
WHITE BLOOD CELLS URINE: ABNORMAL (ref 0–5)

## 2022-04-14 PROCEDURE — 81003 URINALYSIS AUTO W/O SCOPE: CPT | Performed by: FAMILY MEDICINE

## 2022-04-14 PROCEDURE — 87086 URINE CULTURE/COLONY COUNT: CPT | Performed by: FAMILY MEDICINE

## 2022-04-14 PROCEDURE — 87088 URINE BACTERIA CULTURE: CPT | Performed by: FAMILY MEDICINE

## 2022-04-14 PROCEDURE — 87186 SC STD MICRODIL/AGAR DIL: CPT | Performed by: FAMILY MEDICINE

## 2022-04-15 ENCOUNTER — LAB SERVICES (OUTPATIENT)
Dept: LAB | Age: 67
End: 2022-04-15

## 2022-04-15 ENCOUNTER — TELEPHONE (OUTPATIENT)
Dept: INTERNAL MEDICINE | Age: 67
End: 2022-04-15

## 2022-04-15 ENCOUNTER — OFFICE VISIT (OUTPATIENT)
Dept: INTERNAL MEDICINE | Age: 67
End: 2022-04-15

## 2022-04-15 VITALS
OXYGEN SATURATION: 98 % | SYSTOLIC BLOOD PRESSURE: 134 MMHG | HEART RATE: 70 BPM | RESPIRATION RATE: 16 BRPM | BODY MASS INDEX: 23.3 KG/M2 | WEIGHT: 140 LBS | DIASTOLIC BLOOD PRESSURE: 82 MMHG

## 2022-04-15 DIAGNOSIS — N39.0 URINARY TRACT INFECTION WITHOUT HEMATURIA, SITE UNSPECIFIED: Primary | ICD-10-CM

## 2022-04-15 DIAGNOSIS — E78.5 HYPERLIPIDEMIA, UNSPECIFIED HYPERLIPIDEMIA TYPE: ICD-10-CM

## 2022-04-15 DIAGNOSIS — N18.32 STAGE 3B CHRONIC KIDNEY DISEASE (CMD): ICD-10-CM

## 2022-04-15 DIAGNOSIS — R19.7 DIARRHEA, UNSPECIFIED TYPE: ICD-10-CM

## 2022-04-15 DIAGNOSIS — I10 ESSENTIAL HYPERTENSION: ICD-10-CM

## 2022-04-15 PROCEDURE — 3079F DIAST BP 80-89 MM HG: CPT | Performed by: FAMILY MEDICINE

## 2022-04-15 PROCEDURE — 99214 OFFICE O/P EST MOD 30 MIN: CPT | Performed by: FAMILY MEDICINE

## 2022-04-15 PROCEDURE — 3075F SYST BP GE 130 - 139MM HG: CPT | Performed by: FAMILY MEDICINE

## 2022-04-15 RX ORDER — AMOXICILLIN 500 MG/1
TABLET, FILM COATED ORAL
COMMUNITY
Start: 2022-01-12 | End: 2022-04-18 | Stop reason: ALTCHOICE

## 2022-04-15 ASSESSMENT — PATIENT HEALTH QUESTIONNAIRE - PHQ9
SUM OF ALL RESPONSES TO PHQ9 QUESTIONS 1 AND 2: 0
CLINICAL INTERPRETATION OF PHQ2 SCORE: NO FURTHER SCREENING NEEDED
2. FEELING DOWN, DEPRESSED OR HOPELESS: NOT AT ALL
1. LITTLE INTEREST OR PLEASURE IN DOING THINGS: NOT AT ALL
SUM OF ALL RESPONSES TO PHQ9 QUESTIONS 1 AND 2: 0

## 2022-04-16 LAB — FINAL REPORT: ABNORMAL

## 2022-04-18 RX ORDER — CEPHALEXIN 500 MG/1
500 CAPSULE ORAL 3 TIMES DAILY
Qty: 21 CAPSULE | Refills: 0 | Status: SHIPPED | OUTPATIENT
Start: 2022-04-18 | End: 2022-04-25

## 2022-06-02 LAB
*MEAN CORPUSCULAR HGB CONC: 32.8 G/DL (ref 32.5–35.8)
A/G RATIO: 1.68 (ref 1.1–2.4)
ALANINE AMINOTRANSFE: 25 U/L (ref 7–52)
ALBUMIN, SERUM (ALB): 4.2 G/DL (ref 3.5–5.7)
ALKALINE PHOSPHATASE (ALK): 74 U/L (ref 34–104)
ANION GAP: 10 MEQ/L (ref 6.2–14.7)
APPEARANCE: ABNORMAL
ASPARTATE AMINOTRANS: 34 U/L (ref 13–39)
BACTERIA: ABNORMAL /HPF
BASOPHIL AUTOMATED: 0.6 %
BASOPHILS: 0 (ref 0–0.14)
BILIRUBIN, TOTAL: 0.3 MG/DL (ref 0.2–0.8)
BILIRUBIN: ABNORMAL
BLOOD UREA NITROGEN (BUN): 14 MG/DL (ref 7–25)
BUN/CREATININE RATIO: 11.4 (ref 7.4–23)
CALCIUM, SERUM: 8.9 MG/DL (ref 8.6–10.3)
CARBON DIOXIDE: 19 MEQ/L (ref 21–31)
CHLORIDE, SERUM: 110 MMOL/L (ref 98–107)
CHRONIC KIDNEY DISEASE STAGE: ABNORMAL
COLOR: YELLOW
COVID-19 BY PCR (3 PLEX): DETECTED
CREATININE: 1.23 MG/DL (ref 0.6–1.2)
CULTURE REFLEX COMMENT: YES
EOSINOPHIL AUTOMATED: 0.4 %
EOSINOPHILS: 0 (ref 0–0.6)
EST GLOMERULAR FILTRATION RATE: 48 ML/MIN
GLUCOSE, URINE, AUTOMATED: ABNORMAL MG/DL
GLUCOSE: 97 MG/DL (ref 70–99)
HEMATOCRIT: 36.5 % (ref 34.7–45.1)
HEMOGLOBIN: 11.9 GM/DL (ref 12–15.3)
INFLUENZA A BY PCR (3 PLEX): NOT DETECTED
INFLUENZA B BY PCR (3 PLEX): NOT DETECTED
K (POTASSIUM, SERUM): 4 MMOL/L (ref 3.5–5.2)
KETONES, URINE, AUTOMATED: ABNORMAL MG/DL
LEUKOCYTE, URINE, AUTOMATED: ABNORMAL
LYMPHOCYTE AUTOMATED: 5.8 %
LYMPHOCYTES: 0.4 (ref 0.78–3.73)
MEAN CORPUSCULAR HGB: 33 PG (ref 26–34)
MEAN CORPUSCULAR VOL: 100.7 FL (ref 80–100)
MEAN PLATELET VOLUME: 7.8 FL (ref 6.8–10.2)
MONOCYTE AUTOMATED: 16.7 %
MONOCYTES: 1.2 (ref 0.17–1)
NA (SODIUM, SERUM): 139 MMOL/L (ref 133–144)
NEUTROPHIL ABSOLUTE: 5.6 (ref 1.91–7.6)
NEUTROPHIL AUTOMATED: 76.5 %
NITRITE, URINE AUTOMATED: NEGATIVE
PH, URINE: 5 (ref 5–8)
PLATELET COUNT: 300 K/MM3 (ref 150–450)
PROTEIN TOTAL: 6.7 G/DL (ref 6.4–8.9)
PROTEIN, URINE: 30 MG/DL
RBC: ABNORMAL /HPF (ref 0–2)
RED BLOOD CELL COUNT: 3.62 M/MM3 (ref 3.63–5.04)
RED CELL DISTRIBUTIO: 14.5 % (ref 11.9–15.9)
SPECIFIC GRAVITY UA: 1.02 (ref 1–1.03)
SQUAMOUS EPITHELIAL: ABNORMAL /LPF
URINE, BLOOD, AUTOMATED: ABNORMAL
UROBILINOGEN, URINE, AUTOMATED: ABNORMAL MG/DL
WBC: >100 /HPF (ref 0–5)
WHITE BLOOD CELL COU: 7.3 K/MM3 (ref 4–11)

## 2022-06-02 PROCEDURE — 93010 ELECTROCARDIOGRAM REPORT: CPT | Performed by: INTERNAL MEDICINE

## 2022-06-03 ENCOUNTER — NURSE TRIAGE (OUTPATIENT)
Dept: INTERNAL MEDICINE | Age: 67
End: 2022-06-03

## 2022-06-04 LAB
AMOXACILLIN/CLAVULANATE: ABNORMAL
AMPICILLIN/SULBACTAM: ABNORMAL
AMPICILLIN: <=8
CEFAZOLIN: <=2
CEFEPIME: <=2
CEFOXITIN: <=8
CEFTRIAXONE: <=1
CEFUROXIME: <=4
CIPROFLOXACIN: <=0.25
GENTAMICIN: <=2
LEVOFLOXACIN: <=0.5
NITROFURANTOIN: <=32
TETRACYCLINE: <=4
TOBRAMYCIN: <=2
TRIMETHOPRIM/SULFAMETHOXAZOLE: ABNORMAL
URINE CULTURE: NORMAL
URINE CULTURE: NORMAL

## 2022-06-06 ENCOUNTER — APPOINTMENT (OUTPATIENT)
Dept: CARDIOLOGY | Age: 67
End: 2022-06-06

## 2022-06-13 ENCOUNTER — NURSE TRIAGE (OUTPATIENT)
Dept: INTERNAL MEDICINE | Age: 67
End: 2022-06-13

## 2022-06-13 ENCOUNTER — TELEPHONE (OUTPATIENT)
Dept: SCHEDULING | Age: 67
End: 2022-06-13

## 2022-06-14 ENCOUNTER — TELEPHONE (OUTPATIENT)
Dept: FAMILY MEDICINE | Age: 67
End: 2022-06-14

## 2022-06-14 ENCOUNTER — V-VISIT (OUTPATIENT)
Dept: INTERNAL MEDICINE | Age: 67
End: 2022-06-14

## 2022-06-14 DIAGNOSIS — N39.0 URINARY TRACT INFECTION WITHOUT HEMATURIA, SITE UNSPECIFIED: ICD-10-CM

## 2022-06-14 DIAGNOSIS — U07.1 COVID-19 VIRUS INFECTION: ICD-10-CM

## 2022-06-14 DIAGNOSIS — Z72.0 TOBACCO USE: ICD-10-CM

## 2022-06-14 DIAGNOSIS — N18.32 STAGE 3B CHRONIC KIDNEY DISEASE (CMD): ICD-10-CM

## 2022-06-14 DIAGNOSIS — R19.7 DIARRHEA, UNSPECIFIED TYPE: Primary | ICD-10-CM

## 2022-06-14 PROCEDURE — 99214 OFFICE O/P EST MOD 30 MIN: CPT | Performed by: FAMILY MEDICINE

## 2022-06-15 ENCOUNTER — LAB SERVICES (OUTPATIENT)
Dept: LAB | Age: 67
End: 2022-06-15

## 2022-06-15 ENCOUNTER — TELEPHONE (OUTPATIENT)
Dept: INTERNAL MEDICINE | Age: 67
End: 2022-06-15

## 2022-06-15 DIAGNOSIS — R19.7 DIARRHEA, UNSPECIFIED TYPE: ICD-10-CM

## 2022-06-15 PROCEDURE — 87899 AGENT NOS ASSAY W/OPTIC: CPT | Performed by: FAMILY MEDICINE

## 2022-06-15 PROCEDURE — 87493 C DIFF AMPLIFIED PROBE: CPT | Performed by: FAMILY MEDICINE

## 2022-06-15 PROCEDURE — 87045 FECES CULTURE AEROBIC BACT: CPT | Performed by: FAMILY MEDICINE

## 2022-06-16 LAB — C DIFF TOX A+B STL QL IA: NEGATIVE

## 2022-06-17 ENCOUNTER — EXTERNAL RECORD (OUTPATIENT)
Dept: HEALTH INFORMATION MANAGEMENT | Facility: OTHER | Age: 67
End: 2022-06-17

## 2022-06-17 LAB
*MEAN CORPUSCULAR HGB CONC: 33.6 G/DL (ref 32.5–35.8)
A/G RATIO: 1.27 (ref 1.1–2.4)
ALANINE AMINOTRANSFE: 12 U/L (ref 7–52)
ALBUMIN, SERUM (ALB): 3.8 G/DL (ref 3.5–5.7)
ALKALINE PHOSPHATASE (ALK): 80 U/L (ref 34–104)
ANION GAP: 8 MEQ/L (ref 6.2–14.7)
APPEARANCE: CLEAR
ASPARTATE AMINOTRANS: 17 U/L (ref 13–39)
BASOPHIL AUTOMATED: 0.9 %
BASOPHILS: 0.1 (ref 0–0.14)
BILIRUBIN, TOTAL: 0.3 MG/DL (ref 0.2–0.8)
BILIRUBIN: ABNORMAL
BLOOD UREA NITROGEN (BUN): 13 MG/DL (ref 7–25)
BUN/CREATININE RATIO: 13.3 (ref 7.4–23)
CALCIUM, SERUM: 8.9 MG/DL (ref 8.6–10.3)
CARBON DIOXIDE: 21 MEQ/L (ref 21–31)
CHLORIDE, SERUM: 110 MMOL/L (ref 98–107)
COLOR: YELLOW
CREATININE: 0.98 MG/DL (ref 0.6–1.2)
EOSINOPHIL AUTOMATED: 2 %
EOSINOPHILS: 0.2 (ref 0–0.6)
EST GLOMERULAR FILTRATION RATE: > 60 ML/MIN
FINAL REPORT: ABNORMAL
GLUCOSE, URINE, AUTOMATED: ABNORMAL MG/DL
GLUCOSE: 77 MG/DL (ref 70–99)
HEMATOCRIT: 32.7 % (ref 34.7–45.1)
HEMOGLOBIN: 11 GM/DL (ref 12–15.3)
K (POTASSIUM, SERUM): 3.6 MMOL/L (ref 3.5–5.2)
KETONES, URINE, AUTOMATED: ABNORMAL MG/DL
LEUKOCYTE, URINE, AUTOMATED: NEGATIVE
LIPASE: 36 U/L (ref 11–82)
LYMPHOCYTE AUTOMATED: 19.1 %
LYMPHOCYTES: 1.7 (ref 0.78–3.73)
MEAN CORPUSCULAR HGB: 33.2 PG (ref 26–34)
MEAN CORPUSCULAR VOL: 98.8 FL (ref 80–100)
MEAN PLATELET VOLUME: 7.3 FL (ref 6.8–10.2)
MONOCYTE AUTOMATED: 8.7 %
MONOCYTES: 0.8 (ref 0.17–1)
NA (SODIUM, SERUM): 139 MMOL/L (ref 133–144)
NEUTROPHIL ABSOLUTE: 6.2 (ref 1.91–7.6)
NEUTROPHIL AUTOMATED: 69.3 %
NITRITE, URINE AUTOMATED: NEGATIVE
PH, URINE: 5 (ref 5–8)
PLATELET COUNT: 549 K/MM3 (ref 150–450)
PROTEIN TOTAL: 6.8 G/DL (ref 6.4–8.9)
PROTEIN, URINE: ABNORMAL MG/DL
RED BLOOD CELL COUNT: 3.31 M/MM3 (ref 3.63–5.04)
RED CELL DISTRIBUTIO: 14.4 % (ref 11.9–15.9)
SPECIFIC GRAVITY UA: 1.03 (ref 1–1.03)
URINE, BLOOD, AUTOMATED: ABNORMAL
UROBILINOGEN, URINE, AUTOMATED: ABNORMAL MG/DL
WHITE BLOOD CELL COU: 8.9 K/MM3 (ref 4–11)

## 2022-06-20 ENCOUNTER — TELEPHONE (OUTPATIENT)
Dept: NEPHROLOGY | Age: 67
End: 2022-06-20

## 2022-06-21 ENCOUNTER — TELEPHONE (OUTPATIENT)
Dept: CARDIOLOGY | Age: 67
End: 2022-06-21

## 2022-06-22 ENCOUNTER — APPOINTMENT (OUTPATIENT)
Dept: NEPHROLOGY | Age: 67
End: 2022-06-22

## 2022-06-23 DIAGNOSIS — R91.1 LUNG NODULE: Primary | ICD-10-CM

## 2022-07-02 ENCOUNTER — IMAGING SERVICES (OUTPATIENT)
Dept: CT IMAGING | Age: 67
End: 2022-07-02
Attending: FAMILY MEDICINE

## 2022-07-02 DIAGNOSIS — R91.1 LUNG NODULE: ICD-10-CM

## 2022-07-02 PROCEDURE — 71250 CT THORAX DX C-: CPT | Performed by: RADIOLOGY

## 2022-07-02 PROCEDURE — G1004 CDSM NDSC: HCPCS | Performed by: RADIOLOGY

## 2022-07-05 ENCOUNTER — OFFICE VISIT (OUTPATIENT)
Dept: INTERNAL MEDICINE | Age: 67
End: 2022-07-05

## 2022-07-05 VITALS
HEART RATE: 76 BPM | WEIGHT: 132 LBS | HEIGHT: 66 IN | SYSTOLIC BLOOD PRESSURE: 122 MMHG | DIASTOLIC BLOOD PRESSURE: 84 MMHG | TEMPERATURE: 97.2 F | RESPIRATION RATE: 15 BRPM | BODY MASS INDEX: 21.21 KG/M2

## 2022-07-05 DIAGNOSIS — R19.7 DIARRHEA, UNSPECIFIED TYPE: Primary | ICD-10-CM

## 2022-07-05 DIAGNOSIS — E78.5 HYPERLIPIDEMIA, UNSPECIFIED HYPERLIPIDEMIA TYPE: ICD-10-CM

## 2022-07-05 DIAGNOSIS — I10 ESSENTIAL HYPERTENSION: ICD-10-CM

## 2022-07-05 DIAGNOSIS — I71.20 THORACIC AORTIC ANEURYSM WITHOUT RUPTURE (CMD): ICD-10-CM

## 2022-07-05 DIAGNOSIS — R91.1 LUNG NODULE: ICD-10-CM

## 2022-07-05 DIAGNOSIS — Z72.0 TOBACCO ABUSE DISORDER: ICD-10-CM

## 2022-07-05 PROCEDURE — 3074F SYST BP LT 130 MM HG: CPT | Performed by: FAMILY MEDICINE

## 2022-07-05 PROCEDURE — 3079F DIAST BP 80-89 MM HG: CPT | Performed by: FAMILY MEDICINE

## 2022-07-05 PROCEDURE — 99214 OFFICE O/P EST MOD 30 MIN: CPT | Performed by: FAMILY MEDICINE

## 2022-07-05 ASSESSMENT — PATIENT HEALTH QUESTIONNAIRE - PHQ9
CLINICAL INTERPRETATION OF PHQ2 SCORE: NO FURTHER SCREENING NEEDED
1. LITTLE INTEREST OR PLEASURE IN DOING THINGS: NOT AT ALL
SUM OF ALL RESPONSES TO PHQ9 QUESTIONS 1 AND 2: 0
2. FEELING DOWN, DEPRESSED OR HOPELESS: NOT AT ALL
SUM OF ALL RESPONSES TO PHQ9 QUESTIONS 1 AND 2: 0

## 2022-07-05 ASSESSMENT — PAIN SCALES - GENERAL: PAINLEVEL: 0

## 2022-07-12 ENCOUNTER — APPOINTMENT (OUTPATIENT)
Dept: NEPHROLOGY | Age: 67
End: 2022-07-12

## 2022-10-01 ENCOUNTER — IMAGING SERVICES (OUTPATIENT)
Dept: CT IMAGING | Age: 67
End: 2022-10-01
Attending: FAMILY MEDICINE

## 2022-10-01 DIAGNOSIS — R91.1 LUNG NODULE: ICD-10-CM

## 2022-10-01 PROCEDURE — G1004 CDSM NDSC: HCPCS | Performed by: RADIOLOGY

## 2022-10-01 PROCEDURE — 71250 CT THORAX DX C-: CPT | Performed by: RADIOLOGY

## 2022-10-03 ENCOUNTER — TELEPHONE (OUTPATIENT)
Dept: OTOLARYNGOLOGY | Age: 67
End: 2022-10-03

## 2022-10-03 ENCOUNTER — TELEPHONE (OUTPATIENT)
Dept: CARDIOLOGY | Age: 67
End: 2022-10-03

## 2022-10-03 DIAGNOSIS — R91.1 PULMONARY NODULE: Primary | ICD-10-CM

## 2022-10-03 DIAGNOSIS — I10 PRIMARY HYPERTENSION: Primary | ICD-10-CM

## 2022-10-04 ENCOUNTER — TELEPHONE (OUTPATIENT)
Dept: INTERNAL MEDICINE | Age: 67
End: 2022-10-04

## 2022-10-04 ENCOUNTER — ANCILLARY PROCEDURE (OUTPATIENT)
Dept: CARDIOLOGY | Age: 67
End: 2022-10-04
Attending: INTERNAL MEDICINE

## 2022-10-04 DIAGNOSIS — R91.1 PULMONARY NODULE 1 CM OR GREATER IN DIAMETER: Primary | ICD-10-CM

## 2022-10-04 DIAGNOSIS — I10 PRIMARY HYPERTENSION: ICD-10-CM

## 2022-10-04 LAB
AORTIC VALVE AREA (AVA): 0.84
AORTIC VALVE AREA: 3.19
AV MEAN GRADIENT (AVMG): 3.4
AV MEAN VELOCITY (AVMV): 0.89
AV PEAK GRADIENT (AVPG): 8
AV PEAK VELOCITY (AVPV): 1.42
AV STENOSIS SEVERITY TEXT: NORMAL
AVI LVOT PEAK GRADIENT (LVOTMG): 0.7
E WAVE DECELARATION TIME (MDT): 10.72
INTERVENTRICULAR SEPTUM IN END DIASTOLE (IVSD): 2.25
LEFT INTERNAL DIMENSION IN SYSTOLE (LVSD): 0.8
LEFT VENTRICULAR INTERNAL DIMENSION IN DIASTOLE (LVDD): 3
LEFT VENTRICULAR POSTERIOR WALL IN END DIASTOLE (LVPW): 4.3
LV EF: NORMAL %
LVOT 2D (LVOTD): 26.1
LVOT VTI (LVOTVTI): 1.25
MV E TISSUE VEL LAT (MELV): 0.78
MV E TISSUE VEL MED (MESV): 8.03
MV E WAVE VEL/E TISSUE VEL MED(MSR): 7.83
MV PEAK A VELOCITY (MVPAV): 254
MV PEAK E VELOCITY (MVPEV): 0.92
RV END SYSTOLIC LONGITUDINAL STRAIN FREE WALL (RVGS): 2.1
TRICUSPID VALVE PEAK REGURGITATION VELOCITY (TRPV): 3.5

## 2022-10-04 PROCEDURE — 93306 TTE W/DOPPLER COMPLETE: CPT | Performed by: INTERNAL MEDICINE

## 2022-10-06 ENCOUNTER — TELEPHONE (OUTPATIENT)
Dept: PULMONOLOGY | Age: 67
End: 2022-10-06

## 2022-10-06 ENCOUNTER — OFFICE VISIT (OUTPATIENT)
Dept: INTERNAL MEDICINE | Age: 67
End: 2022-10-06

## 2022-10-06 VITALS
RESPIRATION RATE: 14 BRPM | SYSTOLIC BLOOD PRESSURE: 140 MMHG | TEMPERATURE: 97.7 F | BODY MASS INDEX: 20.09 KG/M2 | HEART RATE: 68 BPM | WEIGHT: 125 LBS | DIASTOLIC BLOOD PRESSURE: 76 MMHG | HEIGHT: 66 IN

## 2022-10-06 DIAGNOSIS — E78.2 MIXED HYPERLIPIDEMIA: ICD-10-CM

## 2022-10-06 DIAGNOSIS — Z72.0 TOBACCO USE: ICD-10-CM

## 2022-10-06 DIAGNOSIS — I10 ESSENTIAL HYPERTENSION: ICD-10-CM

## 2022-10-06 DIAGNOSIS — R91.1 NODULE OF LEFT LUNG: Primary | ICD-10-CM

## 2022-10-06 PROCEDURE — 3077F SYST BP >= 140 MM HG: CPT | Performed by: FAMILY MEDICINE

## 2022-10-06 PROCEDURE — 3078F DIAST BP <80 MM HG: CPT | Performed by: FAMILY MEDICINE

## 2022-10-06 PROCEDURE — 99214 OFFICE O/P EST MOD 30 MIN: CPT | Performed by: FAMILY MEDICINE

## 2022-10-06 ASSESSMENT — PATIENT HEALTH QUESTIONNAIRE - PHQ9
SUM OF ALL RESPONSES TO PHQ9 QUESTIONS 1 AND 2: 0
SUM OF ALL RESPONSES TO PHQ9 QUESTIONS 1 AND 2: 0
2. FEELING DOWN, DEPRESSED OR HOPELESS: NOT AT ALL
CLINICAL INTERPRETATION OF PHQ2 SCORE: NO FURTHER SCREENING NEEDED
1. LITTLE INTEREST OR PLEASURE IN DOING THINGS: NOT AT ALL

## 2022-10-07 ENCOUNTER — TELEPHONE (OUTPATIENT)
Dept: CARDIOLOGY | Age: 67
End: 2022-10-07

## 2022-10-11 ENCOUNTER — TELEPHONE (OUTPATIENT)
Dept: CARDIOLOGY | Age: 67
End: 2022-10-11

## 2022-10-19 ENCOUNTER — IMAGING SERVICES (OUTPATIENT)
Dept: PET IMAGING | Age: 67
End: 2022-10-19
Attending: FAMILY MEDICINE

## 2022-10-19 ENCOUNTER — APPOINTMENT (OUTPATIENT)
Dept: PULMONOLOGY | Age: 67
End: 2022-10-19

## 2022-10-19 DIAGNOSIS — R91.1 PULMONARY NODULE 1 CM OR GREATER IN DIAMETER: ICD-10-CM

## 2022-10-19 PROCEDURE — A9552 F18 FDG: HCPCS | Performed by: RADIOLOGY

## 2022-10-19 PROCEDURE — 78815 PET IMAGE W/CT SKULL-THIGH: CPT | Performed by: RADIOLOGY

## 2022-10-19 PROCEDURE — X1094 NO CHARGE VISIT: HCPCS | Performed by: FAMILY MEDICINE

## 2022-10-19 PROCEDURE — G1004 CDSM NDSC: HCPCS | Performed by: RADIOLOGY

## 2022-10-19 RX ORDER — FLUDEOXYGLUCOSE F-18 300 MCI/ML
11.6 INJECTION INTRAVENOUS ONCE
Status: COMPLETED | OUTPATIENT
Start: 2022-10-19 | End: 2022-10-19

## 2022-10-19 RX ADMIN — FLUDEOXYGLUCOSE F-18 11.6 MILLICURIE: 300 INJECTION INTRAVENOUS at 08:27

## 2022-10-20 ENCOUNTER — OFFICE VISIT (OUTPATIENT)
Dept: PULMONOLOGY | Age: 67
End: 2022-10-20
Attending: FAMILY MEDICINE

## 2022-10-20 ENCOUNTER — E-ADVICE (OUTPATIENT)
Dept: INTERNAL MEDICINE | Age: 67
End: 2022-10-20

## 2022-10-20 ENCOUNTER — E-ADVICE (OUTPATIENT)
Dept: CARDIOLOGY | Age: 67
End: 2022-10-20

## 2022-10-20 VITALS
SYSTOLIC BLOOD PRESSURE: 122 MMHG | HEIGHT: 66 IN | BODY MASS INDEX: 20.09 KG/M2 | WEIGHT: 125 LBS | OXYGEN SATURATION: 98 % | HEART RATE: 79 BPM | DIASTOLIC BLOOD PRESSURE: 82 MMHG

## 2022-10-20 DIAGNOSIS — Z72.0 TOBACCO USE: Primary | ICD-10-CM

## 2022-10-20 DIAGNOSIS — R91.1 SOLITARY PULMONARY NODULE: ICD-10-CM

## 2022-10-20 PROCEDURE — 99204 OFFICE O/P NEW MOD 45 MIN: CPT | Performed by: INTERNAL MEDICINE

## 2022-10-20 PROCEDURE — 3079F DIAST BP 80-89 MM HG: CPT | Performed by: INTERNAL MEDICINE

## 2022-10-20 PROCEDURE — 3074F SYST BP LT 130 MM HG: CPT | Performed by: INTERNAL MEDICINE

## 2022-10-26 ENCOUNTER — TELEPHONE (OUTPATIENT)
Dept: PULMONOLOGY | Age: 67
End: 2022-10-26

## 2022-10-28 ENCOUNTER — LAB SERVICES (OUTPATIENT)
Dept: LAB | Age: 67
End: 2022-10-28

## 2022-10-28 DIAGNOSIS — Z72.0 TOBACCO USE: ICD-10-CM

## 2022-10-28 DIAGNOSIS — R91.1 SOLITARY PULMONARY NODULE: ICD-10-CM

## 2022-10-28 LAB
BASOPHIL %: 1 % (ref 0–1.2)
BASOPHIL ABSOLUTE #: 0.1 10*3/UL (ref 0–0.1)
DIFFERENTIAL TYPE: ABNORMAL
EOSINOPHIL %: 2.6 % (ref 0–10)
EOSINOPHIL ABSOLUTE #: 0.2 10*3/UL (ref 0–0.5)
HEMATOCRIT: 41.2 % (ref 34–45)
HEMOGLOBIN: 12.9 G/DL (ref 11.2–15.7)
IMMATURE GRANULOCYTE ABSOLUTE: 0.02 10*3/UL (ref 0–0.05)
IMMATURE GRANULOCYTE PERCENT: 0.2 % (ref 0–0.5)
INTERNATIONAL NORMALIZED RATIO: 1
LYMPH PERCENT: 26.2 % (ref 20.5–51.1)
LYMPHOCYTE ABSOLUTE #: 2.2 10*3/UL (ref 1.2–3.4)
MEAN CORPUSCULAR HGB CONCENTRATION: 31.3 % (ref 32–36)
MEAN CORPUSCULAR HGB: 32.6 PG (ref 27–34)
MEAN CORPUSCULAR VOLUME: 104 FL (ref 79–95)
MEAN PLATELET VOLUME: 10.7 FL (ref 8.6–12.4)
MONOCYTE ABSOLUTE #: 0.7 10*3/UL (ref 0.2–0.9)
MONOCYTE PERCENT: 8.3 % (ref 4.3–12.9)
NEUTROPHIL ABSOLUTE #: 5.2 10*3/UL (ref 1.4–6.5)
NEUTROPHIL PERCENT: 61.7 % (ref 34–73.5)
PLATELET COUNT: 373 10*3/UL (ref 150–400)
PROTHROMBIN TIME, THERAPEUTIC: 9.8 S (ref 9.8–12.1)
PTT: 24.3 S (ref 24–38)
RED BLOOD CELL COUNT: 3.96 10*6/UL (ref 3.7–5.2)
RED CELL DISTRIBUTION WIDTH: 14.6 % (ref 11.3–14.8)
WHITE BLOOD CELL COUNT: 8.4 10*3/UL (ref 4–10)

## 2022-10-28 PROCEDURE — 85730 THROMBOPLASTIN TIME PARTIAL: CPT | Performed by: INTERNAL MEDICINE

## 2022-10-28 PROCEDURE — 36415 COLL VENOUS BLD VENIPUNCTURE: CPT | Performed by: INTERNAL MEDICINE

## 2022-10-28 PROCEDURE — 85610 PROTHROMBIN TIME: CPT | Performed by: INTERNAL MEDICINE

## 2022-10-28 PROCEDURE — 85025 COMPLETE CBC W/AUTO DIFF WBC: CPT | Performed by: INTERNAL MEDICINE

## 2022-11-01 PROBLEM — Z86.16 HISTORY OF COVID-19: Status: ACTIVE | Noted: 2022-06-01

## 2022-11-03 ASSESSMENT — ENCOUNTER SYMPTOMS
CONSTITUTIONAL NEGATIVE: 1
GASTROINTESTINAL NEGATIVE: 1
NEUROLOGICAL NEGATIVE: 1
RESPIRATORY NEGATIVE: 1
EYES NEGATIVE: 1
PSYCHIATRIC NEGATIVE: 1
ENDOCRINE NEGATIVE: 1
HEMATOLOGIC/LYMPHATIC NEGATIVE: 1

## 2022-11-05 ENCOUNTER — OFFICE VISIT (OUTPATIENT)
Dept: CARDIOLOGY | Age: 67
End: 2022-11-05

## 2022-11-05 VITALS
BODY MASS INDEX: 20.6 KG/M2 | WEIGHT: 128.2 LBS | DIASTOLIC BLOOD PRESSURE: 78 MMHG | OXYGEN SATURATION: 99 % | SYSTOLIC BLOOD PRESSURE: 126 MMHG | HEART RATE: 68 BPM | HEIGHT: 66 IN

## 2022-11-05 DIAGNOSIS — E78.2 MIXED HYPERLIPIDEMIA: ICD-10-CM

## 2022-11-05 DIAGNOSIS — N18.32 STAGE 3B CHRONIC KIDNEY DISEASE (CMD): ICD-10-CM

## 2022-11-05 DIAGNOSIS — Z98.890 S/P CARDIAC CATH: ICD-10-CM

## 2022-11-05 DIAGNOSIS — Z95.828 HISTORY OF ENDOVASCULAR STENT GRAFT FOR ABDOMINAL AORTIC ANEURYSM (AAA): Primary | ICD-10-CM

## 2022-11-05 DIAGNOSIS — G43.809 OTHER MIGRAINE WITHOUT STATUS MIGRAINOSUS, NOT INTRACTABLE: ICD-10-CM

## 2022-11-05 DIAGNOSIS — I10 PRIMARY HYPERTENSION: ICD-10-CM

## 2022-11-05 DIAGNOSIS — Z86.16 HISTORY OF COVID-19: ICD-10-CM

## 2022-11-05 PROCEDURE — 3078F DIAST BP <80 MM HG: CPT | Performed by: INTERNAL MEDICINE

## 2022-11-05 PROCEDURE — 99214 OFFICE O/P EST MOD 30 MIN: CPT | Performed by: INTERNAL MEDICINE

## 2022-11-05 PROCEDURE — 3074F SYST BP LT 130 MM HG: CPT | Performed by: INTERNAL MEDICINE

## 2022-11-07 ENCOUNTER — TELEPHONE (OUTPATIENT)
Dept: CARDIOLOGY | Age: 67
End: 2022-11-07

## 2022-11-07 ENCOUNTER — EXTERNAL LAB (OUTPATIENT)
Dept: OTHER | Facility: PHYSICIAN GROUP | Age: 67
End: 2022-11-07

## 2022-11-07 LAB — LAB RESULT: NORMAL

## 2022-11-11 DIAGNOSIS — Z72.0 TOBACCO USE: Primary | ICD-10-CM

## 2022-11-11 DIAGNOSIS — R91.1 SOLITARY PULMONARY NODULE: ICD-10-CM

## 2022-11-15 ENCOUNTER — OFFICE VISIT (OUTPATIENT)
Dept: PULMONOLOGY | Age: 67
End: 2022-11-15

## 2022-11-15 VITALS
BODY MASS INDEX: 20.09 KG/M2 | OXYGEN SATURATION: 100 % | DIASTOLIC BLOOD PRESSURE: 84 MMHG | HEIGHT: 66 IN | SYSTOLIC BLOOD PRESSURE: 134 MMHG | WEIGHT: 125 LBS | HEART RATE: 62 BPM

## 2022-11-15 DIAGNOSIS — R91.1 SOLITARY PULMONARY NODULE: Primary | ICD-10-CM

## 2022-11-15 PROCEDURE — 99214 OFFICE O/P EST MOD 30 MIN: CPT | Performed by: INTERNAL MEDICINE

## 2022-11-15 PROCEDURE — 3079F DIAST BP 80-89 MM HG: CPT | Performed by: INTERNAL MEDICINE

## 2022-11-15 PROCEDURE — 3075F SYST BP GE 130 - 139MM HG: CPT | Performed by: INTERNAL MEDICINE

## 2022-11-24 DIAGNOSIS — I25.119 CORONARY ARTERY DISEASE INVOLVING NATIVE CORONARY ARTERY OF NATIVE HEART WITH ANGINA PECTORIS (CMD): ICD-10-CM

## 2022-11-24 DIAGNOSIS — I71.40 ABDOMINAL AORTIC ANEURYSM WITHOUT RUPTURE (CMD): ICD-10-CM

## 2022-11-24 DIAGNOSIS — I10 ESSENTIAL HYPERTENSION: ICD-10-CM

## 2022-11-29 ENCOUNTER — LAB SERVICES (OUTPATIENT)
Dept: LAB | Age: 67
End: 2022-11-29

## 2022-11-29 DIAGNOSIS — R10.30 LOWER ABDOMINAL PAIN: ICD-10-CM

## 2022-11-29 DIAGNOSIS — R35.0 FREQUENCY OF URINATION: ICD-10-CM

## 2022-11-29 DIAGNOSIS — R10.9 FLANK PAIN: ICD-10-CM

## 2022-11-29 DIAGNOSIS — R68.83 CHILLS: ICD-10-CM

## 2022-11-29 DIAGNOSIS — R39.15 URINARY URGENCY: ICD-10-CM

## 2022-11-29 LAB
APPEARANCE UR: CLEAR
BACTERIA #/AREA URNS HPF: ABNORMAL /HPF
BILIRUB UR QL STRIP: NEGATIVE
COLOR UR: YELLOW
GLUCOSE UR STRIP-MCNC: NEGATIVE MG/DL
HGB UR QL STRIP: ABNORMAL
HYALINE CASTS #/AREA URNS LPF: ABNORMAL /LPF
KETONES UR STRIP-MCNC: NEGATIVE MG/DL
LEUKOCYTE ESTERASE UR QL STRIP: NEGATIVE
MUCOUS THREADS URNS QL MICRO: PRESENT
NITRITE UR QL STRIP: NEGATIVE
PH UR STRIP: 5.5 [PH] (ref 5–7)
PROT UR STRIP-MCNC: ABNORMAL MG/DL
RBC #/AREA URNS HPF: ABNORMAL /HPF
SP GR UR STRIP: 1.02 (ref 1–1.03)
SQUAMOUS #/AREA URNS HPF: ABNORMAL /HPF
UROBILINOGEN UR STRIP-MCNC: 0.2 MG/DL
WBC #/AREA URNS HPF: ABNORMAL /HPF

## 2022-11-29 PROCEDURE — 81001 URINALYSIS AUTO W/SCOPE: CPT | Performed by: INTERNAL MEDICINE

## 2022-11-30 ENCOUNTER — CLINICAL DOCUMENTATION (OUTPATIENT)
Dept: PULMONOLOGY | Age: 67
End: 2022-11-30

## 2022-11-30 DIAGNOSIS — J43.9 PULMONARY EMPHYSEMA, UNSPECIFIED EMPHYSEMA TYPE (CMD): Primary | ICD-10-CM

## 2022-11-30 RX ORDER — FLUTICASONE FUROATE AND VILANTEROL 100; 25 UG/1; UG/1
1 POWDER RESPIRATORY (INHALATION) DAILY
Qty: 28 EACH | Refills: 3 | Status: SHIPPED | OUTPATIENT
Start: 2022-11-30 | End: 2023-06-13 | Stop reason: ALTCHOICE

## 2022-11-30 RX ORDER — ALBUTEROL SULFATE 90 UG/1
2 AEROSOL, METERED RESPIRATORY (INHALATION) EVERY 4 HOURS PRN
Qty: 1 EACH | Refills: 5 | Status: SHIPPED | OUTPATIENT
Start: 2022-11-30 | End: 2023-06-13 | Stop reason: ALTCHOICE

## 2022-12-06 ENCOUNTER — OFFICE VISIT (OUTPATIENT)
Dept: INTERNAL MEDICINE | Age: 67
End: 2022-12-06

## 2022-12-06 VITALS
SYSTOLIC BLOOD PRESSURE: 140 MMHG | HEART RATE: 68 BPM | WEIGHT: 130 LBS | RESPIRATION RATE: 17 BRPM | DIASTOLIC BLOOD PRESSURE: 84 MMHG | HEIGHT: 66 IN | BODY MASS INDEX: 20.89 KG/M2 | TEMPERATURE: 96.1 F

## 2022-12-06 DIAGNOSIS — E78.5 HYPERLIPIDEMIA, UNSPECIFIED HYPERLIPIDEMIA TYPE: ICD-10-CM

## 2022-12-06 DIAGNOSIS — Z72.0 TOBACCO USE: ICD-10-CM

## 2022-12-06 DIAGNOSIS — R31.29 HEMATURIA, MICROSCOPIC: ICD-10-CM

## 2022-12-06 DIAGNOSIS — I10 ESSENTIAL HYPERTENSION: ICD-10-CM

## 2022-12-06 DIAGNOSIS — R91.1 NODULE OF LEFT LUNG: Primary | ICD-10-CM

## 2022-12-06 DIAGNOSIS — N18.31 STAGE 3A CHRONIC KIDNEY DISEASE (CMD): ICD-10-CM

## 2022-12-06 PROCEDURE — 3077F SYST BP >= 140 MM HG: CPT | Performed by: FAMILY MEDICINE

## 2022-12-06 PROCEDURE — 3079F DIAST BP 80-89 MM HG: CPT | Performed by: FAMILY MEDICINE

## 2022-12-06 PROCEDURE — 99214 OFFICE O/P EST MOD 30 MIN: CPT | Performed by: FAMILY MEDICINE

## 2022-12-06 RX ORDER — BUTALBITAL, ACETAMINOPHEN AND CAFFEINE 50; 325; 40 MG/1; MG/1; MG/1
TABLET ORAL EVERY 8 HOURS PRN
COMMUNITY
Start: 2022-11-03

## 2022-12-06 ASSESSMENT — PATIENT HEALTH QUESTIONNAIRE - PHQ9
2. FEELING DOWN, DEPRESSED OR HOPELESS: NOT AT ALL
CLINICAL INTERPRETATION OF PHQ2 SCORE: NO FURTHER SCREENING NEEDED
SUM OF ALL RESPONSES TO PHQ9 QUESTIONS 1 AND 2: 0
SUM OF ALL RESPONSES TO PHQ9 QUESTIONS 1 AND 2: 0
1. LITTLE INTEREST OR PLEASURE IN DOING THINGS: NOT AT ALL

## 2023-01-03 RX ORDER — ATORVASTATIN CALCIUM 80 MG/1
80 TABLET, FILM COATED ORAL DAILY
Qty: 90 TABLET | Refills: 3 | Status: SHIPPED | OUTPATIENT
Start: 2023-01-03 | End: 2023-10-12 | Stop reason: SDUPTHER

## 2023-01-06 ENCOUNTER — TELEPHONE (OUTPATIENT)
Dept: CARDIOLOGY | Age: 68
End: 2023-01-06

## 2023-01-09 ENCOUNTER — TELEPHONE (OUTPATIENT)
Dept: CARDIOLOGY | Age: 68
End: 2023-01-09

## 2023-01-25 ENCOUNTER — TELEPHONE (OUTPATIENT)
Dept: PULMONOLOGY | Age: 68
End: 2023-01-25

## 2023-01-27 ENCOUNTER — NURSE TRIAGE (OUTPATIENT)
Dept: INTERNAL MEDICINE | Age: 68
End: 2023-01-27

## 2023-01-27 DIAGNOSIS — A49.8 RECURRENT CLOSTRIDIOIDES DIFFICILE INFECTION: Primary | ICD-10-CM

## 2023-01-30 ENCOUNTER — TELEPHONE (OUTPATIENT)
Dept: INFECTIOUS DISEASES | Age: 68
End: 2023-01-30

## 2023-01-30 ENCOUNTER — TELEPHONE (OUTPATIENT)
Dept: INTERNAL MEDICINE | Age: 68
End: 2023-01-30

## 2023-01-30 ENCOUNTER — V-VISIT (OUTPATIENT)
Dept: INTERNAL MEDICINE | Age: 68
End: 2023-01-30

## 2023-01-30 DIAGNOSIS — A04.72 INTESTINAL INFECTION DUE TO CLOSTRIDIUM DIFFICILE: Primary | ICD-10-CM

## 2023-01-30 DIAGNOSIS — A04.72 CLOSTRIDIUM DIFFICILE COLITIS: ICD-10-CM

## 2023-01-30 PROCEDURE — 99214 OFFICE O/P EST MOD 30 MIN: CPT | Performed by: FAMILY MEDICINE

## 2023-01-30 RX ORDER — VANCOMYCIN HYDROCHLORIDE 125 MG/1
125 CAPSULE ORAL EVERY 6 HOURS
Status: SHIPPED | COMMUNITY
Start: 2023-01-30 | End: 2023-02-03 | Stop reason: DRUGHIGH

## 2023-01-31 ENCOUNTER — TELEPHONE (OUTPATIENT)
Dept: INTERNAL MEDICINE | Age: 68
End: 2023-01-31

## 2023-02-01 ENCOUNTER — TELEPHONE (OUTPATIENT)
Dept: INTERNAL MEDICINE | Age: 68
End: 2023-02-01

## 2023-02-03 RX ORDER — VANCOMYCIN HYDROCHLORIDE 125 MG/1
CAPSULE ORAL
Qty: 84 CAPSULE | Refills: 0 | Status: SHIPPED | OUTPATIENT
Start: 2023-02-10 | End: 2023-02-14 | Stop reason: SDUPTHER

## 2023-02-13 ENCOUNTER — TELEPHONE (OUTPATIENT)
Dept: INFECTIOUS DISEASES | Age: 68
End: 2023-02-13

## 2023-02-14 RX ORDER — VANCOMYCIN HYDROCHLORIDE 125 MG/1
CAPSULE ORAL
Qty: 126 CAPSULE | Refills: 0 | Status: SHIPPED | COMMUNITY
Start: 2023-02-14 | End: 2023-03-13 | Stop reason: ALTCHOICE

## 2023-02-21 ENCOUNTER — OFFICE VISIT (OUTPATIENT)
Dept: INFECTIOUS DISEASES | Age: 68
End: 2023-02-21

## 2023-02-21 VITALS
HEIGHT: 66 IN | DIASTOLIC BLOOD PRESSURE: 80 MMHG | HEART RATE: 82 BPM | WEIGHT: 118 LBS | TEMPERATURE: 97.6 F | SYSTOLIC BLOOD PRESSURE: 134 MMHG | BODY MASS INDEX: 18.96 KG/M2

## 2023-02-21 DIAGNOSIS — A04.72 C. DIFFICILE COLITIS: Primary | ICD-10-CM

## 2023-02-21 PROCEDURE — 3079F DIAST BP 80-89 MM HG: CPT | Performed by: INTERNAL MEDICINE

## 2023-02-21 PROCEDURE — 99204 OFFICE O/P NEW MOD 45 MIN: CPT | Performed by: INTERNAL MEDICINE

## 2023-02-21 PROCEDURE — 3075F SYST BP GE 130 - 139MM HG: CPT | Performed by: INTERNAL MEDICINE

## 2023-02-24 ENCOUNTER — TELEPHONE (OUTPATIENT)
Dept: INFECTIOUS DISEASES | Age: 68
End: 2023-02-24

## 2023-02-27 ENCOUNTER — TELEPHONE (OUTPATIENT)
Dept: NEPHROLOGY | Age: 68
End: 2023-02-27

## 2023-02-28 ENCOUNTER — TELEPHONE (OUTPATIENT)
Dept: INFECTIOUS DISEASES | Age: 68
End: 2023-02-28

## 2023-03-02 ENCOUNTER — NURSE TRIAGE (OUTPATIENT)
Dept: INTERNAL MEDICINE | Age: 68
End: 2023-03-02

## 2023-03-09 ENCOUNTER — TELEPHONE (OUTPATIENT)
Dept: INTERNAL MEDICINE | Age: 68
End: 2023-03-09

## 2023-03-09 DIAGNOSIS — A04.72 INTESTINAL INFECTION DUE TO CLOSTRIDIUM DIFFICILE: Primary | ICD-10-CM

## 2023-03-09 DIAGNOSIS — K52.9 CHRONIC DIARRHEA: ICD-10-CM

## 2023-03-10 ENCOUNTER — APPOINTMENT (OUTPATIENT)
Dept: INFECTIOUS DISEASES | Age: 68
End: 2023-03-10
Attending: FAMILY MEDICINE

## 2023-03-10 ENCOUNTER — LAB SERVICES (OUTPATIENT)
Dept: LAB | Age: 68
End: 2023-03-10

## 2023-03-10 DIAGNOSIS — A04.72 INTESTINAL INFECTION DUE TO CLOSTRIDIUM DIFFICILE: ICD-10-CM

## 2023-03-10 PROCEDURE — 87046 STOOL CULTR AEROBIC BACT EA: CPT | Performed by: INTERNAL MEDICINE

## 2023-03-10 PROCEDURE — 87427 SHIGA-LIKE TOXIN AG IA: CPT | Performed by: INTERNAL MEDICINE

## 2023-03-10 PROCEDURE — 87449 NOS EACH ORGANISM AG IA: CPT | Performed by: INTERNAL MEDICINE

## 2023-03-10 PROCEDURE — 87045 FECES CULTURE AEROBIC BACT: CPT | Performed by: INTERNAL MEDICINE

## 2023-03-11 LAB — C JEJUNI+C COLI AG STL QL: NORMAL

## 2023-03-12 LAB
BACTERIA STL CULT: NORMAL
E COLI SHIGA-LIKE TOXIN 1+2 STL QL IA: NORMAL

## 2023-03-13 ENCOUNTER — LAB SERVICES (OUTPATIENT)
Dept: LAB | Age: 68
End: 2023-03-13

## 2023-03-13 ENCOUNTER — TELEPHONE (OUTPATIENT)
Dept: GASTROENTEROLOGY | Age: 68
End: 2023-03-13

## 2023-03-13 DIAGNOSIS — A04.72 INTESTINAL INFECTION DUE TO CLOSTRIDIUM DIFFICILE: ICD-10-CM

## 2023-03-13 LAB
C DIFF TOX A+B STL QL IA: DETECTED
C DIFF TOX B TCDB STL QL NAA+PROBE: DETECTED

## 2023-03-13 PROCEDURE — 87493 C DIFF AMPLIFIED PROBE: CPT | Performed by: INTERNAL MEDICINE

## 2023-03-13 PROCEDURE — 87324 CLOSTRIDIUM AG IA: CPT | Performed by: INTERNAL MEDICINE

## 2023-03-13 RX ORDER — VANCOMYCIN HYDROCHLORIDE 125 MG/1
CAPSULE ORAL
Qty: 85 CAPSULE | Refills: 0 | Status: SHIPPED
Start: 2023-03-13 | End: 2023-05-01

## 2023-03-14 ENCOUNTER — TELEPHONE (OUTPATIENT)
Dept: CT IMAGING | Age: 68
End: 2023-03-14

## 2023-03-14 ENCOUNTER — APPOINTMENT (OUTPATIENT)
Dept: INTERNAL MEDICINE | Age: 68
End: 2023-03-14

## 2023-03-14 ENCOUNTER — TELEPHONE (OUTPATIENT)
Dept: INTERNAL MEDICINE | Age: 68
End: 2023-03-14

## 2023-03-14 DIAGNOSIS — A04.72 C. DIFFICILE DIARRHEA: Primary | ICD-10-CM

## 2023-03-15 ENCOUNTER — LAB SERVICES (OUTPATIENT)
Dept: LAB | Age: 68
End: 2023-03-15

## 2023-03-15 DIAGNOSIS — K52.9 CHRONIC DIARRHEA: ICD-10-CM

## 2023-03-15 LAB
ALBUMIN SERPL-MCNC: 3.8 G/DL (ref 3.6–5.1)
ALBUMIN/GLOB SERPL: 1.5 {RATIO} (ref 1–2.4)
ALP SERPL-CCNC: 89 UNITS/L (ref 45–117)
ALT SERPL-CCNC: 30 UNITS/L
ANION GAP SERPL CALC-SCNC: 16 MMOL/L (ref 7–19)
APPEARANCE UR: ABNORMAL
AST SERPL-CCNC: 28 UNITS/L
BACTERIA #/AREA URNS HPF: ABNORMAL /HPF
BASOPHILS # BLD: 0.1 K/MCL (ref 0–0.3)
BASOPHILS NFR BLD: 1 %
BILIRUB SERPL-MCNC: 0.3 MG/DL (ref 0.2–1)
BILIRUB UR QL STRIP: NEGATIVE
BUN SERPL-MCNC: 12 MG/DL (ref 6–20)
BUN/CREAT SERPL: 12 (ref 7–25)
CALCIUM SERPL-MCNC: 8.6 MG/DL (ref 8.4–10.2)
CAOX CRY URNS QL MICRO: PRESENT
CHLORIDE SERPL-SCNC: 107 MMOL/L (ref 97–110)
CO2 SERPL-SCNC: 22 MMOL/L (ref 21–32)
COLOR UR: YELLOW
CREAT SERPL-MCNC: 1.03 MG/DL (ref 0.51–0.95)
DEPRECATED RDW RBC: 55.7 FL (ref 39–50)
EOSINOPHIL # BLD: 0.1 K/MCL (ref 0–0.5)
EOSINOPHIL NFR BLD: 1 %
ERYTHROCYTE [DISTWIDTH] IN BLOOD: 14.6 % (ref 11–15)
FASTING DURATION TIME PATIENT: ABNORMAL H
GFR SERPLBLD BASED ON 1.73 SQ M-ARVRAT: 59 ML/MIN
GLOBULIN SER-MCNC: 2.5 G/DL (ref 2–4)
GLUCOSE SERPL-MCNC: 91 MG/DL (ref 70–99)
GLUCOSE UR STRIP-MCNC: NEGATIVE MG/DL
HCT VFR BLD CALC: 39.6 % (ref 36–46.5)
HGB BLD-MCNC: 12.6 G/DL (ref 12–15.5)
HGB UR QL STRIP: ABNORMAL
HYALINE CASTS #/AREA URNS LPF: ABNORMAL /LPF
IMM GRANULOCYTES # BLD AUTO: 0 K/MCL (ref 0–0.2)
IMM GRANULOCYTES # BLD: 0 %
KETONES UR STRIP-MCNC: NEGATIVE MG/DL
LEUKOCYTE ESTERASE UR QL STRIP: NEGATIVE
LYMPHOCYTES # BLD: 2 K/MCL (ref 1–4)
LYMPHOCYTES NFR BLD: 24 %
MAGNESIUM SERPL-MCNC: 1.7 MG/DL (ref 1.7–2.4)
MCH RBC QN AUTO: 32.8 PG (ref 26–34)
MCHC RBC AUTO-ENTMCNC: 31.8 G/DL (ref 32–36.5)
MCV RBC AUTO: 103.1 FL (ref 78–100)
MONOCYTES # BLD: 0.7 K/MCL (ref 0.3–0.9)
MONOCYTES NFR BLD: 8 %
MUCOUS THREADS URNS QL MICRO: PRESENT
NEUTROPHILS # BLD: 5.6 K/MCL (ref 1.8–7.7)
NEUTROPHILS NFR BLD: 66 %
NITRITE UR QL STRIP: NEGATIVE
NRBC BLD MANUAL-RTO: 0 /100 WBC
PH UR STRIP: 5 [PH] (ref 5–7)
PLATELET # BLD AUTO: 406 K/MCL (ref 140–450)
POTASSIUM SERPL-SCNC: 3.6 MMOL/L (ref 3.4–5.1)
PROT SERPL-MCNC: 6.3 G/DL (ref 6.4–8.2)
PROT UR STRIP-MCNC: 30 MG/DL
RBC # BLD: 3.84 MIL/MCL (ref 4–5.2)
RBC #/AREA URNS HPF: ABNORMAL /HPF
SODIUM SERPL-SCNC: 141 MMOL/L (ref 135–145)
SP GR UR STRIP: >1.03 (ref 1–1.03)
SQUAMOUS #/AREA URNS HPF: ABNORMAL /HPF
UROBILINOGEN UR STRIP-MCNC: 0.2 MG/DL
WBC # BLD: 8.5 K/MCL (ref 4.2–11)
WBC #/AREA URNS HPF: ABNORMAL /HPF

## 2023-03-15 PROCEDURE — 83735 ASSAY OF MAGNESIUM: CPT | Performed by: INTERNAL MEDICINE

## 2023-03-15 PROCEDURE — 85025 COMPLETE CBC W/AUTO DIFF WBC: CPT | Performed by: INTERNAL MEDICINE

## 2023-03-15 PROCEDURE — 80053 COMPREHEN METABOLIC PANEL: CPT | Performed by: INTERNAL MEDICINE

## 2023-03-15 PROCEDURE — 36415 COLL VENOUS BLD VENIPUNCTURE: CPT | Performed by: FAMILY MEDICINE

## 2023-03-16 ENCOUNTER — OFFICE VISIT (OUTPATIENT)
Dept: INTERNAL MEDICINE | Age: 68
End: 2023-03-16

## 2023-03-16 ENCOUNTER — TELEPHONE (OUTPATIENT)
Dept: INTERNAL MEDICINE | Age: 68
End: 2023-03-16

## 2023-03-16 VITALS
RESPIRATION RATE: 18 BRPM | OXYGEN SATURATION: 99 % | DIASTOLIC BLOOD PRESSURE: 80 MMHG | HEIGHT: 66 IN | BODY MASS INDEX: 18.8 KG/M2 | SYSTOLIC BLOOD PRESSURE: 118 MMHG | HEART RATE: 78 BPM | WEIGHT: 117 LBS

## 2023-03-16 DIAGNOSIS — A04.72 C. DIFFICILE DIARRHEA: Primary | ICD-10-CM

## 2023-03-16 DIAGNOSIS — A04.72 INTESTINAL INFECTION DUE TO CLOSTRIDIUM DIFFICILE: Primary | ICD-10-CM

## 2023-03-16 DIAGNOSIS — A04.72 C. DIFFICILE DIARRHEA: ICD-10-CM

## 2023-03-16 DIAGNOSIS — E77.8 HYPOPROTEINEMIA (CMD): ICD-10-CM

## 2023-03-16 PROCEDURE — 99214 OFFICE O/P EST MOD 30 MIN: CPT | Performed by: FAMILY MEDICINE

## 2023-03-16 PROCEDURE — 3074F SYST BP LT 130 MM HG: CPT | Performed by: FAMILY MEDICINE

## 2023-03-16 PROCEDURE — 3079F DIAST BP 80-89 MM HG: CPT | Performed by: FAMILY MEDICINE

## 2023-03-16 RX ORDER — TOPIRAMATE 100 MG/1
100 TABLET, FILM COATED ORAL
COMMUNITY
Start: 2022-09-30 | End: 2023-06-13 | Stop reason: SDUPTHER

## 2023-03-16 RX ORDER — VANCOMYCIN HYDROCHLORIDE 125 MG/1
1 CAPSULE ORAL 4 TIMES DAILY
COMMUNITY
Start: 2023-02-07 | End: 2023-06-23 | Stop reason: ALTCHOICE

## 2023-03-16 RX ORDER — ACETAMINOPHEN 325 MG/1
650 TABLET ORAL
COMMUNITY
Start: 2023-01-18 | End: 2023-06-23 | Stop reason: ALTCHOICE

## 2023-03-16 ASSESSMENT — PAIN SCALES - GENERAL: PAINLEVEL: 0

## 2023-03-17 ENCOUNTER — TELEPHONE (OUTPATIENT)
Dept: INTERNAL MEDICINE | Age: 68
End: 2023-03-17

## 2023-03-22 ENCOUNTER — TELEPHONE (OUTPATIENT)
Dept: INFECTIOUS DISEASES | Age: 68
End: 2023-03-22

## 2023-03-28 ENCOUNTER — TELEPHONE (OUTPATIENT)
Dept: INTERNAL MEDICINE | Age: 68
End: 2023-03-28

## 2023-04-06 ENCOUNTER — TELEPHONE (OUTPATIENT)
Dept: CARDIOLOGY | Age: 68
End: 2023-04-06

## 2023-04-14 LAB
*MEAN CORPUSCULAR HGB CONC: 32.4 G/DL (ref 32.5–35.8)
A/G RATIO: 1.5 (ref 1.1–2.4)
ALANINE AMINOTRANSFE: 10 U/L (ref 7–52)
ALBUMIN, SERUM (ALB): 4.2 G/DL (ref 3.5–5.7)
ALKALINE PHOSPHATASE (ALK): 68 U/L (ref 34–104)
ANION GAP: 11 MEQ/L (ref 6.2–14.7)
APPEARANCE: CLEAR
ASPARTATE AMINOTRANS: 15 U/L (ref 13–39)
BASOPHIL AUTOMATED: 0.3 %
BASOPHILS: 0 (ref 0–0.14)
BILIRUBIN, TOTAL: 0.4 MG/DL (ref 0.2–0.8)
BILIRUBIN: ABNORMAL
BLOOD UREA NITROGEN (BUN): 19 MG/DL (ref 7–25)
BUN/CREATININE RATIO: 16.2 (ref 7.4–23)
CALCIUM, SERUM: 9.7 MG/DL (ref 8.6–10.3)
CARBON DIOXIDE: 19 MEQ/L (ref 21–31)
CHLORIDE, SERUM: 109 MMOL/L (ref 98–107)
CHRONIC KIDNEY DISEASE STAGE: ABNORMAL
COLOR: YELLOW
CREATININE: 1.17 MG/DL (ref 0.6–1.2)
EOSINOPHIL AUTOMATED: 0.1 %
EOSINOPHILS: 0 (ref 0–0.6)
EST GLOMERULAR FILTRATION RATE: 51 ML/MIN
GLUCOSE, URINE, AUTOMATED: ABNORMAL MG/DL
GLUCOSE: 99 MG/DL (ref 70–99)
HEMATOCRIT: 37.4 % (ref 34.7–45.1)
HEMOGLOBIN: 12.1 GM/DL (ref 12–15.3)
K (POTASSIUM, SERUM): 4.3 MMOL/L (ref 3.5–5.2)
KETONES, URINE, AUTOMATED: ABNORMAL MG/DL
LEUKOCYTE, URINE, AUTOMATED: ABNORMAL
LIPASE: 89 U/L (ref 11–82)
LYMPHOCYTE AUTOMATED: 4.8 %
LYMPHOCYTES: 0.8 (ref 0.78–3.73)
MEAN CORPUSCULAR HGB: 32.8 PG (ref 26–34)
MEAN CORPUSCULAR VOL: 101 FL (ref 80–100)
MEAN PLATELET VOLUME: 7.4 FL (ref 6.8–10.2)
MONOCYTE AUTOMATED: 9.4 %
MONOCYTES: 1.5 (ref 0.17–1)
NA (SODIUM, SERUM): 139 MMOL/L (ref 133–144)
NEUTROPHIL ABSOLUTE: 13.9 (ref 1.91–7.6)
NEUTROPHIL AUTOMATED: 85.4 %
NITRITE, URINE AUTOMATED: NEGATIVE
PH, URINE: 5 (ref 5–8)
PLATELET COUNT: 385 K/MM3 (ref 150–450)
PROTEIN TOTAL: 7 G/DL (ref 6.4–8.9)
PROTEIN, URINE: ABNORMAL MG/DL
RED BLOOD CELL COUNT: 3.7 M/MM3 (ref 3.63–5.04)
RED CELL DISTRIBUTIO: 14.6 % (ref 11.9–15.9)
SPECIFIC GRAVITY UA: 1.03 (ref 1–1.03)
URINE, BLOOD, AUTOMATED: ABNORMAL
UROBILINOGEN, URINE, AUTOMATED: ABNORMAL MG/DL
WHITE BLOOD CELL COU: 16.3 K/MM3 (ref 4–11)

## 2023-04-15 ENCOUNTER — EXTERNAL RECORD (OUTPATIENT)
Dept: OTHER | Age: 68
End: 2023-04-15

## 2023-04-15 LAB
*MEAN CORPUSCULAR HGB CONC: 32.3 G/DL (ref 32.5–35.8)
A/G RATIO: 1.29 (ref 1.1–2.4)
ALANINE AMINOTRANSFE: 11 U/L (ref 7–52)
ALBUMIN, SERUM (ALB): 3.6 G/DL (ref 3.5–5.7)
ALKALINE PHOSPHATASE (ALK): 62 U/L (ref 34–104)
ANION GAP: 8 MEQ/L (ref 6.2–14.7)
ASPARTATE AMINOTRANS: 19 U/L (ref 13–39)
BASOPHIL AUTOMATED: 0.2 %
BASOPHILS: 0 (ref 0–0.14)
BILIRUBIN, TOTAL: 0.5 MG/DL (ref 0.2–0.8)
BLOOD UREA NITROGEN (BUN): 14 MG/DL (ref 7–25)
BUN/CREATININE RATIO: 13.2 (ref 7.4–23)
CALCIUM, SERUM: 8.7 MG/DL (ref 8.6–10.3)
CARBON DIOXIDE: 20 MEQ/L (ref 21–31)
CHLORIDE, SERUM: 110 MMOL/L (ref 98–107)
CHRONIC KIDNEY DISEASE STAGE: ABNORMAL
COVID-19 BY PCR (3 PLEX): NOT DETECTED
CREATININE: 1.06 MG/DL (ref 0.6–1.2)
EOSINOPHIL AUTOMATED: 0 %
EOSINOPHILS: 0 (ref 0–0.6)
EST GLOMERULAR FILTRATION RATE: 57 ML/MIN
GLUCOSE: 116 MG/DL (ref 70–99)
HEMATOCRIT: 35.8 % (ref 34.7–45.1)
HEMOGLOBIN: 11.6 GM/DL (ref 12–15.3)
INFLUENZA A BY PCR (3 PLEX): NOT DETECTED
INFLUENZA B BY PCR (3 PLEX): NOT DETECTED
K (POTASSIUM, SERUM): 3.9 MMOL/L (ref 3.5–5.2)
LYMPHOCYTE AUTOMATED: 2.5 %
LYMPHOCYTES: 0.4 (ref 0.78–3.73)
MEAN CORPUSCULAR HGB: 32.6 PG (ref 26–34)
MEAN CORPUSCULAR VOL: 100.9 FL (ref 80–100)
MEAN PLATELET VOLUME: 7.5 FL (ref 6.8–10.2)
MONOCYTE AUTOMATED: 3.2 %
MONOCYTES: 0.5 (ref 0.17–1)
NA (SODIUM, SERUM): 138 MMOL/L (ref 133–144)
NEUTROPHIL ABSOLUTE: 13.4 (ref 1.91–7.6)
NEUTROPHIL AUTOMATED: 94.1 %
PLATELET COUNT: 341 K/MM3 (ref 150–450)
PROTEIN TOTAL: 6.4 G/DL (ref 6.4–8.9)
RED BLOOD CELL COUNT: 3.55 M/MM3 (ref 3.63–5.04)
RED CELL DISTRIBUTIO: 15.1 % (ref 11.9–15.9)
WHITE BLOOD CELL COU: 14.2 K/MM3 (ref 4–11)

## 2023-04-15 PROCEDURE — 99223 1ST HOSP IP/OBS HIGH 75: CPT | Performed by: HOSPITALIST

## 2023-04-16 PROBLEM — R19.8 PERFORATED ABDOMINAL VISCUS: Status: ACTIVE | Noted: 2023-04-15

## 2023-04-16 LAB
*MEAN CORPUSCULAR HGB CONC: 32.9 G/DL (ref 32.5–35.8)
ALBUMIN, SERUM (ALB): 3.1 G/DL (ref 3.5–5.7)
ANION GAP: 6 MEQ/L (ref 6.2–14.7)
BASOPHIL AUTOMATED: 0.2 %
BASOPHILS: 0 (ref 0–0.14)
BLOOD UREA NITROGEN (BUN): 11 MG/DL (ref 7–25)
BUN/CREATININE RATIO: 12.4 (ref 7.4–23)
CALCIUM ALBUMIN ADJUSTED: 9.1 MG/DL (ref 8.6–10.3)
CALCIUM, SERUM: 8.4 MG/DL (ref 8.6–10.3)
CARBON DIOXIDE: 19 MEQ/L (ref 21–31)
CHLORIDE, SERUM: 110 MMOL/L (ref 98–107)
CREATININE: 0.89 MG/DL (ref 0.6–1.2)
EOSINOPHIL AUTOMATED: 0.6 %
EOSINOPHILS: 0.1 (ref 0–0.6)
EST GLOMERULAR FILTRATION RATE: > 60 ML/MIN
GLUCOSE: 108 MG/DL (ref 70–99)
HEMATOCRIT: 32.5 % (ref 34.7–45.1)
HEMOGLOBIN: 10.7 GM/DL (ref 12–15.3)
K (POTASSIUM, SERUM): 3.7 MMOL/L (ref 3.5–5.2)
LYMPHOCYTE AUTOMATED: 9.3 %
LYMPHOCYTES: 1.2 (ref 0.78–3.73)
MEAN CORPUSCULAR HGB: 32.9 PG (ref 26–34)
MEAN CORPUSCULAR VOL: 100.1 FL (ref 80–100)
MEAN PLATELET VOLUME: 8.3 FL (ref 6.8–10.2)
MONOCYTE AUTOMATED: 8 %
MONOCYTES: 1 (ref 0.17–1)
NA (SODIUM, SERUM): 135 MMOL/L (ref 133–144)
NEUTROPHIL ABSOLUTE: 10.3 (ref 1.91–7.6)
NEUTROPHIL AUTOMATED: 81.9 %
PLATELET COUNT: 359 K/MM3 (ref 150–450)
RED BLOOD CELL COUNT: 3.24 M/MM3 (ref 3.63–5.04)
RED CELL DISTRIBUTIO: 14.6 % (ref 11.9–15.9)
WHITE BLOOD CELL COU: 12.6 K/MM3 (ref 4–11)

## 2023-04-16 PROCEDURE — 99233 SBSQ HOSP IP/OBS HIGH 50: CPT | Performed by: HOSPITALIST

## 2023-04-17 LAB
*MEAN CORPUSCULAR HGB CONC: 33.1 G/DL (ref 32.5–35.8)
ANION GAP: 6 MEQ/L (ref 6.2–14.7)
BASOPHIL AUTOMATED: 0.2 %
BASOPHILS: 0 (ref 0–0.14)
BLOOD UREA NITROGEN (BUN): 7 MG/DL (ref 7–25)
BUN/CREATININE RATIO: 8.2 (ref 7.4–23)
CALCIUM, SERUM: 8.8 MG/DL (ref 8.6–10.3)
CARBON DIOXIDE: 24 MEQ/L (ref 21–31)
CHLORIDE, SERUM: 109 MMOL/L (ref 98–107)
CREATININE: 0.85 MG/DL (ref 0.6–1.2)
EOSINOPHIL AUTOMATED: 3.6 %
EOSINOPHILS: 0.4 (ref 0–0.6)
EST GLOMERULAR FILTRATION RATE: > 60 ML/MIN
GLUCOSE: 102 MG/DL (ref 70–99)
HEMATOCRIT: 34.9 % (ref 34.7–45.1)
HEMOGLOBIN: 11.6 GM/DL (ref 12–15.3)
K (POTASSIUM, SERUM): 3.8 MMOL/L (ref 3.5–5.2)
LYMPHOCYTE AUTOMATED: 8.1 %
LYMPHOCYTES: 0.8 (ref 0.78–3.73)
MEAN CORPUSCULAR HGB: 33 PG (ref 26–34)
MEAN CORPUSCULAR VOL: 99.6 FL (ref 80–100)
MEAN PLATELET VOLUME: 8 FL (ref 6.8–10.2)
MONOCYTE AUTOMATED: 7.6 %
MONOCYTES: 0.8 (ref 0.17–1)
NA (SODIUM, SERUM): 139 MMOL/L (ref 133–144)
NEUTROPHIL ABSOLUTE: 8.3 (ref 1.91–7.6)
NEUTROPHIL AUTOMATED: 80.5 %
PLATELET COUNT: 358 K/MM3 (ref 150–450)
RED BLOOD CELL COUNT: 3.5 M/MM3 (ref 3.63–5.04)
RED CELL DISTRIBUTIO: 14.3 % (ref 11.9–15.9)
WHITE BLOOD CELL COU: 10.3 K/MM3 (ref 4–11)

## 2023-04-17 PROCEDURE — 93010 ELECTROCARDIOGRAM REPORT: CPT | Performed by: INTERNAL MEDICINE

## 2023-04-17 PROCEDURE — 99233 SBSQ HOSP IP/OBS HIGH 50: CPT | Performed by: FAMILY MEDICINE

## 2023-04-18 LAB
*MEAN CORPUSCULAR HGB CONC: 32.6 G/DL (ref 32.5–35.8)
ANION GAP: 8 MEQ/L (ref 6.2–14.7)
BASOPHIL AUTOMATED: 0.8 %
BASOPHILS: 0.1 (ref 0–0.14)
BLOOD UREA NITROGEN (BUN): 8 MG/DL (ref 7–25)
BUN/CREATININE RATIO: 8.6 (ref 7.4–23)
CALCIUM, SERUM: 8.8 MG/DL (ref 8.6–10.3)
CARBON DIOXIDE: 24 MEQ/L (ref 21–31)
CHLORIDE, SERUM: 106 MMOL/L (ref 98–107)
CREATININE: 0.93 MG/DL (ref 0.6–1.2)
EOSINOPHIL AUTOMATED: 4.9 %
EOSINOPHILS: 0.3 (ref 0–0.6)
EST GLOMERULAR FILTRATION RATE: > 60 ML/MIN
GLUCOSE: 83 MG/DL (ref 70–99)
HEMATOCRIT: 34.4 % (ref 34.7–45.1)
HEMOGLOBIN: 11.2 GM/DL (ref 12–15.3)
K (POTASSIUM, SERUM): 3.5 MMOL/L (ref 3.5–5.2)
LYMPHOCYTE AUTOMATED: 10.2 %
LYMPHOCYTES: 0.7 (ref 0.78–3.73)
MEAN CORPUSCULAR HGB: 32.6 PG (ref 26–34)
MEAN CORPUSCULAR VOL: 100.1 FL (ref 80–100)
MEAN PLATELET VOLUME: 8.1 FL (ref 6.8–10.2)
MG (MAGNESIUM, SERUM: 1.8 MG/DL (ref 1.6–2.6)
MONOCYTE AUTOMATED: 11 %
MONOCYTES: 0.8 (ref 0.17–1)
NA (SODIUM, SERUM): 138 MMOL/L (ref 133–144)
NEUTROPHIL ABSOLUTE: 5.1 (ref 1.91–7.6)
NEUTROPHIL AUTOMATED: 73.1 %
PLATELET COUNT: 313 K/MM3 (ref 150–450)
RED BLOOD CELL COUNT: 3.44 M/MM3 (ref 3.63–5.04)
RED CELL DISTRIBUTIO: 14.1 % (ref 11.9–15.9)
WHITE BLOOD CELL COU: 7 K/MM3 (ref 4–11)

## 2023-04-18 PROCEDURE — 99233 SBSQ HOSP IP/OBS HIGH 50: CPT | Performed by: HOSPITALIST

## 2023-04-19 LAB
ALBUMIN, SERUM (ALB): 2.9 G/DL (ref 3.5–5.7)
ANION GAP: 9 MEQ/L (ref 6.2–14.7)
BEDSIDE GLUCOSE: 103 MG/DL (ref 70–110)
BEDSIDE GLUCOSE: 84 MG/DL (ref 70–110)
BLOOD UREA NITROGEN (BUN): 10 MG/DL (ref 7–25)
BUN/CREATININE RATIO: 9.6 (ref 7.4–23)
CALCIUM ALBUMIN ADJUSTED: 9.2 MG/DL (ref 8.6–10.3)
CALCIUM, SERUM: 8.3 MG/DL (ref 8.6–10.3)
CARBON DIOXIDE: 22 MEQ/L (ref 21–31)
CHLORIDE, SERUM: 106 MMOL/L (ref 98–107)
CHRONIC KIDNEY DISEASE STAGE: ABNORMAL
CREATININE: 1.04 MG/DL (ref 0.6–1.2)
EST GLOMERULAR FILTRATION RATE: 58 ML/MIN
GLUCOSE: 78 MG/DL (ref 70–99)
K (POTASSIUM, SERUM): 3.2 MMOL/L (ref 3.5–5.2)
NA (SODIUM, SERUM): 137 MMOL/L (ref 133–144)

## 2023-04-19 PROCEDURE — 99233 SBSQ HOSP IP/OBS HIGH 50: CPT | Performed by: HOSPITALIST

## 2023-04-20 PROCEDURE — 99233 SBSQ HOSP IP/OBS HIGH 50: CPT | Performed by: HOSPITALIST

## 2023-04-21 LAB
*MEAN CORPUSCULAR HGB CONC: 32.6 G/DL (ref 32.5–35.8)
ALBUMIN, SERUM (ALB): 2.9 G/DL (ref 3.5–5.7)
AMPICILLIN: 0.12
AMPICILLIN: 0.12
ANION GAP: 8 MEQ/L (ref 6.2–14.7)
AZITHROMYCIN: 2
AZITHROMYCIN: >2
BLOOD UREA NITROGEN (BUN): 5 MG/DL (ref 7–25)
BODY FLUID CULTURE: NORMAL
BUN/CREATININE RATIO: 6 (ref 7.4–23)
CALCIUM ALBUMIN ADJUSTED: 8.8 MG/DL (ref 8.6–10.3)
CALCIUM, SERUM: 7.9 MG/DL (ref 8.6–10.3)
CARBON DIOXIDE: 22 MEQ/L (ref 21–31)
CEFTRIAXONE: <=0.25
CEFTRIAXONE: <=0.25
CHLORIDE, SERUM: 107 MMOL/L (ref 98–107)
CLINDAMYCIN: 0.12
CLINDAMYCIN: >0.5
CREATININE: 0.83 MG/DL (ref 0.6–1.2)
EST GLOMERULAR FILTRATION RATE: > 60 ML/MIN
FLUCONAZOLE (FLUCON): <=0.5
GLUCOSE: 85 MG/DL (ref 70–99)
GRAM STAIN: NORMAL
HEMATOCRIT: 33.1 % (ref 34.7–45.1)
HEMOGLOBIN: 10.8 GM/DL (ref 12–15.3)
K (POTASSIUM, SERUM): 3.3 MMOL/L (ref 3.5–5.2)
LEVOFLOXACIN: 0.5
LEVOFLOXACIN: 0.5
Lab: <=0.12
MEAN CORPUSCULAR HGB: 32.4 PG (ref 26–34)
MEAN CORPUSCULAR VOL: 99.3 FL (ref 80–100)
MEAN PLATELET VOLUME: 7.5 FL (ref 6.8–10.2)
MICAFUNGIN: <=0.06
NA (SODIUM, SERUM): 137 MMOL/L (ref 133–144)
PENICILLIN: 0.06
PENICILLIN: 0.06
PLATELET COUNT: 317 K/MM3 (ref 150–450)
RED BLOOD CELL COUNT: 3.33 M/MM3 (ref 3.63–5.04)
RED CELL DISTRIBUTIO: 14.8 % (ref 11.9–15.9)
VANCOMYCIN SERPL-MCNC: 0.5 UG/ML
VANCOMYCIN SERPL-MCNC: 0.5 UG/ML
WHITE BLOOD CELL COU: 6.7 K/MM3 (ref 4–11)

## 2023-04-21 PROCEDURE — 99233 SBSQ HOSP IP/OBS HIGH 50: CPT | Performed by: HOSPITALIST

## 2023-04-22 LAB
ALBUMIN, SERUM (ALB): 2.7 G/DL (ref 3.5–5.7)
ANION GAP: 5 MEQ/L (ref 6.2–14.7)
BLOOD UREA NITROGEN (BUN): 4 MG/DL (ref 7–25)
BUN/CREATININE RATIO: 5.2 (ref 7.4–23)
CALCIUM ALBUMIN ADJUSTED: 8.9 MG/DL (ref 8.6–10.3)
CALCIUM, SERUM: 7.9 MG/DL (ref 8.6–10.3)
CARBON DIOXIDE: 24 MEQ/L (ref 21–31)
CHLORIDE, SERUM: 107 MMOL/L (ref 98–107)
CREATININE: 0.77 MG/DL (ref 0.6–1.2)
EST GLOMERULAR FILTRATION RATE: > 60 ML/MIN
GLUCOSE: 83 MG/DL (ref 70–99)
K (POTASSIUM, SERUM): 3.4 MMOL/L (ref 3.5–5.2)
NA (SODIUM, SERUM): 136 MMOL/L (ref 133–144)

## 2023-04-22 PROCEDURE — 99233 SBSQ HOSP IP/OBS HIGH 50: CPT | Performed by: HOSPITALIST

## 2023-04-23 LAB
ALBUMIN, SERUM (ALB): 2.9 G/DL (ref 3.5–5.7)
ANION GAP: 6 MEQ/L (ref 6.2–14.7)
BLOOD UREA NITROGEN (BUN): 5 MG/DL (ref 7–25)
BUN/CREATININE RATIO: 6.3 (ref 7.4–23)
CALCIUM ALBUMIN ADJUSTED: 9.1 MG/DL (ref 8.6–10.3)
CALCIUM, SERUM: 8.2 MG/DL (ref 8.6–10.3)
CARBON DIOXIDE: 22 MEQ/L (ref 21–31)
CHLORIDE, SERUM: 108 MMOL/L (ref 98–107)
CREATININE: 0.8 MG/DL (ref 0.6–1.2)
EST GLOMERULAR FILTRATION RATE: > 60 ML/MIN
GLUCOSE: 88 MG/DL (ref 70–99)
K (POTASSIUM, SERUM): 4.4 MMOL/L (ref 3.5–5.2)
NA (SODIUM, SERUM): 136 MMOL/L (ref 133–144)

## 2023-04-23 PROCEDURE — 99233 SBSQ HOSP IP/OBS HIGH 50: CPT | Performed by: HOSPITALIST

## 2023-04-24 LAB
ANION GAP: 7 MEQ/L (ref 6.2–14.7)
BLOOD UREA NITROGEN (BUN): 5 MG/DL (ref 7–25)
BUN/CREATININE RATIO: 6.3 (ref 7.4–23)
CALCIUM, SERUM: 8.9 MG/DL (ref 8.6–10.3)
CARBON DIOXIDE: 22 MEQ/L (ref 21–31)
CHLORIDE, SERUM: 107 MMOL/L (ref 98–107)
CREATININE: 0.8 MG/DL (ref 0.6–1.2)
EST GLOMERULAR FILTRATION RATE: > 60 ML/MIN
GLUCOSE: 83 MG/DL (ref 70–99)
K (POTASSIUM, SERUM): 4.3 MMOL/L (ref 3.5–5.2)
NA (SODIUM, SERUM): 136 MMOL/L (ref 133–144)

## 2023-04-24 PROCEDURE — 99233 SBSQ HOSP IP/OBS HIGH 50: CPT | Performed by: HOSPITALIST

## 2023-04-25 ENCOUNTER — EXTERNAL RECORD (OUTPATIENT)
Dept: OTHER | Age: 68
End: 2023-04-25

## 2023-04-25 LAB
*MEAN CORPUSCULAR HGB CONC: 32.6 G/DL (ref 32.5–35.8)
ANION GAP: 7 MEQ/L (ref 6.2–14.7)
BASOPHIL AUTOMATED: 0.9 %
BASOPHILS: 0.1 (ref 0–0.14)
BLOOD UREA NITROGEN (BUN): 7 MG/DL (ref 7–25)
BUN/CREATININE RATIO: 7.4 (ref 7.4–23)
CALCIUM, SERUM: 8.7 MG/DL (ref 8.6–10.3)
CARBON DIOXIDE: 22 MEQ/L (ref 21–31)
CHLORIDE, SERUM: 105 MMOL/L (ref 98–107)
CREATININE: 0.94 MG/DL (ref 0.6–1.2)
EOSINOPHIL AUTOMATED: 3.1 %
EOSINOPHILS: 0.4 (ref 0–0.6)
EST GLOMERULAR FILTRATION RATE: > 60 ML/MIN
GLUCOSE: 83 MG/DL (ref 70–99)
HEMATOCRIT: 33.6 % (ref 34.7–45.1)
HEMOGLOBIN: 10.9 GM/DL (ref 12–15.3)
K (POTASSIUM, SERUM): 3.9 MMOL/L (ref 3.5–5.2)
LYMPHOCYTE AUTOMATED: 18.4 %
LYMPHOCYTES: 2.2 (ref 0.78–3.73)
MEAN CORPUSCULAR HGB: 32.4 PG (ref 26–34)
MEAN CORPUSCULAR VOL: 99.5 FL (ref 80–100)
MEAN PLATELET VOLUME: 8 FL (ref 6.8–10.2)
MONOCYTE AUTOMATED: 11 %
MONOCYTES: 1.3 (ref 0.17–1)
NA (SODIUM, SERUM): 134 MMOL/L (ref 133–144)
NEUTROPHIL ABSOLUTE: 7.8 (ref 1.91–7.6)
NEUTROPHIL AUTOMATED: 66.6 %
PLATELET COUNT: 486 K/MM3 (ref 150–450)
RED BLOOD CELL COUNT: 3.38 M/MM3 (ref 3.63–5.04)
RED CELL DISTRIBUTIO: 14.6 % (ref 11.9–15.9)
WHITE BLOOD CELL COU: 11.7 K/MM3 (ref 4–11)

## 2023-04-25 PROCEDURE — 99238 HOSP IP/OBS DSCHRG MGMT 30/<: CPT | Performed by: HOSPITALIST

## 2023-04-26 ENCOUNTER — TELEPHONE (OUTPATIENT)
Dept: INTERNAL MEDICINE | Age: 68
End: 2023-04-26

## 2023-04-28 ENCOUNTER — TELEPHONE (OUTPATIENT)
Dept: CARDIOLOGY | Age: 68
End: 2023-04-28

## 2023-05-04 ENCOUNTER — OFFICE VISIT (OUTPATIENT)
Dept: INTERNAL MEDICINE | Age: 68
End: 2023-05-04

## 2023-05-04 VITALS
HEART RATE: 80 BPM | SYSTOLIC BLOOD PRESSURE: 124 MMHG | WEIGHT: 105.6 LBS | TEMPERATURE: 96 F | HEIGHT: 66 IN | DIASTOLIC BLOOD PRESSURE: 88 MMHG | BODY MASS INDEX: 16.97 KG/M2

## 2023-05-04 DIAGNOSIS — G43.809 OTHER MIGRAINE WITHOUT STATUS MIGRAINOSUS, NOT INTRACTABLE: ICD-10-CM

## 2023-05-04 DIAGNOSIS — I10 PRIMARY HYPERTENSION: ICD-10-CM

## 2023-05-04 DIAGNOSIS — N18.32 STAGE 3B CHRONIC KIDNEY DISEASE (CMD): ICD-10-CM

## 2023-05-04 DIAGNOSIS — A04.72 CLOSTRIDIUM DIFFICILE DIARRHEA: ICD-10-CM

## 2023-05-04 DIAGNOSIS — R10.30 LOWER ABDOMINAL PAIN: ICD-10-CM

## 2023-05-04 DIAGNOSIS — K25.1 ACUTE GASTRIC ULCER WITH PERFORATION (CMD): Primary | ICD-10-CM

## 2023-05-04 DIAGNOSIS — Z72.0 TOBACCO USE: ICD-10-CM

## 2023-05-04 LAB
*MEAN CORPUSCULAR HGB CONC: 32.9 G/DL (ref 32.5–35.8)
A/G RATIO: 1.03 (ref 1.1–2.4)
ALANINE AMINOTRANSFE: 7 U/L (ref 7–52)
ALBUMIN, SERUM (ALB): 3.6 G/DL (ref 3.5–5.7)
ALKALINE PHOSPHATASE (ALK): 85 U/L (ref 34–104)
ANION GAP: 11 MEQ/L (ref 6.2–14.7)
ASPARTATE AMINOTRANS: 19 U/L (ref 13–39)
BASOPHIL AUTOMATED: 0.5 %
BASOPHILS: 0 (ref 0–0.14)
BILIRUBIN, TOTAL: 0.3 MG/DL (ref 0.2–0.8)
BLOOD UREA NITROGEN (BUN): 12 MG/DL (ref 7–25)
BUN/CREATININE RATIO: 10.3 (ref 7.4–23)
CALCIUM, SERUM: 9 MG/DL (ref 8.6–10.3)
CARBON DIOXIDE: 19 MEQ/L (ref 21–31)
CHLORIDE, SERUM: 105 MMOL/L (ref 98–107)
CHRONIC KIDNEY DISEASE STAGE: ABNORMAL
CREATININE: 1.17 MG/DL (ref 0.6–1.2)
EOSINOPHIL AUTOMATED: 1.1 %
EOSINOPHILS: 0.1 (ref 0–0.6)
EST GLOMERULAR FILTRATION RATE: 51 ML/MIN
GLUCOSE: 93 MG/DL (ref 70–99)
HEMATOCRIT: 32.6 % (ref 34.7–45.1)
HEMOGLOBIN: 10.7 GM/DL (ref 12–15.3)
K (POTASSIUM, SERUM): 3.8 MMOL/L (ref 3.5–5.2)
LYMPHOCYTE AUTOMATED: 17.6 %
LYMPHOCYTES: 1.5 (ref 0.78–3.73)
MEAN CORPUSCULAR HGB: 32.7 PG (ref 26–34)
MEAN CORPUSCULAR VOL: 99.2 FL (ref 80–100)
MEAN PLATELET VOLUME: 7.1 FL (ref 6.8–10.2)
MONOCYTE AUTOMATED: 8.6 %
MONOCYTES: 0.7 (ref 0.17–1)
NA (SODIUM, SERUM): 135 MMOL/L (ref 133–144)
NEUTROPHIL ABSOLUTE: 6.1 (ref 1.91–7.6)
NEUTROPHIL AUTOMATED: 72.2 %
PLATELET COUNT: 706 K/MM3 (ref 150–450)
PROTEIN TOTAL: 7.1 G/DL (ref 6.4–8.9)
RED BLOOD CELL COUNT: 3.29 M/MM3 (ref 3.63–5.04)
RED CELL DISTRIBUTIO: 14.6 % (ref 11.9–15.9)
WHITE BLOOD CELL COU: 8.5 K/MM3 (ref 4–11)

## 2023-05-04 PROCEDURE — 3074F SYST BP LT 130 MM HG: CPT | Performed by: FAMILY MEDICINE

## 2023-05-04 PROCEDURE — 99214 OFFICE O/P EST MOD 30 MIN: CPT | Performed by: FAMILY MEDICINE

## 2023-05-04 PROCEDURE — 3079F DIAST BP 80-89 MM HG: CPT | Performed by: FAMILY MEDICINE

## 2023-05-04 PROCEDURE — 99495 TRANSJ CARE MGMT MOD F2F 14D: CPT | Performed by: FAMILY MEDICINE

## 2023-05-04 RX ORDER — DICYCLOMINE HYDROCHLORIDE 10 MG/1
CAPSULE ORAL
COMMUNITY
Start: 2023-04-25 | End: 2023-05-15 | Stop reason: SDUPTHER

## 2023-05-04 RX ORDER — PANTOPRAZOLE SODIUM 40 MG/1
TABLET, DELAYED RELEASE ORAL
COMMUNITY
Start: 2023-04-25 | End: 2023-05-15 | Stop reason: SDUPTHER

## 2023-05-04 RX ORDER — HYDROCODONE BITARTRATE AND ACETAMINOPHEN 5; 325 MG/1; MG/1
1 TABLET ORAL
COMMUNITY
Start: 2023-01-18 | End: 2023-06-13 | Stop reason: ALTCHOICE

## 2023-05-04 RX ORDER — FLUCONAZOLE 100 MG/1
TABLET ORAL
COMMUNITY
Start: 2023-04-25 | End: 2023-06-13 | Stop reason: ALTCHOICE

## 2023-05-04 RX ORDER — METRONIDAZOLE 500 MG/1
TABLET ORAL
COMMUNITY
Start: 2023-04-25 | End: 2023-06-13 | Stop reason: ALTCHOICE

## 2023-05-04 ASSESSMENT — PATIENT HEALTH QUESTIONNAIRE - PHQ9
SUM OF ALL RESPONSES TO PHQ9 QUESTIONS 1 AND 2: 0
1. LITTLE INTEREST OR PLEASURE IN DOING THINGS: NOT AT ALL
2. FEELING DOWN, DEPRESSED OR HOPELESS: NOT AT ALL
CLINICAL INTERPRETATION OF PHQ2 SCORE: NO FURTHER SCREENING NEEDED
SUM OF ALL RESPONSES TO PHQ9 QUESTIONS 1 AND 2: 0

## 2023-05-08 ENCOUNTER — TELEPHONE (OUTPATIENT)
Dept: INTERNAL MEDICINE | Age: 68
End: 2023-05-08

## 2023-05-08 ENCOUNTER — NURSE TRIAGE (OUTPATIENT)
Dept: INTERNAL MEDICINE | Age: 68
End: 2023-05-08

## 2023-05-08 DIAGNOSIS — A04.72 CLOSTRIDIUM DIFFICILE DIARRHEA: Primary | ICD-10-CM

## 2023-05-11 ENCOUNTER — TELEPHONE (OUTPATIENT)
Dept: CARDIOLOGY | Age: 68
End: 2023-05-11

## 2023-05-11 ENCOUNTER — LAB SERVICES (OUTPATIENT)
Dept: LAB | Age: 68
End: 2023-05-11

## 2023-05-12 ENCOUNTER — TELEPHONE (OUTPATIENT)
Dept: GASTROENTEROLOGY | Age: 68
End: 2023-05-12

## 2023-05-12 ENCOUNTER — LAB SERVICES (OUTPATIENT)
Dept: LAB | Age: 68
End: 2023-05-12

## 2023-05-12 DIAGNOSIS — A04.72 CLOSTRIDIUM DIFFICILE DIARRHEA: ICD-10-CM

## 2023-05-12 LAB — C DIFF TOX B TCDB STL QL NAA+PROBE: NOT DETECTED

## 2023-05-12 PROCEDURE — 87493 C DIFF AMPLIFIED PROBE: CPT | Performed by: INTERNAL MEDICINE

## 2023-05-15 RX ORDER — DICYCLOMINE HYDROCHLORIDE 10 MG/1
10 CAPSULE ORAL EVERY 6 HOURS PRN
Qty: 40 CAPSULE | Refills: 0 | Status: SHIPPED | OUTPATIENT
Start: 2023-05-15 | End: 2023-06-13 | Stop reason: ALTCHOICE

## 2023-05-15 RX ORDER — PANTOPRAZOLE SODIUM 40 MG/1
TABLET, DELAYED RELEASE ORAL
Qty: 60 TABLET | Refills: 11 | Status: SHIPPED | OUTPATIENT
Start: 2023-05-15

## 2023-05-22 ENCOUNTER — TELEPHONE (OUTPATIENT)
Dept: GASTROENTEROLOGY | Age: 68
End: 2023-05-22

## 2023-06-08 ENCOUNTER — APPOINTMENT (OUTPATIENT)
Dept: CARDIOLOGY | Age: 68
End: 2023-06-08

## 2023-06-13 ENCOUNTER — OFFICE VISIT (OUTPATIENT)
Dept: INTERNAL MEDICINE | Age: 68
End: 2023-06-13

## 2023-06-13 VITALS
BODY MASS INDEX: 17.04 KG/M2 | WEIGHT: 106 LBS | TEMPERATURE: 95.9 F | HEIGHT: 66 IN | RESPIRATION RATE: 16 BRPM | HEART RATE: 68 BPM | DIASTOLIC BLOOD PRESSURE: 90 MMHG | SYSTOLIC BLOOD PRESSURE: 144 MMHG

## 2023-06-13 DIAGNOSIS — Z95.828 HISTORY OF ENDOVASCULAR STENT GRAFT FOR ABDOMINAL AORTIC ANEURYSM (AAA): ICD-10-CM

## 2023-06-13 DIAGNOSIS — N39.0 RECURRENT UTI: ICD-10-CM

## 2023-06-13 DIAGNOSIS — A49.8 RECURRENT CLOSTRIDIOIDES DIFFICILE INFECTION: ICD-10-CM

## 2023-06-13 DIAGNOSIS — G43.809 OTHER MIGRAINE WITHOUT STATUS MIGRAINOSUS, NOT INTRACTABLE: ICD-10-CM

## 2023-06-13 DIAGNOSIS — Z00.00 PE (PHYSICAL EXAM), ROUTINE: Primary | ICD-10-CM

## 2023-06-13 DIAGNOSIS — I10 PRIMARY HYPERTENSION: ICD-10-CM

## 2023-06-13 DIAGNOSIS — Z72.0 TOBACCO USE: ICD-10-CM

## 2023-06-13 DIAGNOSIS — R19.8 PERFORATED ABDOMINAL VISCUS: ICD-10-CM

## 2023-06-13 DIAGNOSIS — N18.31 STAGE 3A CHRONIC KIDNEY DISEASE (CMD): ICD-10-CM

## 2023-06-13 DIAGNOSIS — E78.2 MIXED HYPERLIPIDEMIA: ICD-10-CM

## 2023-06-13 DIAGNOSIS — Z12.31 ENCOUNTER FOR SCREENING MAMMOGRAM FOR MALIGNANT NEOPLASM OF BREAST: ICD-10-CM

## 2023-06-13 DIAGNOSIS — N20.0 CALCULUS OF KIDNEY: ICD-10-CM

## 2023-06-13 PROCEDURE — 3077F SYST BP >= 140 MM HG: CPT | Performed by: FAMILY MEDICINE

## 2023-06-13 PROCEDURE — G0439 PPPS, SUBSEQ VISIT: HCPCS | Performed by: FAMILY MEDICINE

## 2023-06-13 PROCEDURE — 99214 OFFICE O/P EST MOD 30 MIN: CPT | Performed by: FAMILY MEDICINE

## 2023-06-13 ASSESSMENT — PATIENT HEALTH QUESTIONNAIRE - PHQ9
SUM OF ALL RESPONSES TO PHQ9 QUESTIONS 1 AND 2: 0
2. FEELING DOWN, DEPRESSED OR HOPELESS: NOT AT ALL
SUM OF ALL RESPONSES TO PHQ9 QUESTIONS 1 AND 2: 0
1. LITTLE INTEREST OR PLEASURE IN DOING THINGS: NOT AT ALL
CLINICAL INTERPRETATION OF PHQ2 SCORE: NO FURTHER SCREENING NEEDED

## 2023-06-13 ASSESSMENT — PAIN SCALES - GENERAL: PAINLEVEL: 0

## 2023-06-21 ASSESSMENT — ENCOUNTER SYMPTOMS
NEUROLOGICAL NEGATIVE: 1
GASTROINTESTINAL NEGATIVE: 1
PSYCHIATRIC NEGATIVE: 1
CONSTITUTIONAL NEGATIVE: 1
RESPIRATORY NEGATIVE: 1
ENDOCRINE NEGATIVE: 1
HEMATOLOGIC/LYMPHATIC NEGATIVE: 1
EYES NEGATIVE: 1

## 2023-06-23 ENCOUNTER — OFFICE VISIT (OUTPATIENT)
Dept: CARDIOLOGY | Age: 68
End: 2023-06-23

## 2023-06-23 VITALS
SYSTOLIC BLOOD PRESSURE: 138 MMHG | BODY MASS INDEX: 16.94 KG/M2 | WEIGHT: 105.4 LBS | HEART RATE: 79 BPM | RESPIRATION RATE: 18 BRPM | HEIGHT: 66 IN | DIASTOLIC BLOOD PRESSURE: 80 MMHG | OXYGEN SATURATION: 100 %

## 2023-06-23 DIAGNOSIS — R60.0 BILATERAL LEG EDEMA: ICD-10-CM

## 2023-06-23 DIAGNOSIS — Z86.711 HISTORY OF PULMONARY EMBOLISM: ICD-10-CM

## 2023-06-23 DIAGNOSIS — I10 PRIMARY HYPERTENSION: ICD-10-CM

## 2023-06-23 DIAGNOSIS — E78.2 MIXED HYPERLIPIDEMIA: ICD-10-CM

## 2023-06-23 DIAGNOSIS — Z95.828 HISTORY OF ENDOVASCULAR STENT GRAFT FOR ABDOMINAL AORTIC ANEURYSM (AAA): ICD-10-CM

## 2023-06-23 DIAGNOSIS — I71.21 ANEURYSM OF ASCENDING AORTA WITHOUT RUPTURE (CMD): ICD-10-CM

## 2023-06-23 DIAGNOSIS — G45.9 TIA (TRANSIENT ISCHEMIC ATTACK): ICD-10-CM

## 2023-06-23 DIAGNOSIS — Z98.890 S/P CARDIAC CATH: Primary | ICD-10-CM

## 2023-06-23 DIAGNOSIS — N18.32 STAGE 3B CHRONIC KIDNEY DISEASE (CMD): ICD-10-CM

## 2023-06-23 DIAGNOSIS — I87.2 VENOUS INSUFFICIENCY: ICD-10-CM

## 2023-06-23 PROBLEM — K26.5: Status: ACTIVE | Noted: 2023-06-23

## 2023-06-23 PROCEDURE — 3079F DIAST BP 80-89 MM HG: CPT | Performed by: INTERNAL MEDICINE

## 2023-06-23 PROCEDURE — 3075F SYST BP GE 130 - 139MM HG: CPT | Performed by: INTERNAL MEDICINE

## 2023-06-23 PROCEDURE — 93000 ELECTROCARDIOGRAM COMPLETE: CPT | Performed by: INTERNAL MEDICINE

## 2023-06-23 PROCEDURE — 99214 OFFICE O/P EST MOD 30 MIN: CPT | Performed by: INTERNAL MEDICINE

## 2023-07-12 ENCOUNTER — EXTERNAL RECORD (OUTPATIENT)
Dept: HEALTH INFORMATION MANAGEMENT | Facility: OTHER | Age: 68
End: 2023-07-12

## 2023-08-18 ENCOUNTER — ANCILLARY PROCEDURE (OUTPATIENT)
Dept: CARDIOLOGY | Age: 68
End: 2023-08-18
Attending: INTERNAL MEDICINE

## 2023-08-18 ENCOUNTER — E-ADVICE (OUTPATIENT)
Dept: CARDIOLOGY | Age: 68
End: 2023-08-18

## 2023-08-18 DIAGNOSIS — R60.0 BILATERAL LEG EDEMA: ICD-10-CM

## 2023-08-18 DIAGNOSIS — I87.2 VENOUS INSUFFICIENCY: ICD-10-CM

## 2023-08-18 PROCEDURE — 93970 EXTREMITY STUDY: CPT | Performed by: INTERNAL MEDICINE

## 2023-10-12 ENCOUNTER — TELEPHONE (OUTPATIENT)
Dept: INTERNAL MEDICINE | Age: 68
End: 2023-10-12

## 2023-10-12 RX ORDER — ATORVASTATIN CALCIUM 80 MG/1
80 TABLET, FILM COATED ORAL DAILY
Qty: 90 TABLET | Refills: 0 | Status: SHIPPED | OUTPATIENT
Start: 2023-10-12 | End: 2023-10-13 | Stop reason: SDUPTHER

## 2023-10-13 DIAGNOSIS — I25.119 CORONARY ARTERY DISEASE INVOLVING NATIVE CORONARY ARTERY OF NATIVE HEART WITH ANGINA PECTORIS (CMD): ICD-10-CM

## 2023-10-13 DIAGNOSIS — E78.2 MIXED HYPERLIPIDEMIA: Primary | ICD-10-CM

## 2023-10-13 DIAGNOSIS — I10 ESSENTIAL HYPERTENSION: ICD-10-CM

## 2023-10-13 DIAGNOSIS — I71.40 ABDOMINAL AORTIC ANEURYSM WITHOUT RUPTURE (CMD): ICD-10-CM

## 2023-10-13 RX ORDER — BUTALBITAL, ACETAMINOPHEN AND CAFFEINE 50; 325; 40 MG/1; MG/1; MG/1
1 TABLET ORAL EVERY 6 HOURS PRN
Qty: 30 TABLET | Refills: 0 | Status: SHIPPED | OUTPATIENT
Start: 2023-10-13

## 2023-10-13 RX ORDER — ATORVASTATIN CALCIUM 80 MG/1
80 TABLET, FILM COATED ORAL DAILY
Qty: 90 TABLET | Refills: 0 | Status: SHIPPED | OUTPATIENT
Start: 2023-10-13

## 2023-10-13 RX ORDER — ATORVASTATIN CALCIUM 80 MG/1
80 TABLET, FILM COATED ORAL DAILY
Qty: 90 TABLET | Refills: 0 | Status: CANCELLED | OUTPATIENT
Start: 2023-10-13

## 2023-10-13 RX ORDER — BUTALBITAL, ACETAMINOPHEN AND CAFFEINE 50; 325; 40 MG/1; MG/1; MG/1
TABLET ORAL EVERY 8 HOURS PRN
Status: CANCELLED | OUTPATIENT
Start: 2023-10-13

## 2023-11-13 ENCOUNTER — CLINICAL ABSTRACT (OUTPATIENT)
Dept: HEALTH INFORMATION MANAGEMENT | Facility: OTHER | Age: 68
End: 2023-11-13

## 2023-11-13 ENCOUNTER — IMAGING SERVICES (OUTPATIENT)
Dept: MAMMOGRAPHY | Age: 68
End: 2023-11-13
Attending: FAMILY MEDICINE

## 2023-11-13 DIAGNOSIS — Z12.31 ENCOUNTER FOR SCREENING MAMMOGRAM FOR MALIGNANT NEOPLASM OF BREAST: ICD-10-CM

## 2023-11-13 PROCEDURE — 77063 BREAST TOMOSYNTHESIS BI: CPT | Performed by: RADIOLOGY

## 2023-11-13 PROCEDURE — 77067 SCR MAMMO BI INCL CAD: CPT | Performed by: RADIOLOGY

## 2023-11-14 DIAGNOSIS — R92.30 DENSE BREAST TISSUE: Primary | ICD-10-CM

## 2023-12-05 ENCOUNTER — APPOINTMENT (OUTPATIENT)
Dept: OPTOMETRY | Age: 68
End: 2023-12-05

## 2023-12-05 DIAGNOSIS — H52.4 PRESBYOPIA: ICD-10-CM

## 2023-12-05 DIAGNOSIS — H52.13 MYOPIA OF BOTH EYES: ICD-10-CM

## 2023-12-05 DIAGNOSIS — H25.813 COMBINED FORMS OF AGE-RELATED CATARACT OF BOTH EYES: Primary | ICD-10-CM

## 2023-12-05 DIAGNOSIS — H52.222 REGULAR ASTIGMATISM OF LEFT EYE: ICD-10-CM

## 2023-12-05 PROCEDURE — 92015 DETERMINE REFRACTIVE STATE: CPT | Performed by: OPTOMETRIST

## 2023-12-05 PROCEDURE — 92002 INTRM OPH EXAM NEW PATIENT: CPT | Performed by: OPTOMETRIST

## 2023-12-05 ASSESSMENT — CONF VISUAL FIELD
OD_SUPERIOR_NASAL_RESTRICTION: 0
OS_INFERIOR_TEMPORAL_RESTRICTION: 0
OS_NORMAL: 1
OD_INFERIOR_NASAL_RESTRICTION: 0
OS_SUPERIOR_NASAL_RESTRICTION: 0
OS_INFERIOR_NASAL_RESTRICTION: 0
OD_NORMAL: 1
OS_SUPERIOR_TEMPORAL_RESTRICTION: 0
OD_INFERIOR_TEMPORAL_RESTRICTION: 0
OD_SUPERIOR_TEMPORAL_RESTRICTION: 0

## 2023-12-05 ASSESSMENT — REFRACTION_WEARINGRX
OS_CYLINDER: SPHERE
OD_CYLINDER: SPHERE
OD_ADD: +3.00
OS_ADD: +3.00
OD_SPHERE: -3.75
OS_SPHERE: -3.75

## 2023-12-05 ASSESSMENT — VISUAL ACUITY
METHOD: SNELLEN - LINEAR
OD_CC+: -3
OS_CC+: +2
OS_CC: 20/25
OD_CC: 20/25
OD_CC: JAEGER 1
CORRECTION_TYPE: GLASSES

## 2023-12-05 ASSESSMENT — REFRACTION_MANIFEST
OD_SPHERE: -4.00
OD_AXIS: 015
OD_CYLINDER: +0.75
OD_ADD: +3.00
OS_ADD: +3.00
OS_SPHERE: -3.75

## 2023-12-06 ASSESSMENT — KERATOMETRY
OS_K1POWER_DIOPTERS: 42.50
OD_K2POWER_DIOPTERS: 43.00
OD_K1POWER_DIOPTERS: 42.50
OD_AXISANGLE_DEGREES: 090
OS_AXISANGLE2_DEGREES: 180
OS_AXISANGLE_DEGREES: 090
OD_AXISANGLE2_DEGREES: 180
OS_K2POWER_DIOPTERS: 43.25

## 2023-12-06 ASSESSMENT — VISUAL ACUITY
OS_BAT_HIGH: 20/60
OD_BAT_HIGH: 20/200

## 2023-12-06 ASSESSMENT — EXTERNAL EXAM - LEFT EYE: OS_EXAM: NORMAL

## 2023-12-06 ASSESSMENT — SLIT LAMP EXAM - LIDS
COMMENTS: NORMAL
COMMENTS: NORMAL

## 2023-12-06 ASSESSMENT — EXTERNAL EXAM - RIGHT EYE: OD_EXAM: NORMAL

## 2023-12-13 ENCOUNTER — LAB SERVICES (OUTPATIENT)
Dept: LAB | Age: 68
End: 2023-12-13

## 2023-12-13 ENCOUNTER — TELEPHONE (OUTPATIENT)
Dept: INTERNAL MEDICINE | Age: 68
End: 2023-12-13

## 2023-12-13 DIAGNOSIS — K25.1 ACUTE GASTRIC ULCER WITH PERFORATION (CMD): ICD-10-CM

## 2023-12-13 DIAGNOSIS — N18.31 STAGE 3A CHRONIC KIDNEY DISEASE (CMD): ICD-10-CM

## 2023-12-13 DIAGNOSIS — N18.31 STAGE 3A CHRONIC KIDNEY DISEASE (CMD): Primary | ICD-10-CM

## 2023-12-13 DIAGNOSIS — R10.30 LOWER ABDOMINAL PAIN: ICD-10-CM

## 2023-12-13 LAB
ALBUMIN SERPL-MCNC: 3.6 G/DL (ref 3.6–5.1)
ALBUMIN/GLOB SERPL: 1.2 {RATIO} (ref 1–2.4)
ALP SERPL-CCNC: 81 UNITS/L (ref 45–117)
ALT SERPL-CCNC: 21 UNITS/L
ANION GAP SERPL CALC-SCNC: 16 MMOL/L (ref 7–19)
AST SERPL-CCNC: 24 UNITS/L
BASOPHILS # BLD: 0.1 K/MCL (ref 0–0.3)
BASOPHILS NFR BLD: 1 %
BILIRUB SERPL-MCNC: 0.3 MG/DL (ref 0.2–1)
BUN SERPL-MCNC: 18 MG/DL (ref 6–20)
BUN/CREAT SERPL: 16 (ref 7–25)
CALCIUM SERPL-MCNC: 9 MG/DL (ref 8.4–10.2)
CHLORIDE SERPL-SCNC: 109 MMOL/L (ref 97–110)
CO2 SERPL-SCNC: 22 MMOL/L (ref 21–32)
CREAT SERPL-MCNC: 1.1 MG/DL (ref 0.51–0.95)
DEPRECATED RDW RBC: 57.7 FL (ref 39–50)
EGFRCR SERPLBLD CKD-EPI 2021: 54 ML/MIN/{1.73_M2}
EOSINOPHIL # BLD: 0.3 K/MCL (ref 0–0.5)
EOSINOPHIL NFR BLD: 3 %
ERYTHROCYTE [DISTWIDTH] IN BLOOD: 15.1 % (ref 11–15)
FASTING DURATION TIME PATIENT: ABNORMAL H
GLOBULIN SER-MCNC: 2.9 G/DL (ref 2–4)
GLUCOSE SERPL-MCNC: 80 MG/DL (ref 70–99)
HCT VFR BLD CALC: 38.8 % (ref 36–46.5)
HGB BLD-MCNC: 12.1 G/DL (ref 12–15.5)
IMM GRANULOCYTES # BLD AUTO: 0 K/MCL (ref 0–0.2)
IMM GRANULOCYTES # BLD: 0 %
LYMPHOCYTES # BLD: 2.1 K/MCL (ref 1–4)
LYMPHOCYTES NFR BLD: 26 %
MCH RBC QN AUTO: 32.1 PG (ref 26–34)
MCHC RBC AUTO-ENTMCNC: 31.2 G/DL (ref 32–36.5)
MCV RBC AUTO: 102.9 FL (ref 78–100)
MONOCYTES # BLD: 0.7 K/MCL (ref 0.3–0.9)
MONOCYTES NFR BLD: 8 %
NEUTROPHILS # BLD: 5 K/MCL (ref 1.8–7.7)
NEUTROPHILS NFR BLD: 62 %
NRBC BLD MANUAL-RTO: 0 /100 WBC
PLATELET # BLD AUTO: 341 K/MCL (ref 140–450)
POTASSIUM SERPL-SCNC: 4.7 MMOL/L (ref 3.4–5.1)
PROT SERPL-MCNC: 6.5 G/DL (ref 6.4–8.2)
RBC # BLD: 3.77 MIL/MCL (ref 4–5.2)
SODIUM SERPL-SCNC: 142 MMOL/L (ref 135–145)
WBC # BLD: 8.1 K/MCL (ref 4.2–11)

## 2023-12-13 PROCEDURE — 80053 COMPREHEN METABOLIC PANEL: CPT | Performed by: INTERNAL MEDICINE

## 2023-12-13 PROCEDURE — 85025 COMPLETE CBC W/AUTO DIFF WBC: CPT | Performed by: INTERNAL MEDICINE

## 2023-12-13 PROCEDURE — 36415 COLL VENOUS BLD VENIPUNCTURE: CPT | Performed by: FAMILY MEDICINE

## 2023-12-14 ENCOUNTER — OFFICE VISIT (OUTPATIENT)
Dept: OPTOMETRY | Age: 68
End: 2023-12-14

## 2023-12-14 DIAGNOSIS — H25.813 COMBINED FORMS OF AGE-RELATED CATARACT OF BOTH EYES: Primary | ICD-10-CM

## 2023-12-14 PROCEDURE — 99213 OFFICE O/P EST LOW 20 MIN: CPT | Performed by: OPTOMETRIST

## 2023-12-14 ASSESSMENT — VISUAL ACUITY
METHOD: SNELLEN - LINEAR
OS_CC: 20/30
OS_CC+: -2
OD_CC: 20/40

## 2023-12-14 ASSESSMENT — CONF VISUAL FIELD
OS_NORMAL: 1
OS_SUPERIOR_TEMPORAL_RESTRICTION: 0
OD_INFERIOR_NASAL_RESTRICTION: 0
OD_INFERIOR_TEMPORAL_RESTRICTION: 0
OS_SUPERIOR_NASAL_RESTRICTION: 0
OD_NORMAL: 1
OD_SUPERIOR_TEMPORAL_RESTRICTION: 0
OS_INFERIOR_TEMPORAL_RESTRICTION: 0
OD_SUPERIOR_NASAL_RESTRICTION: 0
OS_INFERIOR_NASAL_RESTRICTION: 0

## 2023-12-14 ASSESSMENT — EXTERNAL EXAM - LEFT EYE: OS_EXAM: NORMAL

## 2023-12-14 ASSESSMENT — SLIT LAMP EXAM - LIDS
COMMENTS: NORMAL
COMMENTS: NORMAL

## 2023-12-14 ASSESSMENT — TONOMETRY
OD_IOP_MMHG: 16
OS_IOP_MMHG: 15

## 2023-12-14 ASSESSMENT — CUP TO DISC RATIO
OS_RATIO: 0.3
OD_RATIO: 0.3

## 2023-12-14 ASSESSMENT — EXTERNAL EXAM - RIGHT EYE: OD_EXAM: NORMAL

## 2023-12-18 ENCOUNTER — APPOINTMENT (OUTPATIENT)
Dept: INTERNAL MEDICINE | Age: 68
End: 2023-12-18

## 2023-12-18 VITALS
WEIGHT: 112 LBS | DIASTOLIC BLOOD PRESSURE: 86 MMHG | TEMPERATURE: 96.9 F | HEIGHT: 66 IN | OXYGEN SATURATION: 99 % | BODY MASS INDEX: 18 KG/M2 | HEART RATE: 60 BPM | RESPIRATION RATE: 15 BRPM | SYSTOLIC BLOOD PRESSURE: 150 MMHG

## 2023-12-18 DIAGNOSIS — R91.1 LUNG NODULE: ICD-10-CM

## 2023-12-18 DIAGNOSIS — I10 PRIMARY HYPERTENSION: Primary | ICD-10-CM

## 2023-12-18 DIAGNOSIS — E44.1 MILD PROTEIN-CALORIE MALNUTRITION (CMD): ICD-10-CM

## 2023-12-18 DIAGNOSIS — N20.0 CALCULUS OF KIDNEY: ICD-10-CM

## 2023-12-18 DIAGNOSIS — E78.2 MIXED HYPERLIPIDEMIA: ICD-10-CM

## 2023-12-18 DIAGNOSIS — Z95.828 HISTORY OF ENDOVASCULAR STENT GRAFT FOR ABDOMINAL AORTIC ANEURYSM (AAA): ICD-10-CM

## 2023-12-18 DIAGNOSIS — Z72.0 TOBACCO USE: ICD-10-CM

## 2023-12-18 DIAGNOSIS — N18.32 STAGE 3B CHRONIC KIDNEY DISEASE (CMD): ICD-10-CM

## 2023-12-18 DIAGNOSIS — G43.809 OTHER MIGRAINE WITHOUT STATUS MIGRAINOSUS, NOT INTRACTABLE: ICD-10-CM

## 2023-12-18 DIAGNOSIS — Z86.711 HISTORY OF PULMONARY EMBOLISM: ICD-10-CM

## 2023-12-18 DIAGNOSIS — A49.8 RECURRENT CLOSTRIDIOIDES DIFFICILE INFECTION: ICD-10-CM

## 2023-12-18 PROCEDURE — 99214 OFFICE O/P EST MOD 30 MIN: CPT | Performed by: FAMILY MEDICINE

## 2023-12-18 PROCEDURE — 3077F SYST BP >= 140 MM HG: CPT | Performed by: FAMILY MEDICINE

## 2023-12-18 PROCEDURE — 3079F DIAST BP 80-89 MM HG: CPT | Performed by: FAMILY MEDICINE

## 2023-12-18 ASSESSMENT — PAIN SCALES - GENERAL: PAINLEVEL: 0

## 2023-12-18 ASSESSMENT — PATIENT HEALTH QUESTIONNAIRE - PHQ9
1. LITTLE INTEREST OR PLEASURE IN DOING THINGS: NOT AT ALL
SUM OF ALL RESPONSES TO PHQ9 QUESTIONS 1 AND 2: 0
CLINICAL INTERPRETATION OF PHQ2 SCORE: NO FURTHER SCREENING NEEDED
SUM OF ALL RESPONSES TO PHQ9 QUESTIONS 1 AND 2: 0
2. FEELING DOWN, DEPRESSED OR HOPELESS: NOT AT ALL

## 2023-12-22 ENCOUNTER — TELEPHONE (OUTPATIENT)
Dept: OPTOMETRY | Age: 68
End: 2023-12-22

## 2024-01-07 ASSESSMENT — ENCOUNTER SYMPTOMS
CONSTITUTIONAL NEGATIVE: 1
PSYCHIATRIC NEGATIVE: 1
EYES NEGATIVE: 1
GASTROINTESTINAL NEGATIVE: 1
NEUROLOGICAL NEGATIVE: 1
RESPIRATORY NEGATIVE: 1
HEMATOLOGIC/LYMPHATIC NEGATIVE: 1
ENDOCRINE NEGATIVE: 1

## 2024-01-09 ENCOUNTER — TELEPHONE (OUTPATIENT)
Dept: CARDIOLOGY | Age: 69
End: 2024-01-09

## 2024-01-10 ENCOUNTER — APPOINTMENT (OUTPATIENT)
Dept: CARDIOLOGY | Age: 69
End: 2024-01-10

## 2024-01-15 RX ORDER — TOPIRAMATE 100 MG/1
100 TABLET, FILM COATED ORAL 2 TIMES DAILY
Qty: 180 TABLET | Refills: 1 | Status: SHIPPED | OUTPATIENT
Start: 2024-01-15

## 2024-01-15 RX ORDER — VENLAFAXINE HYDROCHLORIDE 75 MG/1
75 CAPSULE, EXTENDED RELEASE ORAL EVERY MORNING
Qty: 90 CAPSULE | Refills: 1 | Status: SHIPPED | OUTPATIENT
Start: 2024-01-15

## 2024-02-14 ENCOUNTER — OFFICE VISIT (OUTPATIENT)
Dept: INTERNAL MEDICINE | Age: 69
End: 2024-02-14

## 2024-02-14 ENCOUNTER — TELEPHONE (OUTPATIENT)
Dept: INTERNAL MEDICINE | Age: 69
End: 2024-02-14

## 2024-02-14 VITALS
HEART RATE: 82 BPM | DIASTOLIC BLOOD PRESSURE: 72 MMHG | BODY MASS INDEX: 18.32 KG/M2 | OXYGEN SATURATION: 100 % | SYSTOLIC BLOOD PRESSURE: 138 MMHG | HEIGHT: 66 IN | TEMPERATURE: 98 F | WEIGHT: 114 LBS

## 2024-02-14 DIAGNOSIS — G43.001 MIGRAINE WITHOUT AURA AND WITH STATUS MIGRAINOSUS, NOT INTRACTABLE: Primary | ICD-10-CM

## 2024-02-14 DIAGNOSIS — Z72.0 TOBACCO USE: ICD-10-CM

## 2024-02-14 RX ORDER — PREDNISONE 10 MG/1
TABLET ORAL
Qty: 14 TABLET | Refills: 0 | Status: SHIPPED | OUTPATIENT
Start: 2024-02-14

## 2024-02-16 LAB
*MEAN CORPUSCULAR HGB CONC: 32.2 G/DL (ref 32.5–35.8)
A/G RATIO: 1.74 (ref 1.1–2.4)
ALANINE AMINOTRANSFE: 10 U/L (ref 7–52)
ALBUMIN, SERUM (ALB): 4 G/DL (ref 3.5–5.7)
ALKALINE PHOSPHATASE (ALK): 46 U/L (ref 34–104)
ANION GAP: 8 MEQ/L (ref 6.2–14.7)
ASPARTATE AMINOTRANS: 19 U/L (ref 13–39)
BASOPHIL AUTOMATED: 0.5 %
BASOPHILS: 0.1 (ref 0–0.14)
BILIRUBIN, TOTAL: 0.2 MG/DL (ref 0.3–1)
BLOOD UREA NITROGEN (BUN): 29 MG/DL (ref 7–25)
BUN/CREATININE RATIO: 17 (ref 7.4–23)
CALCIUM, SERUM: 8.6 MG/DL (ref 8.6–10.3)
CARBON DIOXIDE: 17 MEQ/L (ref 21–31)
CHLORIDE, SERUM: 117 MMOL/L (ref 98–107)
CHRONIC KIDNEY DISEASE STAGE: ABNORMAL
CREATININE: 1.71 MG/DL (ref 0.6–1.2)
EOSINOPHIL AUTOMATED: 0.6 %
EOSINOPHILS: 0.1 (ref 0–0.6)
EST GLOMERULAR FILTRATION RATE: 32 ML/MIN
GLUCOSE: 79 MG/DL (ref 70–99)
HEMATOCRIT: 36.4 % (ref 34.7–45.1)
HEMOGLOBIN: 11.7 GM/DL (ref 12–15.3)
K (POTASSIUM, SERUM): 3.7 MMOL/L (ref 3.5–5.2)
LIPASE: 68 U/L (ref 11–82)
LYMPHOCYTE AUTOMATED: 17.6 %
LYMPHOCYTES: 1.7 (ref 0.78–3.73)
MEAN CORPUSCULAR HGB: 32.7 PG (ref 26–34)
MEAN CORPUSCULAR VOL: 101.5 FL (ref 80–100)
MEAN PLATELET VOLUME: 7.8 FL (ref 6.8–10.2)
MONOCYTE AUTOMATED: 5.5 %
MONOCYTES: 0.5 (ref 0.17–1)
NA (SODIUM, SERUM): 142 MMOL/L (ref 133–144)
NEUTROPHIL ABSOLUTE: 7.5 (ref 1.91–7.6)
NEUTROPHIL AUTOMATED: 75.8 %
PLATELET COUNT: 296 K/MM3 (ref 150–450)
PROTEIN TOTAL: 6.3 G/DL (ref 6.4–8.9)
RED BLOOD CELL COUNT: 3.59 M/MM3 (ref 3.63–5.04)
RED CELL DISTRIBUTIO: 15.8 % (ref 11.9–15.9)
TROPONIN I HIGH SENSITIVITY: 6 PG/ML (ref 0–12)
WHITE BLOOD CELL COU: 9.9 K/MM3 (ref 4–11)

## 2024-02-19 ENCOUNTER — TELEPHONE (OUTPATIENT)
Dept: INTERNAL MEDICINE | Age: 69
End: 2024-02-19

## 2024-02-19 RX ORDER — PREDNISONE 10 MG/1
TABLET ORAL
Qty: 4 TABLET | Refills: 0 | Status: SHIPPED | OUTPATIENT
Start: 2024-02-19 | End: 2024-02-25

## 2024-02-19 RX ORDER — PREDNISONE 10 MG/1
TABLET ORAL
Qty: 14 TABLET | Refills: 0 | Status: CANCELLED | OUTPATIENT
Start: 2024-02-19

## 2024-02-24 PROBLEM — N18.32 STAGE 3B CHRONIC KIDNEY DISEASE (CMD): Status: ACTIVE | Noted: 2018-12-28

## 2024-02-26 ENCOUNTER — TELEPHONE (OUTPATIENT)
Dept: OPHTHALMOLOGY | Age: 69
End: 2024-02-26

## 2024-02-28 ENCOUNTER — APPOINTMENT (OUTPATIENT)
Dept: CARDIOLOGY | Age: 69
End: 2024-02-28

## 2024-02-28 ENCOUNTER — TELEPHONE (OUTPATIENT)
Dept: CT IMAGING | Age: 69
End: 2024-02-28

## 2024-02-28 VITALS
OXYGEN SATURATION: 98 % | BODY MASS INDEX: 18.32 KG/M2 | SYSTOLIC BLOOD PRESSURE: 120 MMHG | HEART RATE: 65 BPM | DIASTOLIC BLOOD PRESSURE: 70 MMHG | HEIGHT: 66 IN | WEIGHT: 114 LBS | RESPIRATION RATE: 18 BRPM

## 2024-02-28 DIAGNOSIS — N18.32 STAGE 3B CHRONIC KIDNEY DISEASE (CMD): ICD-10-CM

## 2024-02-28 DIAGNOSIS — Z86.711 HISTORY OF PULMONARY EMBOLISM: ICD-10-CM

## 2024-02-28 DIAGNOSIS — I25.119 CORONARY ARTERY DISEASE INVOLVING NATIVE CORONARY ARTERY OF NATIVE HEART WITH ANGINA PECTORIS (CMD): ICD-10-CM

## 2024-02-28 DIAGNOSIS — G45.9 TIA (TRANSIENT ISCHEMIC ATTACK): ICD-10-CM

## 2024-02-28 DIAGNOSIS — Z95.828 HISTORY OF ENDOVASCULAR STENT GRAFT FOR ABDOMINAL AORTIC ANEURYSM (AAA): Primary | ICD-10-CM

## 2024-02-28 DIAGNOSIS — E78.2 MIXED HYPERLIPIDEMIA: ICD-10-CM

## 2024-02-28 DIAGNOSIS — I10 PRIMARY HYPERTENSION: ICD-10-CM

## 2024-02-28 DIAGNOSIS — Z98.890 S/P CARDIAC CATH: ICD-10-CM

## 2024-02-28 RX ORDER — ATORVASTATIN CALCIUM 80 MG/1
80 TABLET, FILM COATED ORAL DAILY
Qty: 90 TABLET | Refills: 3 | Status: SHIPPED | OUTPATIENT
Start: 2024-02-28

## 2024-02-28 ASSESSMENT — ENCOUNTER SYMPTOMS
RESPIRATORY NEGATIVE: 1
CONSTITUTIONAL NEGATIVE: 1
GASTROINTESTINAL NEGATIVE: 1
EYES NEGATIVE: 1
NEUROLOGICAL NEGATIVE: 1
ALLERGIC/IMMUNOLOGIC NEGATIVE: 1
HEMATOLOGIC/LYMPHATIC NEGATIVE: 1
ENDOCRINE NEGATIVE: 1
PSYCHIATRIC NEGATIVE: 1

## 2024-03-04 PROCEDURE — 93010 ELECTROCARDIOGRAM REPORT: CPT | Performed by: INTERNAL MEDICINE

## 2024-03-19 ENCOUNTER — APPOINTMENT (OUTPATIENT)
Dept: OPTOMETRY | Age: 69
End: 2024-03-19

## 2024-03-19 DIAGNOSIS — H52.13 MYOPIA OF BOTH EYES: Primary | ICD-10-CM

## 2024-03-19 DIAGNOSIS — H52.4 PRESBYOPIA: ICD-10-CM

## 2024-03-19 PROCEDURE — 92310 CONTACT LENS FITTING OU: CPT | Performed by: OPTOMETRIST

## 2024-03-19 ASSESSMENT — REFRACTION_CURRENTRX
OD_SPHERE: -3.75
OD_SPHERE: -3.50
OD_DIAMETER: 14.2
OD_BASECURVE: 8.6
OD_BRAND: AIR OPTIX HYDRAGLYDE
OD_DIAMETER: 14.2
OS_BASECURVE: 8.6
OD_BASECURVE: 8.6
OS_BRAND: AIR OPTIX HYDRAGLYDE
OD_BRAND: AIR OPTIX HYDRAGLYDE
OS_DIAMETER: 14.2
OS_SPHERE: -1.75

## 2024-03-19 ASSESSMENT — REFRACTION_WEARINGRX
OD_SPHERE: -4.00
OS_ADD: +3.00
OD_AXIS: 015
OD_ADD: +3.00
OS_SPHERE: -3.75
OD_CYLINDER: +0.75

## 2024-03-19 ASSESSMENT — VISUAL ACUITY
OD_CC+: -3
CORRECTION_TYPE: GLASSES
VA_OR_OD_CURRENT_RX: 20/20-
OD_CC: 20/20
OS_CC: 20/25
OS_CC+: +2
METHOD: SNELLEN - LINEAR

## 2024-03-19 ASSESSMENT — SLIT LAMP EXAM - LIDS
COMMENTS: NORMAL
COMMENTS: NORMAL

## 2024-03-19 ASSESSMENT — EXTERNAL EXAM - RIGHT EYE: OD_EXAM: NORMAL

## 2024-03-19 ASSESSMENT — EXTERNAL EXAM - LEFT EYE: OS_EXAM: NORMAL

## 2024-03-20 ENCOUNTER — TELEPHONE (OUTPATIENT)
Dept: OPTOMETRY | Age: 69
End: 2024-03-20

## 2024-04-01 ENCOUNTER — TELEPHONE (OUTPATIENT)
Dept: OPTOMETRY | Age: 69
End: 2024-04-01

## 2024-04-19 RX ORDER — BUTALBITAL, ACETAMINOPHEN AND CAFFEINE 50; 325; 40 MG/1; MG/1; MG/1
1 TABLET ORAL EVERY 6 HOURS PRN
Qty: 30 TABLET | Refills: 0 | Status: SHIPPED | OUTPATIENT
Start: 2024-04-19

## 2024-05-17 ENCOUNTER — LAB SERVICES (OUTPATIENT)
Dept: LAB | Age: 69
End: 2024-05-17

## 2024-05-17 DIAGNOSIS — Z98.890 S/P AAA REPAIR: ICD-10-CM

## 2024-05-17 DIAGNOSIS — Z86.79 S/P AAA REPAIR: ICD-10-CM

## 2024-05-17 LAB
ALBUMIN SERPL-MCNC: 3.6 G/DL (ref 3.6–5.1)
ALBUMIN/GLOB SERPL: 1.3 {RATIO} (ref 1–2.4)
ALP SERPL-CCNC: 91 UNITS/L (ref 45–117)
ALT SERPL-CCNC: 22 UNITS/L
ANION GAP SERPL CALC-SCNC: 14 MMOL/L (ref 7–19)
AST SERPL-CCNC: 19 UNITS/L
BILIRUB SERPL-MCNC: 0.2 MG/DL (ref 0.2–1)
BUN SERPL-MCNC: 20 MG/DL (ref 6–20)
BUN/CREAT SERPL: 18 (ref 7–25)
CALCIUM SERPL-MCNC: 8.9 MG/DL (ref 8.4–10.2)
CHLORIDE SERPL-SCNC: 108 MMOL/L (ref 97–110)
CO2 SERPL-SCNC: 25 MMOL/L (ref 21–32)
CREAT SERPL-MCNC: 1.14 MG/DL (ref 0.51–0.95)
EGFRCR SERPLBLD CKD-EPI 2021: 52 ML/MIN/{1.73_M2}
FASTING DURATION TIME PATIENT: ABNORMAL H
GLOBULIN SER-MCNC: 2.8 G/DL (ref 2–4)
GLUCOSE SERPL-MCNC: 84 MG/DL (ref 70–99)
POTASSIUM SERPL-SCNC: 4.8 MMOL/L (ref 3.4–5.1)
PROT SERPL-MCNC: 6.4 G/DL (ref 6.4–8.2)
SODIUM SERPL-SCNC: 142 MMOL/L (ref 135–145)

## 2024-05-17 PROCEDURE — 36415 COLL VENOUS BLD VENIPUNCTURE: CPT | Performed by: INTERNAL MEDICINE

## 2024-05-17 PROCEDURE — 80053 COMPREHEN METABOLIC PANEL: CPT | Performed by: INTERNAL MEDICINE

## 2024-05-22 ENCOUNTER — APPOINTMENT (OUTPATIENT)
Dept: CT IMAGING | Age: 69
End: 2024-05-22
Attending: INTERNAL MEDICINE

## 2024-05-22 DIAGNOSIS — Z95.828 HISTORY OF ENDOVASCULAR STENT GRAFT FOR ABDOMINAL AORTIC ANEURYSM (AAA): ICD-10-CM

## 2024-05-22 PROCEDURE — 74174 CTA ABD&PLVS W/CONTRAST: CPT | Performed by: RADIOLOGY

## 2024-06-10 RX ORDER — VENLAFAXINE HYDROCHLORIDE 75 MG/1
75 CAPSULE, EXTENDED RELEASE ORAL EVERY MORNING
Qty: 90 CAPSULE | Refills: 1 | Status: SHIPPED | OUTPATIENT
Start: 2024-06-10

## 2024-06-10 RX ORDER — TOPIRAMATE 100 MG/1
TABLET, FILM COATED ORAL
Qty: 180 TABLET | Refills: 1 | Status: SHIPPED | OUTPATIENT
Start: 2024-06-10

## 2024-06-18 ENCOUNTER — TELEPHONE (OUTPATIENT)
Dept: INTERNAL MEDICINE | Age: 69
End: 2024-06-18

## 2024-06-18 DIAGNOSIS — R19.7 DIARRHEA, UNSPECIFIED TYPE: Primary | ICD-10-CM

## 2024-06-28 ENCOUNTER — APPOINTMENT (OUTPATIENT)
Dept: INTERNAL MEDICINE | Age: 69
End: 2024-06-28

## 2024-06-28 VITALS
HEART RATE: 64 BPM | BODY MASS INDEX: 18.32 KG/M2 | HEIGHT: 66 IN | WEIGHT: 114 LBS | DIASTOLIC BLOOD PRESSURE: 76 MMHG | TEMPERATURE: 97.4 F | SYSTOLIC BLOOD PRESSURE: 118 MMHG

## 2024-06-28 DIAGNOSIS — Z72.0 TOBACCO USE: ICD-10-CM

## 2024-06-28 DIAGNOSIS — N18.32 STAGE 3B CHRONIC KIDNEY DISEASE  (CMD): ICD-10-CM

## 2024-06-28 DIAGNOSIS — R91.1 LUNG NODULE: ICD-10-CM

## 2024-06-28 DIAGNOSIS — Z00.00 PE (PHYSICAL EXAM), ROUTINE: Primary | ICD-10-CM

## 2024-06-28 DIAGNOSIS — I10 PRIMARY HYPERTENSION: ICD-10-CM

## 2024-06-28 DIAGNOSIS — E78.2 MIXED HYPERLIPIDEMIA: ICD-10-CM

## 2024-06-28 DIAGNOSIS — Z95.828 HISTORY OF ENDOVASCULAR STENT GRAFT FOR ABDOMINAL AORTIC ANEURYSM (AAA): ICD-10-CM

## 2024-06-28 DIAGNOSIS — A49.8 RECURRENT CLOSTRIDIOIDES DIFFICILE INFECTION: ICD-10-CM

## 2024-06-28 DIAGNOSIS — Z78.0 POST-MENOPAUSAL: ICD-10-CM

## 2024-06-28 DIAGNOSIS — E44.1 MILD PROTEIN-CALORIE MALNUTRITION  (CMD): ICD-10-CM

## 2024-06-28 DIAGNOSIS — Z12.11 COLON CANCER SCREENING: ICD-10-CM

## 2024-06-28 DIAGNOSIS — N20.0 CALCULUS OF KIDNEY: ICD-10-CM

## 2024-06-28 DIAGNOSIS — M51.37 DEGENERATION OF LUMBAR OR LUMBOSACRAL INTERVERTEBRAL DISC: ICD-10-CM

## 2024-06-28 DIAGNOSIS — G43.809 OTHER MIGRAINE WITHOUT STATUS MIGRAINOSUS, NOT INTRACTABLE: ICD-10-CM

## 2024-06-28 PROBLEM — Z98.890 S/P CARDIAC CATH: Status: RESOLVED | Noted: 2019-03-20 | Resolved: 2024-06-28

## 2024-06-28 PROBLEM — Z96.642 STATUS POST TOTAL REPLACEMENT OF LEFT HIP: Status: RESOLVED | Noted: 2021-07-13 | Resolved: 2024-06-28

## 2024-06-28 PROBLEM — Z86.16 HISTORY OF COVID-19: Status: RESOLVED | Noted: 2022-06-01 | Resolved: 2024-06-28

## 2024-06-28 PROBLEM — M16.32 OSTEOARTHRITIS RESULTING FROM LEFT HIP DYSPLASIA: Status: RESOLVED | Noted: 2018-10-23 | Resolved: 2024-06-28

## 2024-06-28 PROBLEM — Z86.711 HISTORY OF PULMONARY EMBOLISM: Status: RESOLVED | Noted: 2018-09-01 | Resolved: 2024-06-28

## 2024-06-28 RX ORDER — BUTALBITAL, ACETAMINOPHEN AND CAFFEINE 50; 325; 40 MG/1; MG/1; MG/1
1 TABLET ORAL EVERY 6 HOURS PRN
Qty: 30 TABLET | Refills: 0 | Status: SHIPPED | OUTPATIENT
Start: 2024-06-28

## 2024-06-28 ASSESSMENT — PATIENT HEALTH QUESTIONNAIRE - PHQ9
SUM OF ALL RESPONSES TO PHQ9 QUESTIONS 1 AND 2: 0
SUM OF ALL RESPONSES TO PHQ9 QUESTIONS 1 AND 2: 0
1. LITTLE INTEREST OR PLEASURE IN DOING THINGS: NOT AT ALL
2. FEELING DOWN, DEPRESSED OR HOPELESS: NOT AT ALL
CLINICAL INTERPRETATION OF PHQ2 SCORE: NO FURTHER SCREENING NEEDED

## 2024-08-07 ENCOUNTER — APPOINTMENT (OUTPATIENT)
Dept: BONE DENSITY | Age: 69
End: 2024-08-07
Attending: FAMILY MEDICINE

## 2024-08-07 DIAGNOSIS — Z78.0 POST-MENOPAUSAL: ICD-10-CM

## 2024-08-07 PROCEDURE — 77080 DXA BONE DENSITY AXIAL: CPT | Performed by: RADIOLOGY

## 2024-08-09 ENCOUNTER — TELEPHONE (OUTPATIENT)
Dept: INTERNAL MEDICINE | Age: 69
End: 2024-08-09

## 2024-08-26 RX ORDER — PANTOPRAZOLE SODIUM 40 MG/1
TABLET, DELAYED RELEASE ORAL
Qty: 60 TABLET | Refills: 2 | Status: SHIPPED | OUTPATIENT
Start: 2024-08-26

## 2024-09-09 ENCOUNTER — IMAGING SERVICES (OUTPATIENT)
Dept: GENERAL RADIOLOGY | Age: 69
End: 2024-09-09
Attending: FAMILY MEDICINE

## 2024-09-09 ENCOUNTER — WALK IN (OUTPATIENT)
Dept: URGENT CARE | Age: 69
End: 2024-09-09

## 2024-09-09 VITALS
TEMPERATURE: 97.5 F | DIASTOLIC BLOOD PRESSURE: 88 MMHG | RESPIRATION RATE: 16 BRPM | OXYGEN SATURATION: 100 % | HEART RATE: 70 BPM | SYSTOLIC BLOOD PRESSURE: 122 MMHG

## 2024-09-09 DIAGNOSIS — M25.562 ACUTE PAIN OF LEFT KNEE: Primary | ICD-10-CM

## 2024-09-09 DIAGNOSIS — R52 PAIN: ICD-10-CM

## 2024-09-09 PROBLEM — R19.8 PERFORATED ABDOMINAL VISCUS: Status: RESOLVED | Noted: 2023-04-15 | Resolved: 2024-09-09

## 2024-09-09 PROBLEM — E78.2 MIXED HYPERLIPIDEMIA: Status: ACTIVE | Noted: 2018-12-28

## 2024-09-09 PROBLEM — K26.5: Status: RESOLVED | Noted: 2023-06-23 | Resolved: 2024-09-09

## 2024-09-09 PROCEDURE — 99214 OFFICE O/P EST MOD 30 MIN: CPT | Performed by: FAMILY MEDICINE

## 2024-09-09 PROCEDURE — 73564 X-RAY EXAM KNEE 4 OR MORE: CPT | Performed by: RADIOLOGY

## 2024-09-09 PROCEDURE — 3079F DIAST BP 80-89 MM HG: CPT | Performed by: FAMILY MEDICINE

## 2024-09-09 PROCEDURE — 3074F SYST BP LT 130 MM HG: CPT | Performed by: FAMILY MEDICINE

## 2024-09-09 RX ORDER — MELOXICAM 15 MG/1
15 TABLET ORAL DAILY
Qty: 15 TABLET | Refills: 0 | Status: SHIPPED | OUTPATIENT
Start: 2024-09-09 | End: 2024-09-24

## 2024-09-09 ASSESSMENT — ENCOUNTER SYMPTOMS
EYES NEGATIVE: 1
ALLERGIC/IMMUNOLOGIC NEGATIVE: 1
PSYCHIATRIC NEGATIVE: 1
ENDOCRINE NEGATIVE: 1
RESPIRATORY NEGATIVE: 1
HEMATOLOGIC/LYMPHATIC NEGATIVE: 1
GASTROINTESTINAL NEGATIVE: 1
CONSTITUTIONAL NEGATIVE: 1

## 2024-09-10 ENCOUNTER — TELEPHONE (OUTPATIENT)
Dept: URGENT CARE | Age: 69
End: 2024-09-10

## 2024-09-10 ENCOUNTER — TELEPHONE (OUTPATIENT)
Dept: INTERNAL MEDICINE | Age: 69
End: 2024-09-10

## 2024-09-10 DIAGNOSIS — I25.119 CORONARY ARTERY DISEASE INVOLVING NATIVE CORONARY ARTERY OF NATIVE HEART WITH ANGINA PECTORIS (CMD): ICD-10-CM

## 2024-09-10 DIAGNOSIS — E78.2 MIXED HYPERLIPIDEMIA: ICD-10-CM

## 2024-09-11 DIAGNOSIS — I25.119 CORONARY ARTERY DISEASE INVOLVING NATIVE CORONARY ARTERY OF NATIVE HEART WITH ANGINA PECTORIS (CMD): ICD-10-CM

## 2024-09-11 DIAGNOSIS — I10 ESSENTIAL HYPERTENSION: ICD-10-CM

## 2024-09-11 DIAGNOSIS — I71.40 ABDOMINAL AORTIC ANEURYSM WITHOUT RUPTURE (CMD): ICD-10-CM

## 2024-09-11 RX ORDER — METOPROLOL TARTRATE 25 MG/1
TABLET, FILM COATED ORAL
Qty: 270 TABLET | Refills: 3 | Status: SHIPPED | OUTPATIENT
Start: 2024-09-11

## 2024-09-11 RX ORDER — ATORVASTATIN CALCIUM 80 MG/1
80 TABLET, FILM COATED ORAL DAILY
Qty: 90 TABLET | Refills: 3 | Status: SHIPPED | OUTPATIENT
Start: 2024-09-11

## 2024-09-13 ENCOUNTER — APPOINTMENT (OUTPATIENT)
Dept: CARDIOLOGY | Age: 69
End: 2024-09-13

## 2024-09-13 VITALS
OXYGEN SATURATION: 99 % | DIASTOLIC BLOOD PRESSURE: 74 MMHG | HEIGHT: 66 IN | WEIGHT: 116 LBS | SYSTOLIC BLOOD PRESSURE: 118 MMHG | HEART RATE: 98 BPM | RESPIRATION RATE: 18 BRPM | BODY MASS INDEX: 18.64 KG/M2

## 2024-09-13 DIAGNOSIS — I10 PRIMARY HYPERTENSION: ICD-10-CM

## 2024-09-13 DIAGNOSIS — Z95.828 HISTORY OF ENDOVASCULAR STENT GRAFT FOR ABDOMINAL AORTIC ANEURYSM (AAA): Primary | ICD-10-CM

## 2024-09-13 DIAGNOSIS — Z01.810 PREOP CARDIOVASCULAR EXAM: ICD-10-CM

## 2024-09-13 DIAGNOSIS — G45.9 TIA (TRANSIENT ISCHEMIC ATTACK): ICD-10-CM

## 2024-09-13 DIAGNOSIS — N18.32 STAGE 3B CHRONIC KIDNEY DISEASE  (CMD): ICD-10-CM

## 2024-09-13 DIAGNOSIS — E78.2 MIXED HYPERLIPIDEMIA: ICD-10-CM

## 2024-09-20 DIAGNOSIS — G43.809 OTHER MIGRAINE WITHOUT STATUS MIGRAINOSUS, NOT INTRACTABLE: ICD-10-CM

## 2024-09-20 RX ORDER — BUTALBITAL, ACETAMINOPHEN AND CAFFEINE 50; 325; 40 MG/1; MG/1; MG/1
1 TABLET ORAL EVERY 6 HOURS PRN
Qty: 30 TABLET | Refills: 0 | Status: SHIPPED | OUTPATIENT
Start: 2024-09-20

## 2024-11-11 ENCOUNTER — TELEPHONE (OUTPATIENT)
Dept: GASTROENTEROLOGY | Age: 69
End: 2024-11-11

## 2024-11-11 ENCOUNTER — APPOINTMENT (OUTPATIENT)
Dept: GASTROENTEROLOGY | Age: 69
End: 2024-11-11

## 2024-11-11 VITALS — WEIGHT: 118 LBS | HEIGHT: 66 IN | BODY MASS INDEX: 18.96 KG/M2

## 2024-11-11 DIAGNOSIS — Z12.11 SPECIAL SCREENING FOR MALIGNANT NEOPLASMS, COLON: ICD-10-CM

## 2024-11-11 DIAGNOSIS — K25.9 GASTRIC ULCER, UNSPECIFIED CHRONICITY, UNSPECIFIED WHETHER GASTRIC ULCER HEMORRHAGE OR PERFORATION PRESENT: Primary | ICD-10-CM

## 2024-11-11 PROCEDURE — 99204 OFFICE O/P NEW MOD 45 MIN: CPT | Performed by: INTERNAL MEDICINE

## 2024-11-11 RX ORDER — PANTOPRAZOLE SODIUM 40 MG/1
40 TABLET, DELAYED RELEASE ORAL 2 TIMES DAILY
Qty: 180 TABLET | Refills: 3 | Status: SHIPPED | OUTPATIENT
Start: 2024-11-11

## 2024-11-14 ENCOUNTER — TELEPHONE (OUTPATIENT)
Dept: INTERNAL MEDICINE | Age: 69
End: 2024-11-14

## 2024-11-20 RX ORDER — TOPIRAMATE 100 MG/1
TABLET, FILM COATED ORAL
Qty: 180 TABLET | Refills: 3 | Status: SHIPPED | OUTPATIENT
Start: 2024-11-20

## 2024-11-20 RX ORDER — VENLAFAXINE HYDROCHLORIDE 75 MG/1
75 CAPSULE, EXTENDED RELEASE ORAL EVERY MORNING
Qty: 90 CAPSULE | Refills: 3 | Status: SHIPPED | OUTPATIENT
Start: 2024-11-20

## 2024-11-27 ENCOUNTER — TELEPHONE (OUTPATIENT)
Dept: INTERNAL MEDICINE | Age: 69
End: 2024-11-27

## 2024-11-27 DIAGNOSIS — Z01.818 PRE-OP EVALUATION: Primary | ICD-10-CM

## 2024-11-29 ENCOUNTER — WALK IN (OUTPATIENT)
Dept: URGENT CARE | Age: 69
End: 2024-11-29

## 2024-11-29 VITALS
TEMPERATURE: 97.6 F | RESPIRATION RATE: 20 BRPM | HEART RATE: 80 BPM | DIASTOLIC BLOOD PRESSURE: 74 MMHG | OXYGEN SATURATION: 100 % | SYSTOLIC BLOOD PRESSURE: 104 MMHG

## 2024-11-29 DIAGNOSIS — G43.809 OTHER MIGRAINE WITHOUT STATUS MIGRAINOSUS, NOT INTRACTABLE: ICD-10-CM

## 2024-11-29 DIAGNOSIS — J06.9 UPPER RESPIRATORY TRACT INFECTION, UNSPECIFIED TYPE: ICD-10-CM

## 2024-11-29 DIAGNOSIS — U07.1 COVID: Primary | ICD-10-CM

## 2024-11-29 LAB
FLUAV AG UPPER RESP QL IA.RAPID: NEGATIVE
FLUBV AG UPPER RESP QL IA.RAPID: NEGATIVE
SARS-COV+SARS-COV-2 AG RESP QL IA.RAPID: DETECTED
TEST LOT EXPIRATION DATE: ABNORMAL
TEST LOT NUMBER: ABNORMAL

## 2024-11-29 RX ORDER — BUTALBITAL, ACETAMINOPHEN AND CAFFEINE 50; 325; 40 MG/1; MG/1; MG/1
1 TABLET ORAL EVERY 6 HOURS PRN
Qty: 30 TABLET | Refills: 0 | Status: SHIPPED | OUTPATIENT
Start: 2024-11-29 | End: 2025-01-16 | Stop reason: SDUPTHER

## 2024-11-29 RX ORDER — ALBUTEROL SULFATE 90 UG/1
2 INHALANT RESPIRATORY (INHALATION) EVERY 4 HOURS PRN
Qty: 1 EACH | Refills: 1 | Status: SHIPPED | OUTPATIENT
Start: 2024-11-29

## 2024-11-29 ASSESSMENT — ENCOUNTER SYMPTOMS
SINUS PRESSURE: 0
CHILLS: 1
SINUS PAIN: 0
ABDOMINAL PAIN: 0
HEADACHES: 1
FATIGUE: 1
CONSTIPATION: 0
WHEEZING: 0
NAUSEA: 0
SORE THROAT: 0
FEVER: 0
RHINORRHEA: 1
SWEATS: 0
SHORTNESS OF BREATH: 0
DIARRHEA: 1
COUGH: 1
VOMITING: 0

## 2024-11-29 ASSESSMENT — COPD QUESTIONNAIRES: COPD: 0

## 2024-12-03 ENCOUNTER — TELEPHONE (OUTPATIENT)
Dept: URGENT CARE | Age: 69
End: 2024-12-03

## 2024-12-23 ENCOUNTER — APPOINTMENT (OUTPATIENT)
Dept: INTERNAL MEDICINE | Age: 69
End: 2024-12-23

## 2025-01-03 ENCOUNTER — E-ADVICE (OUTPATIENT)
Dept: GASTROENTEROLOGY | Age: 70
End: 2025-01-03

## 2025-01-03 ENCOUNTER — APPOINTMENT (OUTPATIENT)
Dept: CARDIOLOGY | Age: 70
End: 2025-01-03

## 2025-01-06 ENCOUNTER — TELEPHONE (OUTPATIENT)
Dept: GASTROENTEROLOGY | Age: 70
End: 2025-01-06

## 2025-01-06 DIAGNOSIS — K25.9 GASTRIC ULCER, UNSPECIFIED CHRONICITY, UNSPECIFIED WHETHER GASTRIC ULCER HEMORRHAGE OR PERFORATION PRESENT: Primary | ICD-10-CM

## 2025-01-16 DIAGNOSIS — G43.809 OTHER MIGRAINE WITHOUT STATUS MIGRAINOSUS, NOT INTRACTABLE: ICD-10-CM

## 2025-01-16 RX ORDER — BUTALBITAL, ACETAMINOPHEN AND CAFFEINE 50; 325; 40 MG/1; MG/1; MG/1
1 TABLET ORAL EVERY 6 HOURS PRN
Qty: 30 TABLET | Refills: 0 | Status: SHIPPED | OUTPATIENT
Start: 2025-01-16

## 2025-01-17 ENCOUNTER — TELEPHONE (OUTPATIENT)
Dept: INTERNAL MEDICINE | Age: 70
End: 2025-01-17

## 2025-02-06 ENCOUNTER — APPOINTMENT (OUTPATIENT)
Dept: INTERNAL MEDICINE | Age: 70
End: 2025-02-06

## 2025-02-10 SDOH — ECONOMIC STABILITY: HOUSING INSECURITY: DO YOU HAVE PROBLEMS WITH ANY OF THE FOLLOWING?: NONE OF THE ABOVE

## 2025-02-10 SDOH — ECONOMIC STABILITY: FOOD INSECURITY: WITHIN THE PAST 12 MONTHS, THE FOOD YOU BOUGHT JUST DIDN'T LAST AND YOU DIDN'T HAVE MONEY TO GET MORE.: NEVER TRUE

## 2025-02-10 SDOH — ECONOMIC STABILITY: GENERAL: WOULD YOU LIKE HELP WITH ANY OF THE FOLLOWING NEEDS?: I DON'T WANT HELP WITH ANY OF THESE

## 2025-02-10 SDOH — ECONOMIC STABILITY: TRANSPORTATION INSECURITY
IN THE PAST 12 MONTHS, HAS LACK OF RELIABLE TRANSPORTATION KEPT YOU FROM MEDICAL APPOINTMENTS, MEETINGS, WORK OR FROM GETTING THINGS NEEDED FOR DAILY LIVING?: NO

## 2025-02-10 ASSESSMENT — SOCIAL DETERMINANTS OF HEALTH (SDOH): IN THE PAST 12 MONTHS, HAS THE ELECTRIC, GAS, OIL, OR WATER COMPANY THREATENED TO SHUT OFF SERVICE IN YOUR HOME?: NO

## 2025-02-11 ENCOUNTER — APPOINTMENT (OUTPATIENT)
Dept: INTERNAL MEDICINE | Age: 70
End: 2025-02-11

## 2025-02-11 VITALS
WEIGHT: 112 LBS | HEART RATE: 71 BPM | HEIGHT: 66 IN | SYSTOLIC BLOOD PRESSURE: 152 MMHG | BODY MASS INDEX: 18 KG/M2 | DIASTOLIC BLOOD PRESSURE: 90 MMHG

## 2025-02-11 DIAGNOSIS — E78.2 MIXED HYPERLIPIDEMIA: ICD-10-CM

## 2025-02-11 DIAGNOSIS — Z12.31 ENCOUNTER FOR SCREENING MAMMOGRAM FOR MALIGNANT NEOPLASM OF BREAST: ICD-10-CM

## 2025-02-11 DIAGNOSIS — N18.31 STAGE 3A CHRONIC KIDNEY DISEASE  (CMD): ICD-10-CM

## 2025-02-11 DIAGNOSIS — I10 PRIMARY HYPERTENSION: Primary | ICD-10-CM

## 2025-02-11 DIAGNOSIS — G43.809 OTHER MIGRAINE WITHOUT STATUS MIGRAINOSUS, NOT INTRACTABLE: ICD-10-CM

## 2025-02-11 DIAGNOSIS — Z72.0 TOBACCO USE: ICD-10-CM

## 2025-02-11 DIAGNOSIS — R91.1 LUNG NODULE: ICD-10-CM

## 2025-02-11 DIAGNOSIS — Z95.828 HISTORY OF ENDOVASCULAR STENT GRAFT FOR ABDOMINAL AORTIC ANEURYSM (AAA): ICD-10-CM

## 2025-02-11 DIAGNOSIS — M51.370 DEGENERATION OF INTERVERTEBRAL DISC OF LUMBOSACRAL REGION WITH DISCOGENIC BACK PAIN: ICD-10-CM

## 2025-02-11 PROBLEM — N18.32 STAGE 3B CHRONIC KIDNEY DISEASE  (CMD): Status: RESOLVED | Noted: 2018-12-28 | Resolved: 2025-02-11

## 2025-02-11 PROBLEM — Z01.810 PREOP CARDIOVASCULAR EXAM: Status: RESOLVED | Noted: 2024-09-13 | Resolved: 2025-02-11

## 2025-02-11 PROCEDURE — 3077F SYST BP >= 140 MM HG: CPT | Performed by: FAMILY MEDICINE

## 2025-02-11 PROCEDURE — 99214 OFFICE O/P EST MOD 30 MIN: CPT | Performed by: FAMILY MEDICINE

## 2025-03-09 PROBLEM — M23.92 LOCKING OF LEFT KNEE: Status: ACTIVE | Noted: 2024-10-01

## 2025-03-09 ASSESSMENT — ENCOUNTER SYMPTOMS
ENDOCRINE NEGATIVE: 1
ALLERGIC/IMMUNOLOGIC NEGATIVE: 1
GASTROINTESTINAL NEGATIVE: 1
CONSTITUTIONAL NEGATIVE: 1
EYES NEGATIVE: 1
RESPIRATORY NEGATIVE: 1
PSYCHIATRIC NEGATIVE: 1
HEMATOLOGIC/LYMPHATIC NEGATIVE: 1

## 2025-03-12 ENCOUNTER — E-ADVICE (OUTPATIENT)
Dept: GASTROENTEROLOGY | Age: 70
End: 2025-03-12

## 2025-03-13 ENCOUNTER — APPOINTMENT (OUTPATIENT)
Dept: CARDIOLOGY | Age: 70
End: 2025-03-13

## 2025-03-13 VITALS
RESPIRATION RATE: 18 BRPM | HEIGHT: 66 IN | WEIGHT: 109.8 LBS | DIASTOLIC BLOOD PRESSURE: 70 MMHG | BODY MASS INDEX: 17.64 KG/M2 | HEART RATE: 69 BPM | SYSTOLIC BLOOD PRESSURE: 126 MMHG | OXYGEN SATURATION: 97 %

## 2025-03-13 DIAGNOSIS — Z95.828 HISTORY OF ENDOVASCULAR STENT GRAFT FOR ABDOMINAL AORTIC ANEURYSM (AAA): ICD-10-CM

## 2025-03-13 DIAGNOSIS — I10 PRIMARY HYPERTENSION: Primary | ICD-10-CM

## 2025-03-13 DIAGNOSIS — Z72.0 TOBACCO USE: ICD-10-CM

## 2025-03-13 DIAGNOSIS — E78.2 MIXED HYPERLIPIDEMIA: ICD-10-CM

## 2025-03-13 DIAGNOSIS — G45.9 TIA (TRANSIENT ISCHEMIC ATTACK): ICD-10-CM

## 2025-03-13 PROCEDURE — 3078F DIAST BP <80 MM HG: CPT | Performed by: INTERNAL MEDICINE

## 2025-03-13 PROCEDURE — 99214 OFFICE O/P EST MOD 30 MIN: CPT | Performed by: INTERNAL MEDICINE

## 2025-03-13 PROCEDURE — 3074F SYST BP LT 130 MM HG: CPT | Performed by: INTERNAL MEDICINE

## 2025-03-13 PROCEDURE — 93000 ELECTROCARDIOGRAM COMPLETE: CPT | Performed by: INTERNAL MEDICINE

## 2025-03-17 ENCOUNTER — APPOINTMENT (OUTPATIENT)
Dept: GASTROENTEROLOGY | Age: 70
End: 2025-03-17
Attending: INTERNAL MEDICINE

## 2025-03-20 ENCOUNTER — APPOINTMENT (OUTPATIENT)
Dept: GASTROENTEROLOGY | Age: 70
End: 2025-03-20
Attending: INTERNAL MEDICINE

## 2025-03-25 ENCOUNTER — TELEPHONE (OUTPATIENT)
Dept: INTERNAL MEDICINE | Age: 70
End: 2025-03-25

## 2025-04-21 ENCOUNTER — APPOINTMENT (OUTPATIENT)
Dept: MAMMOGRAPHY | Age: 70
End: 2025-04-21
Attending: FAMILY MEDICINE

## 2025-05-08 ENCOUNTER — E-ADVICE (OUTPATIENT)
Dept: GASTROENTEROLOGY | Age: 70
End: 2025-05-08

## 2025-05-14 ENCOUNTER — ANESTHESIA EVENT (OUTPATIENT)
Dept: GASTROENTEROLOGY | Age: 70
End: 2025-05-14

## 2025-05-15 ENCOUNTER — ANESTHESIA (OUTPATIENT)
Dept: GASTROENTEROLOGY | Age: 70
End: 2025-05-15

## 2025-05-15 ENCOUNTER — APPOINTMENT (OUTPATIENT)
Dept: GASTROENTEROLOGY | Age: 70
End: 2025-05-15
Attending: INTERNAL MEDICINE

## 2025-05-15 VITALS
SYSTOLIC BLOOD PRESSURE: 131 MMHG | RESPIRATION RATE: 18 BRPM | DIASTOLIC BLOOD PRESSURE: 74 MMHG | OXYGEN SATURATION: 98 % | HEART RATE: 68 BPM | TEMPERATURE: 97 F

## 2025-05-15 DIAGNOSIS — K29.70 GASTRITIS DETERMINED BY ENDOSCOPY: ICD-10-CM

## 2025-05-15 DIAGNOSIS — K25.9 GASTRIC ULCER, UNSPECIFIED CHRONICITY, UNSPECIFIED WHETHER GASTRIC ULCER HEMORRHAGE OR PERFORATION PRESENT: ICD-10-CM

## 2025-05-15 RX ORDER — NICOTINE POLACRILEX 4 MG
30 LOZENGE BUCCAL
Status: DISCONTINUED | OUTPATIENT
Start: 2025-05-15 | End: 2025-05-17 | Stop reason: HOSPADM

## 2025-05-15 RX ORDER — LIDOCAINE HYDROCHLORIDE 10 MG/ML
5 INJECTION, SOLUTION INFILTRATION; PERINEURAL PRN
Status: DISCONTINUED | OUTPATIENT
Start: 2025-05-15 | End: 2025-05-17 | Stop reason: HOSPADM

## 2025-05-15 RX ORDER — 0.9 % SODIUM CHLORIDE 0.9 %
10 VIAL (ML) INJECTION PRN
Status: DISCONTINUED | OUTPATIENT
Start: 2025-05-15 | End: 2025-05-17 | Stop reason: HOSPADM

## 2025-05-15 RX ORDER — SODIUM CHLORIDE, SODIUM LACTATE, POTASSIUM CHLORIDE, CALCIUM CHLORIDE 600; 310; 30; 20 MG/100ML; MG/100ML; MG/100ML; MG/100ML
INJECTION, SOLUTION INTRAVENOUS CONTINUOUS
Status: DISCONTINUED | OUTPATIENT
Start: 2025-05-15 | End: 2025-05-17 | Stop reason: HOSPADM

## 2025-05-15 RX ORDER — DEXTROSE MONOHYDRATE 50 MG/ML
INJECTION, SOLUTION INTRAVENOUS CONTINUOUS PRN
Status: DISCONTINUED | OUTPATIENT
Start: 2025-05-15 | End: 2025-05-17 | Stop reason: HOSPADM

## 2025-05-15 RX ORDER — LIDOCAINE HYDROCHLORIDE 10 MG/ML
INJECTION, SOLUTION INFILTRATION; PERINEURAL PRN
Status: DISCONTINUED | OUTPATIENT
Start: 2025-05-15 | End: 2025-05-15

## 2025-05-15 RX ORDER — 0.9 % SODIUM CHLORIDE 0.9 %
2 VIAL (ML) INJECTION EVERY 12 HOURS SCHEDULED
Status: DISCONTINUED | OUTPATIENT
Start: 2025-05-15 | End: 2025-05-17 | Stop reason: HOSPADM

## 2025-05-15 RX ORDER — PROPOFOL 10 MG/ML
INJECTION, EMULSION INTRAVENOUS PRN
Status: DISCONTINUED | OUTPATIENT
Start: 2025-05-15 | End: 2025-05-15

## 2025-05-15 RX ORDER — SODIUM CHLORIDE, SODIUM LACTATE, POTASSIUM CHLORIDE, CALCIUM CHLORIDE 600; 310; 30; 20 MG/100ML; MG/100ML; MG/100ML; MG/100ML
INJECTION, SOLUTION INTRAVENOUS CONTINUOUS PRN
Status: DISCONTINUED | OUTPATIENT
Start: 2025-05-15 | End: 2025-05-15

## 2025-05-15 RX ORDER — SODIUM CHLORIDE 9 MG/ML
INJECTION, SOLUTION INTRAVENOUS CONTINUOUS
Status: DISCONTINUED | OUTPATIENT
Start: 2025-05-15 | End: 2025-05-17 | Stop reason: HOSPADM

## 2025-05-15 RX ORDER — DEXTROSE MONOHYDRATE 25 G/50ML
25 INJECTION, SOLUTION INTRAVENOUS PRN
Status: DISCONTINUED | OUTPATIENT
Start: 2025-05-15 | End: 2025-05-17 | Stop reason: HOSPADM

## 2025-05-15 RX ADMIN — LIDOCAINE HYDROCHLORIDE 50 MG: 10 INJECTION, SOLUTION INFILTRATION; PERINEURAL at 11:23

## 2025-05-15 RX ADMIN — SODIUM CHLORIDE, SODIUM LACTATE, POTASSIUM CHLORIDE, CALCIUM CHLORIDE: 600; 310; 30; 20 INJECTION, SOLUTION INTRAVENOUS at 10:43

## 2025-05-15 RX ADMIN — SODIUM CHLORIDE, SODIUM LACTATE, POTASSIUM CHLORIDE, CALCIUM CHLORIDE: 600; 310; 30; 20 INJECTION, SOLUTION INTRAVENOUS at 11:20

## 2025-05-15 RX ADMIN — PROPOFOL 100 MG: 10 INJECTION, EMULSION INTRAVENOUS at 11:23

## 2025-05-15 SDOH — SOCIAL STABILITY: SOCIAL INSECURITY: RISK FACTORS: AGE

## 2025-05-15 ASSESSMENT — ENCOUNTER SYMPTOMS
EXERCISE TOLERANCE: GOOD (>4 METS)
HEADACHES: 1

## 2025-05-15 ASSESSMENT — PAIN SCALES - GENERAL
PAINLEVEL_OUTOF10: 0
PAINLEVEL_OUTOF10: 0

## 2025-05-16 ENCOUNTER — TELEPHONE (OUTPATIENT)
Dept: GASTROENTEROLOGY | Age: 70
End: 2025-05-16

## 2025-05-16 DIAGNOSIS — R19.7 ACUTE DIARRHEA: Primary | ICD-10-CM

## 2025-05-19 ENCOUNTER — LAB SERVICES (OUTPATIENT)
Dept: LAB | Age: 70
End: 2025-05-19

## 2025-05-19 ENCOUNTER — RESULTS FOLLOW-UP (OUTPATIENT)
Dept: GASTROENTEROLOGY | Age: 70
End: 2025-05-19

## 2025-05-19 LAB
ASR DISCLAIMER: NORMAL
CASE RPRT: NORMAL
CLINICAL INFO: NORMAL
PATH REPORT.FINAL DX SPEC: NORMAL
PATH REPORT.GROSS SPEC: NORMAL

## 2025-05-20 ENCOUNTER — LAB SERVICES (OUTPATIENT)
Dept: LAB | Age: 70
End: 2025-05-20

## 2025-05-20 DIAGNOSIS — R19.7 ACUTE DIARRHEA: ICD-10-CM

## 2025-05-20 LAB — C DIFF TOX B TCDB STL QL NAA+PROBE: NOT DETECTED

## 2025-05-20 PROCEDURE — 87046 STOOL CULTR AEROBIC BACT EA: CPT | Performed by: CLINICAL MEDICAL LABORATORY

## 2025-05-20 PROCEDURE — 87077 CULTURE AEROBIC IDENTIFY: CPT | Performed by: CLINICAL MEDICAL LABORATORY

## 2025-05-20 PROCEDURE — 87427 SHIGA-LIKE TOXIN AG IA: CPT | Performed by: CLINICAL MEDICAL LABORATORY

## 2025-05-20 PROCEDURE — 87449 NOS EACH ORGANISM AG IA: CPT | Performed by: CLINICAL MEDICAL LABORATORY

## 2025-05-20 PROCEDURE — 87493 C DIFF AMPLIFIED PROBE: CPT | Performed by: INTERNAL MEDICINE

## 2025-05-20 PROCEDURE — 87045 FECES CULTURE AEROBIC BACT: CPT | Performed by: CLINICAL MEDICAL LABORATORY

## 2025-05-20 RX ORDER — ESOMEPRAZOLE MAGNESIUM 40 MG/1
40 CAPSULE, DELAYED RELEASE ORAL
Qty: 60 CAPSULE | Refills: 5 | Status: SHIPPED | OUTPATIENT
Start: 2025-05-20

## 2025-05-21 ENCOUNTER — TELEPHONE (OUTPATIENT)
Dept: GASTROENTEROLOGY | Age: 70
End: 2025-05-21

## 2025-05-21 ENCOUNTER — TELEPHONE (OUTPATIENT)
Age: 70
End: 2025-05-21

## 2025-05-21 ENCOUNTER — E-ADVICE (OUTPATIENT)
Dept: CARDIOLOGY | Age: 70
End: 2025-05-21

## 2025-05-21 DIAGNOSIS — Z98.890 S/P AAA REPAIR: Primary | ICD-10-CM

## 2025-05-21 DIAGNOSIS — Z95.828 HISTORY OF ENDOVASCULAR STENT GRAFT FOR ABDOMINAL AORTIC ANEURYSM (AAA): ICD-10-CM

## 2025-05-21 DIAGNOSIS — Z86.79 S/P AAA REPAIR: Primary | ICD-10-CM

## 2025-05-21 LAB — C JEJUNI+C COLI AG STL QL: NORMAL

## 2025-05-22 LAB
BACTERIA STL CULT: NORMAL
E COLI SHIGA-LIKE TOXIN 1+2 STL QL IA: NORMAL

## 2025-05-23 ENCOUNTER — RESULTS FOLLOW-UP (OUTPATIENT)
Dept: GASTROENTEROLOGY | Age: 70
End: 2025-05-23

## 2025-05-23 ENCOUNTER — APPOINTMENT (OUTPATIENT)
Dept: INTERNAL MEDICINE | Age: 70
End: 2025-05-23

## 2025-05-27 ENCOUNTER — TELEPHONE (OUTPATIENT)
Dept: INTERNAL MEDICINE | Age: 70
End: 2025-05-27

## 2025-06-04 ENCOUNTER — APPOINTMENT (OUTPATIENT)
Dept: MAMMOGRAPHY | Age: 70
End: 2025-06-04
Attending: FAMILY MEDICINE

## 2025-06-04 DIAGNOSIS — Z12.31 ENCOUNTER FOR SCREENING MAMMOGRAM FOR MALIGNANT NEOPLASM OF BREAST: ICD-10-CM

## 2025-06-04 PROCEDURE — 77067 SCR MAMMO BI INCL CAD: CPT | Performed by: RADIOLOGY

## 2025-06-04 PROCEDURE — 77063 BREAST TOMOSYNTHESIS BI: CPT | Performed by: RADIOLOGY

## 2025-06-05 ENCOUNTER — RESULTS FOLLOW-UP (OUTPATIENT)
Dept: FAMILY MEDICINE | Age: 70
End: 2025-06-05

## 2025-06-05 ENCOUNTER — RESULTS FOLLOW-UP (OUTPATIENT)
Dept: CARDIOLOGY | Age: 70
End: 2025-06-05

## 2025-06-05 ENCOUNTER — TELEPHONE (OUTPATIENT)
Dept: INTERNAL MEDICINE | Age: 70
End: 2025-06-05

## 2025-06-05 ENCOUNTER — LAB SERVICES (OUTPATIENT)
Dept: LAB | Age: 70
End: 2025-06-05

## 2025-06-05 DIAGNOSIS — Z86.79 S/P AAA REPAIR: ICD-10-CM

## 2025-06-05 DIAGNOSIS — E78.2 MIXED HYPERLIPIDEMIA: ICD-10-CM

## 2025-06-05 DIAGNOSIS — Z98.890 S/P AAA REPAIR: ICD-10-CM

## 2025-06-05 DIAGNOSIS — G43.809 OTHER MIGRAINE WITHOUT STATUS MIGRAINOSUS, NOT INTRACTABLE: ICD-10-CM

## 2025-06-05 DIAGNOSIS — I10 PRIMARY HYPERTENSION: ICD-10-CM

## 2025-06-05 DIAGNOSIS — Z95.828 HISTORY OF ENDOVASCULAR STENT GRAFT FOR ABDOMINAL AORTIC ANEURYSM (AAA): ICD-10-CM

## 2025-06-05 LAB
CREAT SERPL-MCNC: 1.05 MG/DL (ref 0.51–0.95)
EGFRCR SERPLBLD CKD-EPI 2021: 57 ML/MIN/{1.73_M2}

## 2025-06-05 PROCEDURE — 36415 COLL VENOUS BLD VENIPUNCTURE: CPT | Performed by: INTERNAL MEDICINE

## 2025-06-05 PROCEDURE — 82565 ASSAY OF CREATININE: CPT | Performed by: INTERNAL MEDICINE

## 2025-06-10 ENCOUNTER — RESULTS FOLLOW-UP (OUTPATIENT)
Dept: CARDIOLOGY | Age: 70
End: 2025-06-10

## 2025-06-10 ENCOUNTER — HOSPITAL ENCOUNTER (OUTPATIENT)
Age: 70
Discharge: HOME | End: 2025-06-10
Attending: INTERNAL MEDICINE

## 2025-06-10 DIAGNOSIS — G45.9 TIA (TRANSIENT ISCHEMIC ATTACK): ICD-10-CM

## 2025-06-10 DIAGNOSIS — Z01.810 PREOP CARDIOVASCULAR EXAM: ICD-10-CM

## 2025-06-10 DIAGNOSIS — N18.32 STAGE 3B CHRONIC KIDNEY DISEASE  (CMD): ICD-10-CM

## 2025-06-10 DIAGNOSIS — Z95.828 HISTORY OF ENDOVASCULAR STENT GRAFT FOR ABDOMINAL AORTIC ANEURYSM (AAA): ICD-10-CM

## 2025-06-10 DIAGNOSIS — Z95.828 HISTORY OF ENDOVASCULAR STENT GRAFT FOR ABDOMINAL AORTIC ANEURYSM (AAA): Primary | ICD-10-CM

## 2025-06-10 DIAGNOSIS — E78.2 MIXED HYPERLIPIDEMIA: ICD-10-CM

## 2025-06-10 DIAGNOSIS — I10 PRIMARY HYPERTENSION: ICD-10-CM

## 2025-06-10 PROCEDURE — 74174 CTA ABD&PLVS W/CONTRAST: CPT

## 2025-06-10 PROCEDURE — 10002805 HB CONTRAST AGENT: Performed by: INTERNAL MEDICINE

## 2025-06-10 RX ADMIN — IOHEXOL 80 ML: 350 INJECTION, SOLUTION INTRAVENOUS at 10:47

## 2025-06-17 ENCOUNTER — E-ADVICE (OUTPATIENT)
Dept: GASTROENTEROLOGY | Age: 70
End: 2025-06-17

## 2025-06-25 DIAGNOSIS — I25.119 CORONARY ARTERY DISEASE INVOLVING NATIVE CORONARY ARTERY OF NATIVE HEART WITH ANGINA PECTORIS (CMD): ICD-10-CM

## 2025-06-25 DIAGNOSIS — E78.2 MIXED HYPERLIPIDEMIA: ICD-10-CM

## 2025-06-25 DIAGNOSIS — I10 ESSENTIAL HYPERTENSION: ICD-10-CM

## 2025-06-25 DIAGNOSIS — I71.40 ABDOMINAL AORTIC ANEURYSM WITHOUT RUPTURE (CMD): ICD-10-CM

## 2025-06-25 RX ORDER — METOPROLOL TARTRATE 25 MG/1
TABLET, FILM COATED ORAL
Qty: 270 TABLET | Refills: 3 | Status: SHIPPED | OUTPATIENT
Start: 2025-06-25

## 2025-06-26 RX ORDER — ATORVASTATIN CALCIUM 80 MG/1
80 TABLET, FILM COATED ORAL DAILY
Qty: 90 TABLET | Refills: 3 | OUTPATIENT
Start: 2025-06-26

## 2025-07-10 ENCOUNTER — LAB SERVICES (OUTPATIENT)
Dept: LAB | Age: 70
End: 2025-07-10

## 2025-07-10 DIAGNOSIS — D64.9 ANEMIA, UNSPECIFIED TYPE: ICD-10-CM

## 2025-07-10 DIAGNOSIS — I10 PRIMARY HYPERTENSION: ICD-10-CM

## 2025-07-10 DIAGNOSIS — G43.809 OTHER MIGRAINE WITHOUT STATUS MIGRAINOSUS, NOT INTRACTABLE: ICD-10-CM

## 2025-07-10 DIAGNOSIS — E78.2 MIXED HYPERLIPIDEMIA: ICD-10-CM

## 2025-07-10 LAB
ALBUMIN SERPL-MCNC: 3.9 G/DL (ref 3.4–5)
ALBUMIN/GLOB SERPL: 1.4 {RATIO} (ref 1–2.4)
ALP SERPL-CCNC: 99 UNITS/L (ref 45–117)
ALT SERPL-CCNC: 23 UNITS/L
ANION GAP SERPL CALC-SCNC: 17 MMOL/L (ref 7–19)
AST SERPL-CCNC: 23 UNITS/L
BASOPHILS # BLD: 0.1 K/MCL (ref 0–0.3)
BASOPHILS NFR BLD: 1 %
BILIRUB SERPL-MCNC: 0.3 MG/DL (ref 0.2–1)
BUN SERPL-MCNC: 16 MG/DL (ref 6–20)
BUN/CREAT SERPL: 15 (ref 7–25)
CALCIUM SERPL-MCNC: 9.2 MG/DL (ref 8.4–10.2)
CHLORIDE SERPL-SCNC: 109 MMOL/L (ref 97–110)
CHOLEST SERPL-MCNC: 139 MG/DL
CHOLEST/HDLC SERPL: 2.5 {RATIO}
CO2 SERPL-SCNC: 22 MMOL/L (ref 21–32)
CREAT SERPL-MCNC: 1.09 MG/DL (ref 0.51–0.95)
DEPRECATED RDW RBC: 57.4 FL (ref 39–50)
EGFRCR SERPLBLD CKD-EPI 2021: 54 ML/MIN/{1.73_M2}
EOSINOPHIL # BLD: 0.2 K/MCL (ref 0–0.5)
EOSINOPHIL NFR BLD: 3 %
ERYTHROCYTE [DISTWIDTH] IN BLOOD: 14.6 % (ref 11–15)
FASTING DURATION TIME PATIENT: ABNORMAL H
GLOBULIN SER-MCNC: 2.7 G/DL (ref 2–4)
GLUCOSE SERPL-MCNC: 76 MG/DL (ref 70–99)
HCT VFR BLD CALC: 37.9 % (ref 36–46.5)
HDLC SERPL-MCNC: 55 MG/DL
HGB BLD-MCNC: 11.5 G/DL (ref 12–15.5)
IMM GRANULOCYTES # BLD AUTO: 0 K/MCL (ref 0–0.2)
IMM GRANULOCYTES # BLD: 0 %
LDLC SERPL CALC-MCNC: 64 MG/DL
LYMPHOCYTES # BLD: 2 K/MCL (ref 1–4)
LYMPHOCYTES NFR BLD: 22 %
MCH RBC QN AUTO: 32 PG (ref 26–34)
MCHC RBC AUTO-ENTMCNC: 30.3 G/DL (ref 32–36.5)
MCV RBC AUTO: 105.6 FL (ref 78–100)
MONOCYTES # BLD: 0.7 K/MCL (ref 0.3–0.9)
MONOCYTES NFR BLD: 8 %
NEUTROPHILS # BLD: 6 K/MCL (ref 1.8–7.7)
NEUTROPHILS NFR BLD: 66 %
NONHDLC SERPL-MCNC: 84 MG/DL
NRBC BLD MANUAL-RTO: 0 /100 WBC
PLATELET # BLD AUTO: 333 K/MCL (ref 140–450)
POTASSIUM SERPL-SCNC: 4.8 MMOL/L (ref 3.4–5.1)
PROT SERPL-MCNC: 6.6 G/DL (ref 6.4–8.2)
RBC # BLD: 3.59 MIL/MCL (ref 4–5.2)
SODIUM SERPL-SCNC: 143 MMOL/L (ref 135–145)
TRIGL SERPL-MCNC: 100 MG/DL
TSH SERPL-ACNC: 0.62 MCUNITS/ML (ref 0.35–5)
WBC # BLD: 9 K/MCL (ref 4.2–11)

## 2025-07-10 PROCEDURE — 82728 ASSAY OF FERRITIN: CPT | Performed by: INTERNAL MEDICINE

## 2025-07-10 PROCEDURE — 83550 IRON BINDING TEST: CPT | Performed by: INTERNAL MEDICINE

## 2025-07-10 PROCEDURE — 83540 ASSAY OF IRON: CPT | Performed by: INTERNAL MEDICINE

## 2025-07-10 PROCEDURE — 36415 COLL VENOUS BLD VENIPUNCTURE: CPT | Performed by: FAMILY MEDICINE

## 2025-07-10 PROCEDURE — 80061 LIPID PANEL: CPT | Performed by: INTERNAL MEDICINE

## 2025-07-10 PROCEDURE — 82746 ASSAY OF FOLIC ACID SERUM: CPT | Performed by: CLINICAL MEDICAL LABORATORY

## 2025-07-10 PROCEDURE — 82607 VITAMIN B-12: CPT | Performed by: CLINICAL MEDICAL LABORATORY

## 2025-07-10 PROCEDURE — 80050 GENERAL HEALTH PANEL: CPT | Performed by: INTERNAL MEDICINE

## 2025-07-15 ENCOUNTER — APPOINTMENT (OUTPATIENT)
Dept: INTERNAL MEDICINE | Age: 70
End: 2025-07-15

## 2025-07-15 VITALS
HEIGHT: 66 IN | WEIGHT: 107 LBS | SYSTOLIC BLOOD PRESSURE: 138 MMHG | HEART RATE: 65 BPM | TEMPERATURE: 97.5 F | BODY MASS INDEX: 17.19 KG/M2 | DIASTOLIC BLOOD PRESSURE: 88 MMHG

## 2025-07-15 DIAGNOSIS — N18.31 STAGE 3A CHRONIC KIDNEY DISEASE  (CMD): ICD-10-CM

## 2025-07-15 DIAGNOSIS — G43.809 OTHER MIGRAINE WITHOUT STATUS MIGRAINOSUS, NOT INTRACTABLE: ICD-10-CM

## 2025-07-15 DIAGNOSIS — Z95.828 HISTORY OF ENDOVASCULAR STENT GRAFT FOR ABDOMINAL AORTIC ANEURYSM (AAA): ICD-10-CM

## 2025-07-15 DIAGNOSIS — E78.2 MIXED HYPERLIPIDEMIA: ICD-10-CM

## 2025-07-15 DIAGNOSIS — D64.9 ANEMIA, UNSPECIFIED TYPE: ICD-10-CM

## 2025-07-15 DIAGNOSIS — Z87.11 HISTORY OF GASTRIC ULCER: ICD-10-CM

## 2025-07-15 DIAGNOSIS — I10 PRIMARY HYPERTENSION: ICD-10-CM

## 2025-07-15 DIAGNOSIS — Z00.00 PE (PHYSICAL EXAM), ROUTINE: Primary | ICD-10-CM

## 2025-07-15 DIAGNOSIS — Z72.0 TOBACCO USE: ICD-10-CM

## 2025-07-15 LAB
FERRITIN SERPL-MCNC: 22 NG/ML (ref 8–252)
FOLATE SERPL-MCNC: 2.5 NG/ML
IRON SATN MFR SERPL: 13 % (ref 15–45)
IRON SERPL-MCNC: 41 MCG/DL (ref 50–170)
TIBC SERPL-MCNC: 328 MCG/DL (ref 250–450)
VIT B12 SERPL-MCNC: 305 PG/ML (ref 211–911)

## 2025-07-15 RX ORDER — BUTALBITAL, ACETAMINOPHEN AND CAFFEINE 50; 325; 40 MG/1; MG/1; MG/1
1 TABLET ORAL EVERY 6 HOURS PRN
Qty: 30 TABLET | Refills: 0 | Status: SHIPPED | OUTPATIENT
Start: 2025-07-15 | End: 2025-07-15 | Stop reason: SDUPTHER

## 2025-07-15 RX ORDER — BUTALBITAL, ACETAMINOPHEN AND CAFFEINE 50; 325; 40 MG/1; MG/1; MG/1
1 TABLET ORAL EVERY 6 HOURS PRN
Qty: 30 TABLET | Refills: 0 | Status: SHIPPED | OUTPATIENT
Start: 2025-07-15

## 2025-07-15 ASSESSMENT — PATIENT HEALTH QUESTIONNAIRE - PHQ9
CLINICAL INTERPRETATION OF PHQ2 SCORE: NO FURTHER SCREENING NEEDED
1. LITTLE INTEREST OR PLEASURE IN DOING THINGS: NOT AT ALL
2. FEELING DOWN, DEPRESSED OR HOPELESS: NOT AT ALL
SUM OF ALL RESPONSES TO PHQ9 QUESTIONS 1 AND 2: 0
SUM OF ALL RESPONSES TO PHQ9 QUESTIONS 1 AND 2: 0

## 2025-07-16 RX ORDER — FERROUS SULFATE 325(65) MG
325 TABLET ORAL DAILY
Status: SHIPPED | COMMUNITY
Start: 2025-07-16

## 2025-09-25 ENCOUNTER — APPOINTMENT (OUTPATIENT)
Dept: CARDIOLOGY | Age: 70
End: 2025-09-25

## 2026-01-20 ENCOUNTER — APPOINTMENT (OUTPATIENT)
Dept: INTERNAL MEDICINE | Age: 71
End: 2026-01-20

## 2026-03-27 ENCOUNTER — APPOINTMENT (OUTPATIENT)
Dept: CARDIOLOGY | Age: 71
End: 2026-03-27

## (undated) DEVICE — 450 ML BOTTLE OF 0.05% CHLORHEXIDINE GLUCONATE IN 99.95% STERILE WATER FOR IRRIGATION, USP AND APPLICATOR.: Brand: IRRISEPT ANTIMICROBIAL WOUND LAVAGE

## (undated) DEVICE — SPONGE RAYTEC 4X4 RF DETECT

## (undated) DEVICE — SUTURE NABSB OTHCRD 2 OS-6

## (undated) DEVICE — LIGHT HANDLE

## (undated) DEVICE — PILLOW ABDUCTION HIP SM

## (undated) DEVICE — Device

## (undated) DEVICE — STERILE POLYISOPRENE POWDER-FREE SURGICAL GLOVES: Brand: PROTEXIS

## (undated) DEVICE — NEEDLE SPINAL 18X3-1/2 PINK.

## (undated) DEVICE — TOTAL HIP CDS: Brand: MEDLINE INDUSTRIES, INC.

## (undated) DEVICE — BLADE ELECTRODE: Brand: EDGE

## (undated) DEVICE — MLPD DISPOSABLE PAD (6' ROLL) 3 ROLLS: Brand: SCHAERER MEDICAL USA

## (undated) DEVICE — WRAP COOLING HIP W/ICE PILLOW

## (undated) DEVICE — HOOD: Brand: FLYTE

## (undated) DEVICE — SUTURE VICRYL 2-0 CT-1

## (undated) DEVICE — SYRINGE 30ML LL TIP

## (undated) DEVICE — SOL  .9 1000ML BAG

## (undated) DEVICE — GOWN,SIRUS,FABRIC-REINFORCED,X-LARGE: Brand: MEDLINE

## (undated) DEVICE — CHLORAPREP ORANGE TINT 10.5ML

## (undated) DEVICE — HOOD, PEEL-AWAY: Brand: FLYTE

## (undated) DEVICE — PIN STEINMAN SMOOTH 1/8 9

## (undated) DEVICE — DRAPE,U/SHT,SPLIT,FILM,60X84,STERILE: Brand: MEDLINE

## (undated) DEVICE — STERILE POLYISOPRENE POWDER-FREE SURGICAL GLOVES WITH EMOLLIENT COATING: Brand: PROTEXIS

## (undated) DEVICE — VIOLET BRAIDED (POLYGLACTIN 910), SYNTHETIC ABSORBABLE SUTURE: Brand: COATED VICRYL

## (undated) DEVICE — STERILE SYNTHETIC POLYISOPRENE POWDER-FREE SURGICAL GLOVES WITH HYDROGEL COATING, SMOOTH FINISH, STRAIGHT FINGER: Brand: PROTEXIS

## (undated) DEVICE — Device: Brand: STABLECUT®

## (undated) DEVICE — SCD SLEEVE KNEE HI BLEND